# Patient Record
Sex: MALE | Race: WHITE
[De-identification: names, ages, dates, MRNs, and addresses within clinical notes are randomized per-mention and may not be internally consistent; named-entity substitution may affect disease eponyms.]

---

## 2018-05-10 NOTE — EDM.PDOC
ED HPI GENERAL MEDICAL PROBLEM





- General


Chief Complaint: Neuro Symptoms/Deficits


Stated Complaint: [facility]


Time Seen by Provider: 05/10/18 15:45


Source of Information: Reports: Patient


History Limitations: Reports: No Limitations





- History of Present Illness


INITIAL COMMENTS - FREE TEXT/NARRATIVE: 





82-year-old male presents to the ED [facility].  

states for the last 5 days since awakening he is appreciated numbness from his 

left lower back primarily around T10-11 all the way down to his left leg. He 

shoots penis is numb and left hemiscrotal contents are normal. There is no 

burning quality to the pain. He still able to void and defecate normally. 

Patient has known cervical disc disease with retrolisthesis of C4 on C5 and C5 

on C6. He has however no numbness or tingling in either upper extremity. Known 

severe degenerative disc disease throughout his lumbar spine with previous 

laminectomy he believes at L5-S1. He got sciatica relief in his left leg for 

about 6 months. Done in 1973. And MRIs reveal severe arthropathy in his lower 

facet joints from L2-L-4. He has chronic sciatica pain in both legs 

intermittently from spinal stenosis. He felt the numbness and tingling would 

dissipate on its own but has not. It has persisted over the last 5 days. He has 

no change in his gait or level of pain in his leg. There are no rashes in the 

distribution of his paresthesias.


Onset: Sudden


Onset Date: 05/05/18 (Awoke with numbness and tingling in his left lower back 

which involved his buttock cheek left lower leg penis and scrotum i.e. 

distribution of L1-L2. Symptoms have persisted since that time)


Onset Time: 07:00


Duration: Day(s):


Location: Reports: Other (Left lower back left buttock left lower extremity 

left amisha-scrotum and penis.)


Quality: Reports: Other (Numbness and tingling mostly numbness no burning 

sensation)


Severity: Moderate


Improves with: Reports: None


Worsens with: Reports: None.  Denies: Movement


Context: Denies: Activity, Exercise, Lifting, Sick Contact, Trauma


Associated Symptoms: Reports: Cough (Chronic cough. Is bringing up a little bit 

more phlegm than usual. No associated fever or chills. Spoke pipe for about 5-6 

times daily).  Denies: No Other Symptoms, Confusion, Chest Pain, cough w sputum

, Diaphoresis, Fever/Chills, Headaches, Loss of Appetite, Malaise, Nausea/

Vomiting, Seizure, Shortness of Breath, Syncope, Weakness


Treatments PTA: Reports: Other (see below)


  ** Lower Back


Pain Score (Numeric/FACES): 7





- Related Data


 Allergies











Allergy/AdvReac Type Severity Reaction Status Date / Time


 


No Known Allergies Allergy   Verified 05/10/18 15:29











Home Meds: 


 Home Meds





Amlodipine.  05/10/18 [History]


Terbinafine [LamISIL AT] 30 gm .XX DAILY #1 tube 05/10/18 [Rx]











Past Medical History


Cardiovascular History: Reports: Hypertension


Musculoskeletal History: Reports: Back Pain, Chronic, Other (See Below) (

Bilateral sciatica. Left is worse than the right. Lives with chronic pain for 

the last 45 years)





- Past Surgical History


Musculoskeletal Surgical History: Reports: Other (See Below)


Other Musculoskeletal Surgeries/Procedures:: laminectomy





Social & Family History





- Recreational Drug Use


Recreational Drug Use: No





- Living Situation & Occupation


Living situation: Reports: Single


Occupation: Retired





ED ROS GENERAL





- Review of Systems


Review Of Systems: See Below


Constitutional: Denies: Fever, Chills, Malaise, Weakness, Fatigue, Decreased 

Appetite, Weight Loss


HEENT: Reports: No Symptoms


Respiratory: Reports: Cough, Sputum (Chronic cough).  Denies: Hemoptysis ( is 

likely clear.)


Cardiovascular: Reports: No Symptoms


Endocrine: Reports: Fatigue


GI/Abdominal: Reports: No Symptoms


: Reports: Frequency, Other (Known BPH)


Musculoskeletal: Reports: Back Pain (Chronic severe back pain with sciatica 

both lower extremities worse worse on the left as compared to the right. 

Suspect secondary to degenerative just disease as well as significant 

arthropathy of the facet joints with spinal stenosis.)


Skin: Reports: Other (Numbness and tingling in his skin from along the left 

costal margin left amisha-back buttock left amisha-scrotum and penis and aware of 

increased numbness left lower extremity.)


Neurological: Reports: Numbness, Paresthesia, Tingling (As described above.), 

Difficulty Walking (Chronic difficulty walking due to weakness in both lower 

extremities.), Other (Diagnosed with perineural peroneal nerve palsy on the 

right side).  Denies: Pre-Existing Deficit, Seizure


Psychiatric: Reports: No Symptoms


Hematologic/Lymphatic: Reports: No Symptoms


Immunologic: Reports: No Symptoms





ED EXAM, NEURO





- Physical Exam


Exam: See Below


Exam Limited By: No Limitations


General Appearance: Alert, WD/WN, No Apparent Distress


Respiratory/Chest: No Respiratory Distress, Decreased Breath Sounds (Mildly 

decreased breath sounds lower 20% of lungs posteriorly compel with mild COPD.), 

Rhonchi (Scattered rhonchi anterior upper lung fields are clear with coughing. 

O2 sats are 96% on room air).  No: Normal Breath Sounds, Chest Non-Tender


Cardiovascular: Normal Peripheral Pulses, Regular Rate, Rhythm, No Edema, No 

Murmur


GI/Abdominal: Normal Bowel Sounds, Soft, Non-Tender, No Organomegaly, No Mass, 

Pelvis Stable


 (Male) Exam: Other (Reports left hemiscrotal area is normal as is his penis. 

Voiding normally)


Neurological: Abnormal Gait (Walks with a wide-based gait. Avoids going 

downstairs due to risk of falling.), Abnormal Sensation (Decreased sensation 

left lower back below the costal margin and follows along the 12th rib or lower 

costal margin. Aware of this and his left hemiabdomen left scrotum and shaft of 

his penis. Also aware of numbness tingling distribution of his left lower leg.)

, Abnormal Light Touch.  No: Ataxia


Back Exam: Normal Inspection, Decreased Range of Motion.  No: CVA Tenderness (R)

, Muscle Spasm, Paraspinal Tenderness


Extremities: Normal Inspection, Normal Range of Motion, Non-Tender, No Pedal 

Edema


Psychiatric: Normal Affect, Normal Mood


Skin Exam: Warm, Dry, Intact, Normal Color, Rash (Patient has a rash on the 

dorsal aspect of his left foot that has been present for over 6 weeks. Certain 

out as about a thumb sized area of erythema and is spread to become almost 

circular. On examination there is circular ridge with minimal central clearing 

suggestive of tinea pedis. The area is approximately 5 cm in diameter. Suspect 

tinea pedis infection)





Course





- Vital Signs


Last Recorded V/S: 


 Last Vital Signs











Temp  36.9 C   05/10/18 15:30


 


Pulse  82   05/10/18 15:30


 


Resp      


 


BP  170/110 H  05/10/18 15:30


 


Pulse Ox  96   05/10/18 15:30














- Orders/Labs/Meds


Orders: 


 Active Orders 24 hr











 Category Date Time Status


 


 Lumbar Spine wo Cont [CT] Stat Exams  05/10/18 15:55 Taken


 


 FOLIC ACID [CHEM] Stat Lab  05/10/18 16:10 Received


 


 VITAMIN B12 [CHEM] Stat Lab  05/10/18 16:10 Received











Labs: 


 Laboratory Tests











  05/10/18 05/10/18 05/10/18 Range/Units





  16:10 16:10 16:10 


 


WBC  9.19 H    (4.23-9.07)  K/mm3


 


RBC  5.07    (4.63-6.08)  M/mm3


 


Hgb  16.1    (13.7-17.5)  gm/L


 


Hct  48.0    (40.1-51.0)  %


 


MCV  94.7 H    (79.0-92.2)  fl


 


MCH  31.8    (25.7-32.2)  pg


 


MCHC  33.5    (32.2-35.5)  g/dl


 


RDW Std Deviation  49.3 H    (35.1-43.9)  fL


 


Plt Count  166    (163-337)  K/mm3


 


MPV  10.3    (9.4-12.3)  fl


 


Neutrophils % (Manual)  65 H    (40-60)  %


 


Band Neutrophils %  0    (0-10)  %


 


Lymphocytes % (Manual)  27    (20-40)  %


 


Atypical Lymphs %  0    %


 


Monocytes % (Manual)  8    (2-10)  %


 


Eosinophils % (Manual)  0 L    (0.8-7.0)  %


 


Basophils % (Manual)  0 L    (0.2-1.2)  


 


Platelet Estimate  Adequate    


 


RBC Morph Comment  Normal    


 


ESR   8   (0-15)  mm/hr


 


Sodium    142  (136-145)  mEq/L


 


Potassium    4.2  (3.5-5.1)  mEq/L


 


Chloride    107  ()  mEq/L


 


Carbon Dioxide    25  (21-32)  mEq/L


 


Anion Gap    14.2  (5-15)  


 


BUN    18  (7-18)  mg/dL


 


Creatinine    1.2  (0.7-1.3)  mg/dL


 


Est Cr Clr Drug Dosing    53.64  mL/min


 


Estimated GFR (MDRD)    58  (>60)  mL/min


 


BUN/Creatinine Ratio    15.0  (14-18)  


 


Glucose    106  ()  mg/dL


 


Calcium    9.2  (8.5-10.1)  mg/dL


 


Total Bilirubin    0.6  (0.2-1.0)  mg/dL


 


AST    5 L  (15-37)  U/L


 


ALT    24  (16-63)  U/L


 


Alkaline Phosphatase    61  ()  U/L


 


C-Reactive Protein    0.5  (<1.0)  mg/dL


 


Total Protein    7.2  (6.4-8.2)  g/dl


 


Albumin    3.8  (3.4-5.0)  g/dl


 


Globulin    3.4  gm/dL


 


Albumin/Globulin Ratio    1.1  (1-2)  














- Radiology Interpretation


Free Text/Narrative:: 


82-year-old male presents to the ED with a 5 day history of numbness and 

tingling in the distribution of his left amisha-back below the kidney and I 

believe an L1 nerve during nerve root distribution. The numbness extends from 

left amisha-back involving his buttock left hemiabdomen left scrotum shaft of the 

penis groin left lower extremity. Patient has chronic degenerative disc disease 

in the distribution of his lumbar spine. He also has significant degenerative 

disease in his cervical spine but has no upper extremity paresthesias numbness 

tingling or weakness. He has bilateral mild foot drops from peroneal nerve 

palsies and previous surgery on the left side. He does appear to have decreased 

sensation in the L1-L2 nerve root distribution. The cause of this was unclear. 

CT of his lumbar spine will be performed. Routine labs to be collected although 

they are unlikely to yield anything positive since he's asymptomatic and the 

rest of his body.








- Re-Assessments/Exams


Free Text/Narrative Re-Assessment/Exam: 





05/10/18 16:52 CT of the lumbar spine demonstrates moderate degenerative 

changes at multiple levels with particular anterior hypertrophic bridging noted 

at L2-L3 no acute fractures are evident. Vacuum disc present at L4-L5. 

Posterior disc bulges present at this level with no significant central canal 

stenosis. As appear unremarkable. 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 There is diffuse atherosclerotic changes in 

the vasculature.





05/10/18 17:12 Labs reveal a normal white count at 9.19 with 65% neutrophils no 

bands cells. Hemoglobin is 16.1 with hematocrit of 48.0. Mild hemoconcentration 

suspect. MCV is minimally elevated at 94.7. Platelet count is 166,000. 

Chemistry is completely normal. Total protein is normal at 7.2 with albumin 

fraction of 3.8. Therefore no metabolic abnormalities were identified to be 

causing numbness and tingling in the distribution as described in his history 

of present illness. Also the CT of the back itself does not show any central 

cord compression. Patient will have to follow up with neurology if symptoms 

persist. At this time no emergent condition exists. Patient will therefore be 

discharged to home. I will advise him to seek neurology consultation if his 

symptoms persist. Recommended Lamisil cream to be applied to the dorsal aspect 

of his left foot for suspect tenia pedis infection. I did do a folic acid and 

vitamin B-12 level on him but these results may not be available until 

tomorrow. I will call him if the levels returned abnormally low.











Departure





- Departure


Time of Disposition: 17:28


Disposition: Home, Self-Care 01


Condition: Fair


Clinical Impression: 


 Back pain associated with peripheral numbness, Tinea pedis, left








- Discharge Information


Prescriptions: 


Terbinafine [LamISIL AT] 30 gm .XX DAILY #1 tube


Instructions:  Athlete's Foot, Easy-to-Read


Referrals: 


Gill De DO [Primary Care Provider] - 


Forms:  ED Department Discharge


Additional Instructions: 


Evaluation the emergency room today [facility]. 

Development  of numbness with decreased sensation in the distribution of the 

ureter left lower back but talk left amisha-scrotum and penis as well as left 

lower extremity. These symptoms have been present for the last 5 days. There is 

no associated pain with this. He lived with chronic pain due to degenerative 

disc disease and severe arthritis in your lower back with sciatica in both 

lower extremities. Left is worse on the right. ET scan of the lower thoracic 

spine and lumbar spine was carried out today. It does not reveal any changes as 

compared to the MRI that you showed me. It demonstrates moderate degenerative 

changes at multiple levels there is extra bone which is actually bridging 

between lumbar 2 and lumbar 3 vertebral. I.e. they're starting to grow 

together. No fractures or abnormalities are appreciated. There is a vacuum disc 

present at L4-L5. Posterior disc bulge is present at that level with no 

significant central canal stenosis noted. Of note the CT did demonstrate a very 

small aneurysm of the aorta below the kidneys. It is 3.3 cm. Normal diameter of 

the aorta is around 2.5 cm. This needs to be followed up with a ultrasound of 

the aorta on a yearly basis. Third problem identified was rash chronically on 

her dorsal left foot which appears to be fungus infection or tinea infection. 

Lab work turned out to be completely normal. I have ordered a [facility] Annalee and 

vitamin B12 level but these results will not be available until tomorrow. To be 

normal. Therefore at this time I have no formal diagnosis to account for your 

numbness and tingling in this distribution. I would suggest neurological 

consultation with a neurologist. I would suggest [facility] this [facility] 

the [facility] which would mean going down to [place]. If the symptoms happened to 

go away over the next 6 weeks and you can always cancel the appointment. 

Suggest purchasing some Lamisil cream which is over-the-counter. Apply this to 

your rash on her foot once daily at bedtime for the next 3 weeks. May need a 

little bit longer if the lesion has not completely cleared.





- My Orders


Last 24 Hours: 


My Active Orders





05/10/18 15:55


Lumbar Spine wo Cont [CT] Stat 





05/10/18 16:10


FOLIC ACID [CHEM] Stat 


VITAMIN B12 [CHEM] Stat 














- Assessment/Plan


Last 24 Hours: 


My Active Orders





05/10/18 15:55


Lumbar Spine wo Cont [CT] Stat 





05/10/18 16:10


FOLIC ACID [CHEM] Stat 


VITAMIN B12 [CHEM] Stat

## 2018-05-11 NOTE — CT
CT lumbar spine

 

Technique: Multiple axial sections were obtained from above the 

T11-T12 disc inferiorly to below the L5-S1 disc.  Reconstructed 

sagittal and coronal images were reviewed.

 

Comparison: No prior lumbar spine imaging is available.

 

Findings:

T11-T12: Posterior disc is maintained.  No central canal stenosis or 

neural foraminal stenosis is seen.

 

T12-L1: Posterior disc is preserved.  Mild degenerative apophyseal 

change is seen.  No central canal stenosis or neural foraminal 

stenosis is seen.

 

L1-L2: Posterior disc is preserved.  No central canal stenosis is 

seen.  Fairly severe degenerative apophyseal change is seen.  Right 

renal cyst is seen at this level measuring approximately 3.8 cm.  No 

neural foraminal stenosis is seen.

 

L2-L3: Slight circumferential disc bulge is seen with posterior disc 

maintaining a concave margin.  Moderately severe degenerative 

apophyseal change noted.  No central canal stenosis is seen.  Neural 

foramina are patent where the nerve roots exit.

 

L3-L4: Circumferential disc bulge is seen.  Posterior disc maintains a

 concave margin.  Fairly severe degenerative apophyseal changes noted.

  Left-sided neural foraminal stenosis is seen.  Right neural foramen 

is patent.  Very minimal central canal stenosis is noted.

 

L4-L5: Circumferential disc bulge seen with vacuum phenomena.  

Moderate degenerative apophyseal change is seen.  Mild to moderate 

central canal stenosis is noted.  Bilateral neural foraminal stenosis 

is seen.

 

L5-S1: Disc space is obliterated.  Posterior laminectomy is noted.  No

 central canal stenosis is seen.  No discrete neural foraminal 

stenosis is seen.

 

No fracture is identified.  No abnormal subluxation is seen.  

Scattered anterior endplate osteophytes are seen.

 

Mid to distal abdominal aortic aneurysm is partially seen which 

appears saccular in configuration with AP dimension of about 2.8 cm on

 the sagittal views.

 

Impression:

1.  Diffuse degenerative change as noted above with several levels of 

central canal stenosis and neural foraminal stenosis.

2.  Mild saccular aneurysm within the mid to distal aorta with AP 

dimension of 2.8 cm.

3.  No acute bony abnormality is identified.

 

Diagnostic code #3

 

I agree with preliminary report from [facility], finalized at 05/10/18, 5:50

 PM Central Time

## 2019-02-19 ENCOUNTER — HOSPITAL ENCOUNTER (OUTPATIENT)
Dept: HOSPITAL 41 - JD.SDS | Age: 84
Discharge: HOME | End: 2019-02-19
Attending: OPHTHALMOLOGY
Payer: OTHER GOVERNMENT

## 2019-02-19 DIAGNOSIS — J44.9: ICD-10-CM

## 2019-02-19 DIAGNOSIS — H25.811: Primary | ICD-10-CM

## 2019-02-19 DIAGNOSIS — H16.223: ICD-10-CM

## 2019-02-19 DIAGNOSIS — H16.103: ICD-10-CM

## 2019-02-19 DIAGNOSIS — Z98.42: ICD-10-CM

## 2019-02-19 DIAGNOSIS — Z79.899: ICD-10-CM

## 2019-02-19 DIAGNOSIS — F17.210: ICD-10-CM

## 2019-02-19 DIAGNOSIS — I10: ICD-10-CM

## 2019-02-19 DIAGNOSIS — H02.831: ICD-10-CM

## 2019-02-19 DIAGNOSIS — Z96.1: ICD-10-CM

## 2019-02-19 DIAGNOSIS — H02.834: ICD-10-CM

## 2019-02-19 PROCEDURE — 66984 XCAPSL CTRC RMVL W/O ECP: CPT

## 2019-02-19 PROCEDURE — C1780 LENS, INTRAOCULAR (NEW TECH): HCPCS

## 2019-02-19 RX ADMIN — POLYMYXIN B SULFATE AND TRIMETHOPRIM SULFATE SCH DROP: 10000; 1 SOLUTION/ DROPS OPHTHALMIC at 09:33

## 2019-02-19 RX ADMIN — POLYMYXIN B SULFATE AND TRIMETHOPRIM SULFATE SCH DROP: 10000; 1 SOLUTION/ DROPS OPHTHALMIC at 08:59

## 2019-02-19 RX ADMIN — POLYMYXIN B SULFATE AND TRIMETHOPRIM SULFATE SCH ML: 10000; 1 SOLUTION/ DROPS OPHTHALMIC at 10:27

## 2019-02-19 RX ADMIN — TETRACAINE HYDROCHLORIDE SCH DROP: 5 SOLUTION OPHTHALMIC at 09:58

## 2019-02-19 RX ADMIN — TETRACAINE HYDROCHLORIDE SCH ML: 5 SOLUTION OPHTHALMIC at 10:16

## 2019-02-19 NOTE — [id number]
Post Anesthesia Note





- EVALUATION WITHIN 48HRS OF ANESTHETIC


Vital Signs in Normal Range: Yes


Patient Participated in Evaluation: Yes


Respiratory Function Stable: Yes


Airway Patent: Yes


Cardiovascular Function Stable: Yes


Hydration Status Stable: Yes


Pain Control Satisfactory: Yes


Nausea and Vomiting Control Satisfactory: Yes


Mental Status Recovered: Yes


Pulse Rate: 65


SaO2: 96


Resp Rate: 15


Temperature: 36.2 C


Blood Pressure: 139/84

## 2019-02-19 NOTE — PCM.PREANE
Preanesthetic Assessment





- Procedure


Proposed Procedure: 





cataract right eye





- Anesthesia/Transfusion/Family Hx


Anesthesia History: Prior Anesthesia Without Reaction


Family History of Anesthesia Reaction: No


Transfusion History: No Prior Transfusion(s)





- Review of Systems


General: No Symptoms


Pulmonary: No Symptoms


Cardiovascular: No Symptoms


Gastrointestinal: No Symptoms


Neurological: No Symptoms


Other: Reports: None





- Physical Assessment


NPO Status Date: 02/18/19


NPO Status Time: 21:00


Pulse: 92


O2 Sat by Pulse Oximetry: 97


Respiratory Rate: 16


Blood Pressure: 162/100


Temperature: 97.2 F


Height: 6 ft 1 in


Weight: 88.451 kg


ASA Class: 3


Mental Status: Alert & Oriented x3


Airway Class: Mallampati = 1


Dentition: Reports: Broken Tooth/Teeth, Missing Tooth/Teeth


Thyro-Mental Finger Breadths: 3


Mouth Opening Finger Breadths: 3


ROM/Head Extension: Full


Cardiovascular: Regular Rate, Regular Rhythm





- Allergies


Allergies/Adverse Reactions: 


 Allergies











Allergy/AdvReac Type Severity Reaction Status Date / Time


 


No Known Allergies Allergy   Verified 02/18/19 14:37














- Blood


Blood Available: No





- Acknowledgements


Anesthesia Type Planned: MAC


Pt an Appropriate Candidate for the Planned Anesthesia: Yes


Alternatives and Risks of Anesthesia Discussed w Pt/Guardian: Yes


Pt/Guardian Understands and Agrees with Anesthesia Plan: Yes





PreAnesthesia Questionnaire


Cardiovascular History: Reports: Hypertension


Respiratory History: Reports: COPD


Gastrointestinal History: Reports: None


Musculoskeletal History: Reports: Back Pain, Chronic, Other (See Below) (

Bilateral sciatica. Left is worse than the right. Lives with chronic pain for 

the last 45 years)





- Past Surgical History


Musculoskeletal Surgical History: Reports: Other (See Below) (ankle surgery)


Other Musculoskeletal Surgeries/Procedures:: laminectomy





- SUBSTANCE USE


Smoking Status *Q: Current Every Day Smoker


Tobacco Use Within Last Twelve Months: Cigarettes


Second Hand Smoke Exposure: Yes


Days Per Week of Alcohol Use: 1


Number of Drinks Per Day: 1


Total Drinks Per Week: 1


Recreational Drug Use History: No





- HOME MEDS


Home Medications: 


 Home Meds





Ascorbic Acid [Vitamin C] 1,000 mg PO DAILY 11/28/18 [History]


Calcium Carbonate [Calcium] 500 mg PO DAILY 11/28/18 [History]


Cholecalciferol (Vitamin D3) [Vitamin D3] 1,000 unit PO DAILY 11/28/18 [History]


Vitamin B Complex 1 cap PO DAILY 11/28/18 [History]


amLODIPine Besylate [Amlodipine Besylate] 5 mg PO DAILY 11/28/18 [History]











- CURRENT (IN HOUSE) MEDS


Current Meds: 





 Current Medications





Brimonidine Tartrate (Alphagan 0.2% Ophth Soln)  0 ml EYERT ASDIRECTED CHE


   Stop: 02/19/19 18:00


Cefuroxime Sodium (Zinacef)  0 mg EYERT ASDIRECTED CHE


   Stop: 02/19/19 18:00


Lidocaine HCl (Xylocaine-Mpf 1%)  0 ml INJECT ASDIRECTED CHE


   Stop: 02/19/19 18:00


Phenylephrine HCl (Roni-Synephrine 2.5% Ophth Soln)  0 ml EYERT ASDIRECTED CHE


   Stop: 02/19/19 18:00


Pilocarpine HCl (Pilocar 4% Ophth Soln)  0 ml EYERT ASDIRECTED CHE


   Stop: 02/19/19 18:00


Polymyxin/Trimethoprim Sulfate (Polytrim Ophth Soln)  0 ml EYERT ASDIRECTED CHE


   Stop: 02/19/19 18:00


Tetracaine HCl (Tetracaine 0.5% Steri-Unit Sol)  0 ml EYERT ASDIRECTED CHE


   Stop: 02/19/19 18:00


Tropicamide (Mydriacyl 1% Ophth Soln)  0 ml EYERT ASDIRECTED CHE


   Stop: 02/19/19 18:00

## 2020-01-07 NOTE — EDM.PDOC
ED HPI GENERAL MEDICAL PROBLEM





- General


Chief Complaint: Genitourinary Problem


Stated Complaint: [facility]


Time Seen by Provider: 01/07/20 16:25





- History of Present Illness


INITIAL COMMENTS - FREE TEXT/NARRATIVE: 





84-year-old male presents to the emergency room after [facility] with [facility] infection.





Patient has not felt right for the last 24 hours.  This morning from about 1 AM 

to 6 AM he had shakes and chills.  Apparently he has a history of recurrent 

urinary tract infections and has had quite elevated PSA according to [facility] 

[facility].  Patient is unaware of any other symptoms he is not having 

any nausea no vomiting he is not aware of any fevers but admits to the shakes 

and chills this morning.  He has had no breathing difficulties or shortness of 

breath no cough.  He is not having any burning or frequency with urination.  

The patient is a retired chiropractor.  This morning he tried to urinate into 

the kitchen sink to see what was going on in his urine he did not see anything 

abnormal and there were not any dishes in the sink unfortunately while he was 

doing this he lost his balance and fell and has a skin tear on his hand








- Related Data


 Allergies











Allergy/AdvReac Type Severity Reaction Status Date / Time


 


No Known Allergies Allergy   Verified 02/18/19 14:37











Home Meds: 


 Home Meds





Ascorbic Acid [Vitamin C] 1,000 mg PO DAILY 11/28/18 [History]


Calcium Carbonate [Calcium] 500 mg PO DAILY 11/28/18 [History]


Cholecalciferol (Vitamin D3) [Vitamin D3] 1,000 unit PO DAILY 11/28/18 [History]


Vitamin B Complex 1 cap PO DAILY 11/28/18 [History]


amLODIPine Besylate [Amlodipine Besylate] 5 mg PO DAILY 11/28/18 [History]











Past Medical History


Cardiovascular History: Reports: Hypertension, Other (See Below)


Other Cardiovascular History: abdominal aortic aneurysm


Respiratory History: Reports: COPD


Gastrointestinal History: Reports: None


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


Musculoskeletal History: Reports: Back Pain, Chronic, Other (See Below)





- Past Surgical History


Musculoskeletal Surgical History: Reports: Other (See Below)


Other Musculoskeletal Surgeries/Procedures:: laminectomy





Social & Family History





- Tobacco Use


Smoking Status *Q: Current Every Day Smoker


Years of Tobacco use: 66


Packs/Tins Daily: 0.4





- Caffeine Use


Caffeine Use: Reports: Coffee





- Recreational Drug Use


Recreational Drug Use: No





- Living Situation & Occupation


Living situation: Reports: Single


Occupation: Retired





ED ROS GENERAL





- Review of Systems


Review Of Systems: See Below


Constitutional: Reports: Chills, Night Sweats.  Denies: Fever


HEENT: Reports: No Symptoms


Respiratory: Reports: No Symptoms


Cardiovascular: Reports: No Symptoms


Endocrine: Reports: No Symptoms


GI/Abdominal: Reports: No Symptoms


: Reports: No Symptoms


Musculoskeletal: Reports: No Symptoms


Skin: Reports: No Symptoms


Neurological: Reports: No Symptoms





ED EXAM, GENERAL





- Physical Exam


Exam: See Below


Exam Limited By: No Limitations


General Appearance: Alert, No Apparent Distress


Eye Exam: Bilateral Eye: Normal Inspection


Ears: Normal External Exam, Normal Canal, Hearing Grossly Normal, Normal TMs, 

Other (Cerumen noted in both canals)


Nose: Normal Inspection, Normal Mucosa, No Blood


Throat/Mouth: Normal Inspection, Normal Lips, Normal Gums, Normal Oropharynx, 

Normal Voice, No Airway Compromise


Head: Atraumatic, Normocephalic


Neck: Normal Inspection, Supple, Non-Tender, Full Range of Motion.  No: 

Lymphadenopathy (L), Lymphadenopathy (R)


Respiratory/Chest: No Respiratory Distress, Lungs Clear, Normal Breath Sounds


Cardiovascular: Regular Rate, Rhythm, No Edema, No Murmur


GI/Abdominal: Normal Bowel Sounds, Soft, Other (Suprapubic discomfort more to 

the left side than on the right the patient has had this for quite a long time 

however.)


Back Exam: Normal Inspection, CVA Tenderness (R).  No: CVA Tenderness (L)


Extremities: Normal Inspection, No Pedal Edema





Course





- Vital Signs


Last Recorded V/S: 


 Last Vital Signs











Temp  36.1 C   01/07/20 16:17


 


Pulse  80   01/07/20 16:17


 


Resp  20   01/07/20 16:17


 


BP  103/68   01/07/20 16:17


 


Pulse Ox  94 L  01/07/20 16:17














- Orders/Labs/Meds


Orders: 


 Active Orders 24 hr











 Category Date Time Status


 


 CULTURE BLOOD [BC] Stat Lab  01/07/20 17:18 Received


 


 CULTURE BLOOD [BC] Stat Lab  01/07/20 17:26 Received


 


 CULTURE URINE [RM] Stat Lab  01/07/20 20:26 Received


 


 Sodium Chloride 0.9% [Normal Saline] 1,000 ml Med  01/07/20 21:16 Active





 IV ONETIME   


 


 cefTRIAXone [Rocephin] 2 gm Med  01/07/20 21:12 Active





 Sodium Chloride 0.9% [Normal Saline] 100 ml   





 IV ONETIME   


 


 Blood Culture x2 Reflex Set [OM.PC] Stat Oth  01/07/20 16:44 Ordered








 Medication Orders





Ceftriaxone Sodium 2 gm/ (Sodium Chloride)  100 mls @ 200 mls/hr IV ONETIME ONE


   Stop: 01/07/20 21:41


Sodium Chloride (Normal Saline)  1,000 mls @ 999 mls/hr IV ONETIME ONE


   Stop: 01/07/20 22:16








Labs: 


 Laboratory Tests











  01/07/20 01/07/20 01/07/20 Range/Units





  17:18 17:18 17:18 


 


WBC  24.90 H    (4.23-9.07)  K/mm3


 


RBC  4.02 L    (4.63-6.08)  M/mm3


 


Hgb  12.0 L D    (13.7-17.5)  gm/dl


 


Hct  36.5 L    (40.1-51.0)  %


 


MCV  90.8  D    (79.0-92.2)  fl


 


MCH  29.9    (25.7-32.2)  pg


 


MCHC  32.9    (32.2-35.5)  g/dl


 


RDW Std Deviation  53.1 H    (35.1-43.9)  fL


 


Plt Count  219    (163-337)  K/mm3


 


MPV  9.9    (9.4-12.3)  fl


 


Neutrophils % (Manual)  87 H    (40-60)  %


 


Band Neutrophils %  5    (0-10)  %


 


Lymphocytes % (Manual)  5 L    (20-40)  %


 


Atypical Lymphs %  0    %


 


Monocytes % (Manual)  3    (2-10)  %


 


Eosinophils % (Manual)  0 L    (0.8-7.0)  %


 


Basophils % (Manual)  0 L    (0.2-1.2)  


 


Platelet Estimate  Adequate    


 


Anisocytosis  1+ slight    


 


Macrocytosis  1+ slight    


 


Ovalocytes  1+ slight    


 


RBC Morph Comment  Not Reportable    


 


Sodium   139   (136-145)  mEq/L


 


Potassium   4.1   (3.5-5.1)  mEq/L


 


Chloride   104   ()  mEq/L


 


Carbon Dioxide   21   (21-32)  mEq/L


 


Anion Gap   18.1 H   (5-15)  


 


BUN   20 H   (7-18)  mg/dL


 


Creatinine   1.4 H   (0.7-1.3)  mg/dL


 


Est Cr Clr Drug Dosing   44.39   mL/min


 


Estimated GFR (MDRD)   48   (>60)  mL/min


 


BUN/Creatinine Ratio   14.3   (14-18)  


 


Glucose   96   ()  mg/dL


 


Lactic Acid    2.5 H*  (0.4-2.0)  mmol/L


 


Calcium   8.8   (8.5-10.1)  mg/dL


 


Total Bilirubin   0.6   (0.2-1.0)  mg/dL


 


AST   14 L   (15-37)  U/L


 


ALT   17   (16-63)  U/L


 


Alkaline Phosphatase   61   ()  U/L


 


Total Protein   6.9   (6.4-8.2)  g/dl


 


Albumin   2.9 L   (3.4-5.0)  g/dl


 


Globulin   4.0   gm/dL


 


Albumin/Globulin Ratio   0.7 L   (1-2)  


 


Urine Color     (Yellow)  


 


Urine Appearance     (Clear)  


 


Urine pH     (5.0-8.0)  


 


Ur Specific Gravity     (1.005-1.030)  


 


Urine Protein     (Negative)  


 


Urine Glucose (UA)     (Negative)  


 


Urine Ketones     (Negative)  


 


Urine Occult Blood     (Negative)  


 


Urine Nitrite     (Negative)  


 


Urine Bilirubin     (Negative)  


 


Urine Urobilinogen     (0.2-1.0)  


 


Ur Leukocyte Esterase     (Negative)  


 


Urine RBC     (0-5)  /hpf


 


Urine WBC     (0-5)  /hpf


 


Ur Squamous Epith Cells     (0-5)  /hpf


 


Urine Bacteria     (FEW)  /hpf


 


Urine Mucus     (FEW)  /hpf














  01/07/20 Range/Units





  20:10 


 


WBC   (4.23-9.07)  K/mm3


 


RBC   (4.63-6.08)  M/mm3


 


Hgb   (13.7-17.5)  gm/dl


 


Hct   (40.1-51.0)  %


 


MCV   (79.0-92.2)  fl


 


MCH   (25.7-32.2)  pg


 


MCHC   (32.2-35.5)  g/dl


 


RDW Std Deviation   (35.1-43.9)  fL


 


Plt Count   (163-337)  K/mm3


 


MPV   (9.4-12.3)  fl


 


Neutrophils % (Manual)   (40-60)  %


 


Band Neutrophils %   (0-10)  %


 


Lymphocytes % (Manual)   (20-40)  %


 


Atypical Lymphs %   %


 


Monocytes % (Manual)   (2-10)  %


 


Eosinophils % (Manual)   (0.8-7.0)  %


 


Basophils % (Manual)   (0.2-1.2)  


 


Platelet Estimate   


 


Anisocytosis   


 


Macrocytosis   


 


Ovalocytes   


 


RBC Morph Comment   


 


Sodium   (136-145)  mEq/L


 


Potassium   (3.5-5.1)  mEq/L


 


Chloride   ()  mEq/L


 


Carbon Dioxide   (21-32)  mEq/L


 


Anion Gap   (5-15)  


 


BUN   (7-18)  mg/dL


 


Creatinine   (0.7-1.3)  mg/dL


 


Est Cr Clr Drug Dosing   mL/min


 


Estimated GFR (MDRD)   (>60)  mL/min


 


BUN/Creatinine Ratio   (14-18)  


 


Glucose   ()  mg/dL


 


Lactic Acid   (0.4-2.0)  mmol/L


 


Calcium   (8.5-10.1)  mg/dL


 


Total Bilirubin   (0.2-1.0)  mg/dL


 


AST   (15-37)  U/L


 


ALT   (16-63)  U/L


 


Alkaline Phosphatase   ()  U/L


 


Total Protein   (6.4-8.2)  g/dl


 


Albumin   (3.4-5.0)  g/dl


 


Globulin   gm/dL


 


Albumin/Globulin Ratio   (1-2)  


 


Urine Color  Dark yellow  (Yellow)  


 


Urine Appearance  Cloudy H  (Clear)  


 


Urine pH  5.5  (5.0-8.0)  


 


Ur Specific Gravity  > or = 1.030  (1.005-1.030)  


 


Urine Protein  1+ H  (Negative)  


 


Urine Glucose (UA)  Negative  (Negative)  


 


Urine Ketones  Trace H  (Negative)  


 


Urine Occult Blood  1+ H  (Negative)  


 


Urine Nitrite  Positive H  (Negative)  


 


Urine Bilirubin  1+ H  (Negative)  


 


Urine Urobilinogen  1.0  (0.2-1.0)  


 


Ur Leukocyte Esterase  1+ H  (Negative)  


 


Urine RBC  5-10 H  (0-5)  /hpf


 


Urine WBC  40-50 H  (0-5)  /hpf


 


Ur Squamous Epith Cells  0-5  (0-5)  /hpf


 


Urine Bacteria  Many H  (FEW)  /hpf


 


Urine Mucus  Few  (FEW)  /hpf











Meds: 


Medications











Generic Name Dose Route Start Last Admin





  Trade Name Freq  PRN Reason Stop Dose Admin


 


Ceftriaxone Sodium 2 gm/  100 mls @ 200 mls/hr  01/07/20 21:12  





  Sodium Chloride  IV  01/07/20 21:41  





  ONETIME ONE   





     





     





     





     


 


Sodium Chloride  1,000 mls @ 999 mls/hr  01/07/20 21:16  





  Normal Saline  IV  01/07/20 22:16  





  ONETIME ONE   





     





     





     





     














Discontinued Medications














Generic Name Dose Route Start Last Admin





  Trade Name Brayan  PRN Reason Stop Dose Admin


 


Acetaminophen  975 mg  01/07/20 20:13  01/07/20 20:19





  Tylenol  PO  01/07/20 20:14  975 mg





  ONETIME ONE   Administration





     





     





     





     


 


Lactated Ringer's  500 mls @ 999 mls/hr  01/07/20 16:57  01/07/20 17:22





  Ringers, Lactated  IV  01/07/20 17:27  999 mls/hr





  .BOLUS ONE   Administration





     





     





     





     


 


Lactated Ringer's  1,000 mls @ 150 mls/hr  01/07/20 17:00  01/07/20 18:37





  Ringers, Lactated  IV   150 mls/hr





  ASDIRECTED CHE   Administration





     





     





     





     


 


Lactated Ringer's  500 mls @ 999 mls/hr  01/07/20 18:53  01/07/20 18:00





  Ringers, Lactated  IV  01/07/20 19:23  999 mls/hr





  .BOLUS ONE   Administration





     





     





     





     














- Re-Assessments/Exams


Free Text/Narrative Re-Assessment/Exam: 





01/07/20 18:55


Patient's lactic acid is 2.5 his white count is almost 25,000 with 87% segs and 

5% bands still waiting to obtain a urine specimen


01/07/20 21:19


Analysis is strongly suggestive of an infectious process urine culture set up 

patient will receive 2 g of IV Rocephin.  Patient will be admitted.





Departure





- Departure


Time of Disposition: 21:19


Disposition: Admitted As Inpatient 66


Clinical Impression: 


 Pyelonephritis








- Discharge Information


Referrals: 


Melanie Cao MD [Primary Care Provider] - 


Forms:  ED Department Discharge





Sepsis Event Note





- Evaluation


Sepsis Screening Result: No Definite Risk





- Focused Exam


Vital Signs: 


 Vital Signs











  Temp Pulse Resp BP Pulse Ox


 


 01/07/20 16:17  36.1 C  80  20  103/68  94 L











Date Exam was Performed: 01/07/20


Time Exam was Performed: 21:20





- My Orders


Last 24 Hours: 


My Active Orders





01/07/20 16:44


Blood Culture x2 Reflex Set [OM.PC] Stat 





01/07/20 17:18


CULTURE BLOOD [BC] Stat 





01/07/20 17:26


CULTURE BLOOD [BC] Stat 





01/07/20 20:26


CULTURE URINE [RM] Stat 





01/07/20 21:12


cefTRIAXone [Rocephin] 2 gm   Sodium Chloride 0.9% [Normal Saline] 100 ml IV 

ONETIME 





01/07/20 21:16


Sodium Chloride 0.9% [Normal Saline] 1,000 ml IV ONETIME 














- Assessment/Plan


Last 24 Hours: 


My Active Orders





01/07/20 16:44


Blood Culture x2 Reflex Set [OM.PC] Stat 





01/07/20 17:18


CULTURE BLOOD [BC] Stat 





01/07/20 17:26


CULTURE BLOOD [BC] Stat 





01/07/20 20:26


CULTURE URINE [RM] Stat 





01/07/20 21:12


cefTRIAXone [Rocephin] 2 gm   Sodium Chloride 0.9% [Normal Saline] 100 ml IV 

ONETIME 





01/07/20 21:16


Sodium Chloride 0.9% [Normal Saline] 1,000 ml IV ONETIME

## 2020-01-07 NOTE — CR
Chest: Portable view of the chest was obtained.

 

Comparison: No prior chest imaging.

 

Heart size is normal.  Tortuous thoracic aorta is seen.  Lungs are 

clear with no acute parenchymal change.  Bony structures are grossly 

intact.

 

Impression:

1.  Nothing acute is seen on portable chest x-ray.

 

Diagnostic code #1

 

This report was dictated in [place] Standard Time

## 2020-01-08 NOTE — PCM.HP.2
H&P History of Present Illness





- General


Date of Service: 01/08/20


Admit Problem/Dx: 


 Admission Diagnosis/Problem





Admission Diagnosis/Problem      Pyelonephritis








Source of Information: Patient, Provider, RN, RN Notes Reviewed


History Limitations: Reports: No Limitations





- History of Present Illness


Initial Comments - Free Text/Narative: 


Leighton Morrow is a 83 yo male who presents to our ED on 1/7/2020 [facility].  He reports he is felt 

terrible for the past 24 hours and from 1 AM to 6 AM he reported shakes and 

chills.  [facility] he has had multiple recurrent urinary tract 

infections and elevated PSA.  Denies any nausea, vomiting, fevers, shortness of 

breath, cough, burning with urination or increased urinary frequency.  Per the 

ED note the patient was attempting to urinate in the kitchen sink this morning 

to see if his urine was abnormal.  While doing this he lost his balance and 

fell resulting in a skin tear on his head.





In the ED temp is 36.1 Celsius.  Pulse 80.  Respirations 20.  Blood pressure 103

/68.  Pulse ox 94%.  Labs are obtained showing an elevated WBC of 24.90.  

Hemoglobin is low at 12.0.  Hematocrit 36.5.  He is normocytic.  Blood sugar 219

,000.  Neutrophils are elevated at 87%.  There is 5% band neutrophils.  Sodium 

is 139.  Potassium 4.1.  Carbon dioxide 21.  Anion gap is high at 18.1.  BUN is 

20.  Creatinine 1.4.  EGFR is 48.  Glucose is 96.  Lactic acid is high at 2.5.  

Calcium is 8.8.  Bilirubin 0.6.  AST is normal at 14, ALT 17, alkaline 

phosphatase 61.  Protein is 6.9.  Albumin is low at 2.9.  UA is obtained 

showing dark yellow cloudy urine that is concentrated with 1+ protein, trace 

ketones, 1+ blood, positive nitrite, 1+ bilirubin, 1+ leukocyte esterase, 5-10 

RBCs, 40-50 WBCs, and many bacteria.  He is given a 1 L bolus of saline and 

started on Rocephin.  Blood cultures were obtained and a urine culture is 

ordered.  Chest x-rays obtained showing nothing acute.





He carries a history of hypertension, abdominal aortic aneurysm, COPD, prostate 

disorder, chronic back pain status post laminectomy.  He is a current daily 

smoker. He is a full code. [facility] 








- Related Data


Allergies/Adverse Reactions: 


 Allergies











Allergy/AdvReac Type Severity Reaction Status Date / Time


 


No Known Allergies Allergy   Verified 02/18/19 14:37











Home Medications: 


 Home Meds





Ascorbic Acid [Vitamin C] 1,000 mg PO DAILY 11/28/18 [History]


Calcium Carbonate [Calcium] 500 mg PO DAILY 11/28/18 [History]


Cholecalciferol (Vitamin D3) [Vitamin D3] 1,000 unit PO DAILY 11/28/18 [History]


Vitamin B Complex 1 cap PO DAILY 11/28/18 [History]


amLODIPine Besylate [Amlodipine Besylate] 5 mg PO DAILY 11/28/18 [History]











Past Medical History


Cardiovascular History: Reports: Hypertension, Other (See Below)


Other Cardiovascular History: abdominal aortic aneurysm


Respiratory History: Reports: COPD


Gastrointestinal History: Reports: Chronic Constipation, Other (See Below)


Other Gastrointestinal History: For past 1.5 years


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


Musculoskeletal History: Reports: Back Pain, Chronic





- Infectious Disease History


Infectious Disease History: Reports: Chicken Pox, Measles, Mumps





- Past Surgical History


HEENT Surgical History: Reports: Cataract Surgery, Other (See Below)


Other HEENT Surgeries/Procedures: Bilaterally


Cardiovascular Surgical History: Reports: None


Respiratory Surgical History: Reports: None


GI Surgical History: Reports: None


Male  Surgical History: Reports: None


Musculoskeletal Surgical History: Reports: Other (See Below)


Other Musculoskeletal Surgeries/Procedures:: laminectomy





Social & Family History





- Family History


Family Medical History: Noncontributory





- Tobacco Use


Smoking Status *Q: Current Every Day Smoker


Years of Tobacco use: 66


Packs/Tins Daily: 0.5


Used Tobacco, but Quit: No


Second Hand Smoke Exposure: Yes





- Caffeine Use


Caffeine Use: Reports: Coffee





- Alcohol Use


Date of Last Drink: 11/29/19





- Recreational Drug Use


Recreational Drug Use: No





- Living Situation & Occupation


Living situation: Reports: Single


Occupation: Retired





H&P Review of Systems





- Review of Systems:


Review Of Systems: See Below


General: Reports: Malaise (improving ), Weakness (improving ).  Denies: Fever, 

Chills, Fatigue, Night Sweats


HEENT: Reports: No Symptoms.  Denies: Headaches, Sore Throat


Pulmonary: Reports: No Symptoms.  Denies: Shortness of Breath, Wheezing, Cough, 

Sputum


Cardiovascular: Reports: No Symptoms.  Denies: Chest Pain, Palpitations, 

Dyspnea on Exertion, Edema


Gastrointestinal: Reports: No Symptoms.  Denies: Abdominal Pain, Constipation, 

Diarrhea, Nausea, Vomiting


Genitourinary: Reports: No Symptoms.  Denies: Frequency, Pain


Musculoskeletal: Reports: No Symptoms


Skin: Reports: No Symptoms


Psychiatric: Reports: No Symptoms.  Denies: Confusion


Neurological: Reports: No Symptoms.  Denies: Pre-Existing Deficit, Difficulty 

Walking, Gait Disturbance


Hematologic/Lymphatic: Reports: No Symptoms


Immunologic: Reports: No Symptoms





Exam





- Exam


Exam: See Below





- Vital Signs


Vital Signs: 


 Last Vital Signs











Temp  98.4 F   01/08/20 04:10


 


Pulse  67   01/08/20 04:10


 


Resp  18   01/08/20 04:10


 


BP  114/75   01/08/20 04:10


 


Pulse Ox  94 L  01/08/20 04:10











Weight: 178 lb 8 oz





- Exam


Quality Assessment: DVT Prophylaxis.  No: Supplemental Oxygen, Urinary Catheter


General: Alert, Oriented, Cooperative.  No: Mild Distress


HEENT: Conjunctiva Clear, EACs Clear, EOMI, Hearing Intact, Mucosa Moist & Pink

, Normal Nasal Septum, Posterior Pharynx Clear, PERRLA


Neck: Supple, Trachea Midline


Lungs: Clear to Auscultation, Normal Respiratory Effort


Cardiovascular: Regular Rate, Regular Rhythm


GI/Abdominal Exam: Normal Bowel Sounds, Soft, Non-Tender, No Distention, No 

Abnormal Bruit


 (Male) Exam: Deferred


Rectal (Males) Exam: Deferred


Back Exam: Normal Inspection, Full Range of Motion, CVA Tenderness (R).  No: 

CVA Tenderness (L)


Extremities: Normal Inspection, Normal Range of Motion, Non-Tender, No Pedal 

Edema, Normal Capillary Refill


Peripheral Pulses: 2+: Radial (L), Radial (R), Dorsalis Pedis (L), Dorsalis 

Pedis (R)


Skin: Warm, Dry, Intact


Neurological: Cranial Nerves Intact (Grossly )


Neuro Extensive - Mental Status: Alert, Oriented x3, Normal Mood/Affect, Normal 

Cognition





- Patient Data


Lab Results Last 24 hrs: 


 Laboratory Results - last 24 hr











  01/07/20 01/07/20 01/07/20 Range/Units





  17:18 17:18 17:18 


 


WBC  24.90 H    (4.23-9.07)  K/mm3


 


RBC  4.02 L    (4.63-6.08)  M/mm3


 


Hgb  12.0 L D    (13.7-17.5)  gm/dl


 


Hct  36.5 L    (40.1-51.0)  %


 


MCV  90.8  D    (79.0-92.2)  fl


 


MCH  29.9    (25.7-32.2)  pg


 


MCHC  32.9    (32.2-35.5)  g/dl


 


RDW Std Deviation  53.1 H    (35.1-43.9)  fL


 


Plt Count  219    (163-337)  K/mm3


 


MPV  9.9    (9.4-12.3)  fl


 


Neutrophils % (Manual)  87 H    (40-60)  %


 


Band Neutrophils %  5    (0-10)  %


 


Lymphocytes % (Manual)  5 L    (20-40)  %


 


Atypical Lymphs %  0    %


 


Monocytes % (Manual)  3    (2-10)  %


 


Eosinophils % (Manual)  0 L    (0.8-7.0)  %


 


Basophils % (Manual)  0 L    (0.2-1.2)  


 


Platelet Estimate  Adequate    


 


Anisocytosis  1+ slight    


 


Macrocytosis  1+ slight    


 


Ovalocytes  1+ slight    


 


RBC Morph Comment  Not Reportable    


 


Sodium   139   (136-145)  mEq/L


 


Potassium   4.1   (3.5-5.1)  mEq/L


 


Chloride   104   ()  mEq/L


 


Carbon Dioxide   21   (21-32)  mEq/L


 


Anion Gap   18.1 H   (5-15)  


 


BUN   20 H   (7-18)  mg/dL


 


Creatinine   1.4 H   (0.7-1.3)  mg/dL


 


Est Cr Clr Drug Dosing   44.39   mL/min


 


Estimated GFR (MDRD)   48   (>60)  mL/min


 


BUN/Creatinine Ratio   14.3   (14-18)  


 


Glucose   96   ()  mg/dL


 


Lactic Acid    2.5 H*  (0.4-2.0)  mmol/L


 


Calcium   8.8   (8.5-10.1)  mg/dL


 


Total Bilirubin   0.6   (0.2-1.0)  mg/dL


 


AST   14 L   (15-37)  U/L


 


ALT   17   (16-63)  U/L


 


Alkaline Phosphatase   61   ()  U/L


 


Total Protein   6.9   (6.4-8.2)  g/dl


 


Albumin   2.9 L   (3.4-5.0)  g/dl


 


Globulin   4.0   gm/dL


 


Albumin/Globulin Ratio   0.7 L   (1-2)  


 


Urine Color     (Yellow)  


 


Urine Appearance     (Clear)  


 


Urine pH     (5.0-8.0)  


 


Ur Specific Gravity     (1.005-1.030)  


 


Urine Protein     (Negative)  


 


Urine Glucose (UA)     (Negative)  


 


Urine Ketones     (Negative)  


 


Urine Occult Blood     (Negative)  


 


Urine Nitrite     (Negative)  


 


Urine Bilirubin     (Negative)  


 


Urine Urobilinogen     (0.2-1.0)  


 


Ur Leukocyte Esterase     (Negative)  


 


Urine RBC     (0-5)  /hpf


 


Urine WBC     (0-5)  /hpf


 


Ur Squamous Epith Cells     (0-5)  /hpf


 


Urine Bacteria     (FEW)  /hpf


 


Urine Mucus     (FEW)  /hpf














  01/07/20 01/07/20 Range/Units





  20:10 23:00 


 


WBC    (4.23-9.07)  K/mm3


 


RBC    (4.63-6.08)  M/mm3


 


Hgb    (13.7-17.5)  gm/dl


 


Hct    (40.1-51.0)  %


 


MCV    (79.0-92.2)  fl


 


MCH    (25.7-32.2)  pg


 


MCHC    (32.2-35.5)  g/dl


 


RDW Std Deviation    (35.1-43.9)  fL


 


Plt Count    (163-337)  K/mm3


 


MPV    (9.4-12.3)  fl


 


Neutrophils % (Manual)    (40-60)  %


 


Band Neutrophils %    (0-10)  %


 


Lymphocytes % (Manual)    (20-40)  %


 


Atypical Lymphs %    %


 


Monocytes % (Manual)    (2-10)  %


 


Eosinophils % (Manual)    (0.8-7.0)  %


 


Basophils % (Manual)    (0.2-1.2)  


 


Platelet Estimate    


 


Anisocytosis    


 


Macrocytosis    


 


Ovalocytes    


 


RBC Morph Comment    


 


Sodium    (136-145)  mEq/L


 


Potassium    (3.5-5.1)  mEq/L


 


Chloride    ()  mEq/L


 


Carbon Dioxide    (21-32)  mEq/L


 


Anion Gap    (5-15)  


 


BUN    (7-18)  mg/dL


 


Creatinine    (0.7-1.3)  mg/dL


 


Est Cr Clr Drug Dosing    mL/min


 


Estimated GFR (MDRD)    (>60)  mL/min


 


BUN/Creatinine Ratio    (14-18)  


 


Glucose    ()  mg/dL


 


Lactic Acid   1.4  (0.4-2.0)  mmol/L


 


Calcium    (8.5-10.1)  mg/dL


 


Total Bilirubin    (0.2-1.0)  mg/dL


 


AST    (15-37)  U/L


 


ALT    (16-63)  U/L


 


Alkaline Phosphatase    ()  U/L


 


Total Protein    (6.4-8.2)  g/dl


 


Albumin    (3.4-5.0)  g/dl


 


Globulin    gm/dL


 


Albumin/Globulin Ratio    (1-2)  


 


Urine Color  Dark yellow   (Yellow)  


 


Urine Appearance  Cloudy H   (Clear)  


 


Urine pH  5.5   (5.0-8.0)  


 


Ur Specific Gravity  > or = 1.030   (1.005-1.030)  


 


Urine Protein  1+ H   (Negative)  


 


Urine Glucose (UA)  Negative   (Negative)  


 


Urine Ketones  Trace H   (Negative)  


 


Urine Occult Blood  1+ H   (Negative)  


 


Urine Nitrite  Positive H   (Negative)  


 


Urine Bilirubin  1+ H   (Negative)  


 


Urine Urobilinogen  1.0   (0.2-1.0)  


 


Ur Leukocyte Esterase  1+ H   (Negative)  


 


Urine RBC  5-10 H   (0-5)  /hpf


 


Urine WBC  40-50 H   (0-5)  /hpf


 


Ur Squamous Epith Cells  0-5   (0-5)  /hpf


 


Urine Bacteria  Many H   (FEW)  /hpf


 


Urine Mucus  Few   (FEW)  /hpf











Result Diagrams: 


 01/08/20 06:20





 01/08/20 06:20


Oren Results Last 24 hrs: 


 Microbiology











 01/07/20 16:53 Influenza Type A Antigen Screen - Final





 Nasal, Unspecified    NEGATIVE INFLUENZA A VIRUS AG





    REFERENCE RANGE: NEGATIVE





 Influenza Type B Antigen Screen - Final





    NEGATIVE INFLUENZA B VIRUS AG





    REFERENCE RANGE: NEGATIVE














Sepsis Event Note





- Evaluation


Sepsis Screening Result: No Definite Risk





- Focused Exam


Vital Signs: 


 Vital Signs











  Temp Pulse Resp BP Pulse Ox


 


 01/08/20 04:10  98.4 F  67  18  114/75  94 L


 


 01/07/20 23:27  97.9 F    


 


 01/07/20 23:23   69  22 H  120/88  97











Date Exam was Performed: 01/08/20


Time Exam was Performed: 12:40





- Problem List


(1) Pyelonephritis


SNOMED Code(s): 76124807


   ICD Code: N12 - TUBULO-INTERSTITIAL NEPHRITIS, NOT SPCF AS ACUTE OR CHRONIC 

  Status: Acute   Current Visit: Yes   





(2) Acute renal injury


SNOMED Code(s): 10742706, 54241344


   ICD Code: N17.9 - ACUTE KIDNEY FAILURE, UNSPECIFIED   Status: Acute   

Priority: High   Current Visit: Yes   





(3) Elevated lactic acid level


SNOMED Code(s): 7540171


   ICD Code: R79.89 - OTHER SPECIFIED ABNORMAL FINDINGS OF BLOOD CHEMISTRY   

Status: Acute   Priority: High   Current Visit: Yes   





(4) Hypoalbuminemia


SNOMED Code(s): 739052452


   ICD Code: E88.09 - OTH DISORDERS OF PLASMA-PROTEIN METABOLISM, NEC   Status: 

Acute   Priority: Medium   Current Visit: Yes   





(5) HTN (hypertension)


SNOMED Code(s): 44811055


   ICD Code: I10 - ESSENTIAL (PRIMARY) HYPERTENSION   Status: Chronic   Priority

: Medium   Current Visit: No   


Qualifiers: 


   Hypertension type: unspecified   Qualified Code(s): I10 - Essential (primary

) hypertension   





(6) AAA (abdominal aortic aneurysm)


SNOMED Code(s): 433433237


   ICD Code: I71.4 - ABDOMINAL AORTIC ANEURYSM, WITHOUT RUPTURE   Status: 

Chronic   Priority: Medium   Current Visit: No   


Qualifiers: 


   Presence of rupture: without rupture   Qualified Code(s): I71.4 - Abdominal 

aortic aneurysm, without rupture   





(7) COPD (chronic obstructive pulmonary disease)


SNOMED Code(s): 01671433


   ICD Code: J44.9 - CHRONIC OBSTRUCTIVE PULMONARY DISEASE, UNSPECIFIED   Status

: Chronic   Priority: Medium   Current Visit: No   


Qualifiers: 


   COPD type: unspecified COPD   Qualified Code(s): J44.9 - Chronic obstructive 

pulmonary disease, unspecified   





(8) Prostate disorder


SNOMED Code(s): 08286952


   ICD Code: N42.9 - DISORDER OF PROSTATE, UNSPECIFIED   Status: Chronic   

Priority: Medium   Current Visit: No   





(9) Recurrent UTI


SNOMED Code(s): 493355998


   ICD Code: N39.0 - URINARY TRACT INFECTION, SITE NOT SPECIFIED   Status: 

Chronic   Priority: High   Current Visit: Yes   





(10) Chronic back pain


SNOMED Code(s): 357360122


   ICD Code: M54.9 - DORSALGIA, UNSPECIFIED; G89.29 - OTHER CHRONIC PAIN   

Status: Chronic   Priority: Low   Current Visit: No   


Qualifiers: 


   Back pain location: back pain in unspecified location   Back pain laterality

: unspecified   Qualified Code(s): M54.9 - Dorsalgia, unspecified; G89.29 - 

Other chronic pain   


Problem List Initiated/Reviewed/Updated: Yes


Orders Last 24hrs: 


 Active Orders 24 hr











 Category Date Time Status


 


 Admission Status [Patient Status] [ADT] Routine ADT  01/07/20 21:53 Active


 


 Antiembolic Devices [RC] BID Care  01/07/20 23:01 Active


 


 Cardiac Monitoring [RC] INTERMITTENT Care  01/07/20 23:00 Active


 


 Height and Weight [RC] 04 Care  01/07/20 22:59 Active


 


 Intake and Output [RC] 04,16 Care  01/07/20 23:00 Active


 


 Oxygen Therapy [RC] PRN Care  01/07/20 22:59 Active


 


 Pulse Oximetry [RC] PRN Care  01/07/20 23:00 Active


 


 RT Aerosol Therapy [RC] ASDIRECTED Care  01/07/20 23:02 Active


 


 Up With Assistance [RC] BID Care  01/07/20 22:59 Active


 


 VTE/DVT Education [RC] DAILY Care  01/07/20 22:59 Active


 


 Vital Signs [RC] Q4HR Care  01/07/20 22:59 Active


 


 Consult to Case Management/ [CONS] Cons  01/07/20 22:59 Active





 Routine   


 


 Consult to Dietary [Consult to Dietician] [CONS] Cons  01/07/20 23:10 Active





 Routine   


 


 Consult to Spiritual Care [CONS] Routine Cons  01/07/20 22:59 Active


 


 OT Evaluation and Treatment [CONS] Routine Cons  01/07/20 22:59 Active


 


 PT Evaluation and Treatment [CONS] Routine Cons  01/07/20 22:59 Active


 


 Regular Diet [DIET] Diet  01/07/20 Breakfast Active


 


 BASIC METABOLIC PANEL,BMP [CHEM] AM Lab  01/08/20 05:11 Ordered


 


 BASIC METABOLIC PANEL,BMP [CHEM] AM Lab  01/09/20 05:11 Ordered


 


 BASIC METABOLIC PANEL,BMP [CHEM] AM Lab  01/10/20 05:11 Ordered


 


 BASIC METABOLIC PANEL,BMP [CHEM] AM Lab  01/11/20 05:11 Ordered


 


 BASIC METABOLIC PANEL,BMP [CHEM] AM Lab  01/12/20 05:11 Ordered


 


 BASIC METABOLIC PANEL,BMP [CHEM] AM Lab  01/13/20 05:11 Ordered


 


 BASIC METABOLIC PANEL,BMP [CHEM] AM Lab  01/14/20 05:11 Ordered


 


 BMP [BASIC METABOLIC PANEL,BMP] [CHEM] Stat Lab  01/07/20 23:11 Ordered


 


 C-REACTIVE PROTEIN [CHEM] AM Lab  01/08/20 05:11 Ordered


 


 C-REACTIVE PROTEIN [CHEM] AM Lab  01/09/20 05:11 Ordered


 


 C-REACTIVE PROTEIN [CHEM] AM Lab  01/10/20 05:11 Ordered


 


 C-REACTIVE PROTEIN [CHEM] AM Lab  01/11/20 05:11 Ordered


 


 C-REACTIVE PROTEIN [CHEM] AM Lab  01/12/20 05:11 Ordered


 


 C-REACTIVE PROTEIN [CHEM] AM Lab  01/13/20 05:11 Ordered


 


 C-REACTIVE PROTEIN [CHEM] AM Lab  01/14/20 05:11 Ordered


 


 CBC WITH AUTO DIFF [HEME] AM Lab  01/08/20 05:11 Ordered


 


 CBC WITH AUTO DIFF [HEME] AM Lab  01/09/20 05:11 Ordered


 


 CBC WITH AUTO DIFF [HEME] AM Lab  01/10/20 05:11 Ordered


 


 CBC WITH AUTO DIFF [HEME] AM Lab  01/11/20 05:11 Ordered


 


 CBC WITH AUTO DIFF [HEME] AM Lab  01/12/20 05:11 Ordered


 


 CBC WITH AUTO DIFF [HEME] AM Lab  01/13/20 05:11 Ordered


 


 CBC WITH AUTO DIFF [HEME] AM Lab  01/14/20 05:11 Ordered


 


 CULTURE BLOOD [BC] Stat Lab  01/07/20 17:18 Received


 


 CULTURE BLOOD [BC] Stat Lab  01/07/20 17:26 Received


 


 CULTURE SPUTUM + SMEAR [RM] Routine Lab  01/07/20 23:07 Ordered


 


 CULTURE URINE [RM] Stat Lab  01/07/20 20:26 Received


 


 LACTATE SEPSIS W/ REFLEX [CHEM] Routine Lab  01/07/20 23:06 Ordered


 


 MAGNESIUM [CHEM] AM Lab  01/08/20 05:11 Ordered


 


 MAGNESIUM [CHEM] AM Lab  01/09/20 05:11 Ordered


 


 MAGNESIUM [CHEM] AM Lab  01/10/20 05:11 Ordered


 


 MAGNESIUM [CHEM] AM Lab  01/11/20 05:11 Ordered


 


 MAGNESIUM [CHEM] AM Lab  01/12/20 05:11 Ordered


 


 MAGNESIUM [CHEM] AM Lab  01/13/20 05:11 Ordered


 


 MAGNESIUM [CHEM] AM Lab  01/14/20 05:11 Ordered


 


 RESPIRATORY PANEL PCR [MREF] Stat Lab  01/07/20 23:05 Received


 


 Acetaminophen [Tylenol] Med  01/07/20 22:59 Active





 650 mg PO Q4H PRN   


 


 Acetaminophen/HYDROcodone [Norco 325-5 MG] Med  01/07/20 22:59 Active





 1 tab PO Q4H PRN   


 


 Albuterol/Ipratropium [DuoNeb 3.0-0.5 MG/3 ML] Med  01/07/20 22:59 Active





 3 ml NEB Q4H PRN   


 


 Calcium Carbonate [Tums] Med  01/08/20 09:00 Active





 200 mg PO DAILY   


 


 Cholecalciferol (Vitamin D3) [Vitamin D3] Med  01/08/20 09:00 Active





 25 mcg PO DAILY   


 


 Docusate Sodium [Colace] Med  01/07/20 22:59 Active





 100 mg PO BID PRN   


 


 Docusate Sodium/Sennosides [Senna Plus] Med  01/07/20 22:59 Active





 1 tab PO BID PRN   


 


 HYDROmorphone [Dilaudid] Med  01/07/20 22:59 Active





 0.25 mg IVPUSH Q2H PRN   


 


 Ibuprofen [Motrin] Med  01/07/20 22:59 Active





 600 mg PO Q6H PRN   


 


 Ketorolac [Toradol] Med  01/07/20 22:59 Active





 15 mg IV Q6H PRN   


 


 Lactated Ringers [Ringers, Lactated] 1,000 ml Med  01/07/20 23:00 Active





 IV ASDIRECTED   


 


 Magnesium Hydroxide [Milk of Magnesia] Med  01/07/20 22:59 Active





 30 ml PO Q12H PRN   


 


 Ondansetron [Zofran] Med  01/07/20 22:59 Active





 4 mg IV Q6H PRN   


 


 Polyethylene Glycol 3350 [MiraLAX] Med  01/07/20 22:59 Active





 17 gm PO DAILY PRN   


 


 Promethazine [Phenergan] 6.25 mg Med  01/07/20 22:59 Active





 Sodium Chloride 0.9% [Normal Saline] 50 ml   





 IV Q6H   


 


 Temazepam [Restoril] Med  01/07/20 22:59 Active





 7.5 mg PO BEDTIME PRN   


 


 amLODIPine [Norvasc] Med  01/08/20 09:00 Active





 5 mg PO DAILY   


 


 cefTRIAXone [Rocephin] 1 gm Med  01/08/20 21:00 Active





 Sodium Chloride 0.9% [Normal Saline] 100 ml   





 IV Q24H   


 


 Blood Culture x2 Reflex Set [OM.PC] Stat Oth  01/07/20 16:44 Ordered


 


 Sequential Compression Device [OM.PC] Per Unit Routine Oth  01/07/20 23:00 

Ordered








 Medication Orders





Acetaminophen (Tylenol)  650 mg PO Q4H PRN


   PRN Reason: Pain (Mild 1-3)/fever


   Last Admin: 01/08/20 03:34  Dose: 650 mg


Hydrocodone Bitart/Acetaminophen (Norco 325-5 Mg)  1 tab PO Q4H PRN


   PRN Reason: Pain (moderate 4-6)


Albuterol/Ipratropium (Duoneb 3.0-0.5 Mg/3 Ml)  3 ml NEB Q4H PRN


   PRN Reason: Shortness Of Breath/wheezing


Amlodipine Besylate (Norvasc)  5 mg PO DAILY [facility]


Calcium Carbonate/Glycine (Tums)  200 mg PO DAILY [facility]


Cholecalciferol (Vitamin D3)  25 mcg PO DAILY [facility]


Docusate Sodium (Colace)  100 mg PO BID PRN


   PRN Reason: Constipation


Hydromorphone HCl (Dilaudid)  0.25 mg IVPUSH Q2H PRN


   PRN Reason: Pain (severe 7-10)


Lactated Ringer's (Ringers, Lactated)  1,000 mls @ 25 mls/hr IV ASDIRECTED [facility]


   Last Admin: 01/08/20 00:05  Dose: 25 mls/hr


Promethazine HCl 6.25 mg/ (Sodium Chloride)  50.25 mls @ 100 mls/hr IV Q6H PRN


   PRN Reason: Nausea/Vomiting


Ceftriaxone Sodium 1 gm/ (Sodium Chloride)  100 mls @ 200 mls/hr IV Q24H [facility]


Ibuprofen (Motrin)  600 mg PO Q6H PRN


   PRN Reason: Pain (moderate 4-6)


Ketorolac Tromethamine (Toradol)  15 mg IV Q6H PRN


   PRN Reason: Pain (moderate 4-6)


   Last Admin: 01/08/20 03:41  Dose: 15 mg


Magnesium Hydroxide (Milk Of Magnesia)  30 ml PO Q12H PRN


   PRN Reason: Constipation


Ondansetron HCl (Zofran)  4 mg IV Q6H PRN


   PRN Reason: Nausea/Vomiting


Polyethylene Glycol (Miralax)  17 gm PO DAILY PRN


   PRN Reason: Constipation


Senna/Docusate Sodium (Senna Plus)  1 tab PO BID PRN


   PRN Reason: Constipation


Temazepam (Restoril)  7.5 mg PO BEDTIME PRN


   PRN Reason: Sleep








Assessment/Plan Comment:: 


I/P:





Acute:





Pyelonephritis 


   -Reports shakes, chills, not feeling well for last 24 hrs. Unknown if any 

fevers


   -Denies any urinary symptoms


   -History of recurrent UTIs, Prostate problems


   -WBC elevated at 24.90-->14.35


   -UA: Dark yellow, cloudy, concentrated, 1+ protein, trace ketones, 1+ occult 

blood, positive nitrite, 1+ bilirubin, 1+ leukocyte esterase, 5-10 RBC, 40-50 

WBC, many bacteria 


   -Urine culture showing gram negative rods so far


   -Blood cultures pending


   -CRP 10.2


   -Lactic acid 2.5-->1.4


   -2 gram Rocephin given in ED - continue at 1 gram


   -1L fluid bolus given in ED, IV fluids as ordered


   -Tylenol for fevers





Acute renal injury, improved 


   -Creatinine 1.4-->1.3


   -BUN 20-->19


   -eGFR 48-->53


   -IV fluids as ordered 





Tobacco use disorder


   -Nicotine patch


   -Cessation counseling





Hypoalbuminemia


   -Albumin 2.9


   -Dietary consult





Chronic:


HTN


AAA


COPD


Prostate disorder


Chronic back pain





Plan:


Admit to medical floor on telemetry


Other orders as indicated above


CM/SW for discharge planning


PT/OT


Routine AM labs


Home medications as ordered


Spiritual care consult 


VTE prophylaxis: SCDs


Code status: Full code; PCP: Dr. Cao [facility]





- Mortality Measure


Prognosis:: Good

## 2020-01-09 NOTE — PCM.PN
- General Info


Date of Service: 01/09/20


Admission Dx/Problem (Free Text): 


 Admission Diagnosis/Problem





Admission Diagnosis/Problem      Pyelonephritis








Functional Status: Reports: Pain Controlled, Tolerating Diet, Ambulating, 

Urinating.  Denies: New Symptoms





- Review of Systems


General: Reports: No Symptoms.  Denies: Fever (overnight but none today ), 

Weakness, Fatigue, Malaise, Chills


HEENT: Reports: No Symptoms.  Denies: Headaches, Sore Throat


Pulmonary: Reports: No Symptoms.  Denies: Shortness of Breath, Pleuritic Chest 

Pain, Cough, Sputum, Wheezing


Cardiovascular: Reports: No Symptoms.  Denies: Chest Pain, Palpitations, Edema


Gastrointestinal: Reports: No Symptoms.  Denies: Abdominal Pain, Constipation, 

Diarrhea, Nausea, Vomiting


Genitourinary: Reports: No Symptoms.  Denies: Pain


Musculoskeletal: Reports: No Symptoms


Skin: Reports: No Symptoms.  Denies: Cyanosis


Neurological: Reports: Pre-Existing Deficit (R foot drop - supposed to wear an 

AFO but has been refusing ).  Denies: Confusion, Difficulty Walking, Gait 

Disturbance


Psychiatric: Reports: No Symptoms.  Denies: Confusion





- Patient Data


Vitals - Most Recent: 


 Last Vital Signs











Temp  98.4 F   01/09/20 03:49


 


Pulse  73   01/09/20 03:33


 


Resp  16   01/09/20 03:33


 


BP  111/80   01/09/20 03:33


 


Pulse Ox  93 L  01/09/20 03:33











Weight - Most Recent: 180 lb 1.6 oz


I&O - Last 24 Hours: 


 Intake & Output











 01/08/20 01/09/20 01/09/20





 22:59 06:59 14:59


 


Intake Total 1180 1302 


 


Output Total 250 500 


 


Balance 930 802 











Lab Results Last 24 Hours: 


 Laboratory Results - last 24 hr











  01/08/20 01/08/20 01/09/20 Range/Units





  06:20 06:20 06:00 


 


WBC    11.06 H  (4.23-9.07)  K/mm3


 


RBC    3.92 L  (4.63-6.08)  M/mm3


 


Hgb    11.4 L  (13.7-17.5)  gm/dl


 


Hct    36.1 L  (40.1-51.0)  %


 


MCV    92.1  (79.0-92.2)  fl


 


MCH    29.1  (25.7-32.2)  pg


 


MCHC    31.6 L  (32.2-35.5)  g/dl


 


RDW Std Deviation    53.5 H  (35.1-43.9)  fL


 


Plt Count    183  (163-337)  K/mm3


 


MPV    10.3  (9.4-12.3)  fl


 


Neut % (Auto)    76.4 H  (34.0-67.9)  %


 


Lymph % (Auto)    11.8 L  (21.8-53.1)  %


 


Mono % (Auto)    8.2  (5.3-12.2)  %


 


Eos % (Auto)    3.0  (0.8-7.0)  


 


Baso % (Auto)    0.4  (0.1-1.2)  %


 


Neut # (Auto)    8.45 H  (1.78-5.38)  K/mm3


 


Lymph # (Auto)    1.31 L  (1.32-3.57)  K/mm3


 


Mono # (Auto)    0.91 H  (0.30-0.82)  K/mm3


 


Eos # (Auto)    0.33  (0.04-0.54)  K/mm3


 


Baso # (Auto)    0.04  (0.01-0.08)  K/mm3


 


Manual Slide Review      


 


Sodium   141   (136-145)  mEq/L


 


Potassium   4.1   (3.5-5.1)  mEq/L


 


Chloride   108 H   ()  mEq/L


 


Carbon Dioxide   23   (21-32)  mEq/L


 


Anion Gap   14.1   (5-15)  


 


BUN   19 H   (7-18)  mg/dL


 


Creatinine   1.3   (0.7-1.3)  mg/dL


 


Est Cr Clr Drug Dosing   47.80   mL/min


 


Estimated GFR (MDRD)   53   (>60)  mL/min


 


BUN/Creatinine Ratio   14.6   (14-18)  


 


Glucose   101   ()  mg/dL


 


Calcium   8.3 L   (8.5-10.1)  mg/dL


 


Magnesium   2.0   (1.8-2.4)  mg/dl


 


C-Reactive Protein   10.2 H*   (<1.0)  mg/dL














  01/09/20 Range/Units





  06:00 


 


WBC   (4.23-9.07)  K/mm3


 


RBC   (4.63-6.08)  M/mm3


 


Hgb   (13.7-17.5)  gm/dl


 


Hct   (40.1-51.0)  %


 


MCV   (79.0-92.2)  fl


 


MCH   (25.7-32.2)  pg


 


MCHC   (32.2-35.5)  g/dl


 


RDW Std Deviation   (35.1-43.9)  fL


 


Plt Count   (163-337)  K/mm3


 


MPV   (9.4-12.3)  fl


 


Neut % (Auto)   (34.0-67.9)  %


 


Lymph % (Auto)   (21.8-53.1)  %


 


Mono % (Auto)   (5.3-12.2)  %


 


Eos % (Auto)   (0.8-7.0)  


 


Baso % (Auto)   (0.1-1.2)  %


 


Neut # (Auto)   (1.78-5.38)  K/mm3


 


Lymph # (Auto)   (1.32-3.57)  K/mm3


 


Mono # (Auto)   (0.30-0.82)  K/mm3


 


Eos # (Auto)   (0.04-0.54)  K/mm3


 


Baso # (Auto)   (0.01-0.08)  K/mm3


 


Manual Slide Review   


 


Sodium  139  (136-145)  mEq/L


 


Potassium  4.0  (3.5-5.1)  mEq/L


 


Chloride  106  ()  mEq/L


 


Carbon Dioxide  23  (21-32)  mEq/L


 


Anion Gap  14.0  (5-15)  


 


BUN  18  (7-18)  mg/dL


 


Creatinine  1.3  (0.7-1.3)  mg/dL


 


Est Cr Clr Drug Dosing  47.57  mL/min


 


Estimated GFR (MDRD)  53  (>60)  mL/min


 


BUN/Creatinine Ratio  13.8 L  (14-18)  


 


Glucose  121 H  ()  mg/dL


 


Calcium  8.6  (8.5-10.1)  mg/dL


 


Magnesium  2.1  (1.8-2.4)  mg/dl


 


C-Reactive Protein  8.4 H*  (<1.0)  mg/dL











Oren Results Last 24 Hours: 


 Microbiology











 01/07/20 17:26 Aerobic Blood Culture - Preliminary





 Blood - Venous - Lab Draw    NO GROWTH AFTER 1 DAY





 Anaerobic Blood Culture - Preliminary





    NO GROWTH AFTER 1 DAY


 


 01/07/20 17:18 Aerobic Blood Culture - Preliminary





 Blood - Venous    NO GROWTH AFTER 1 DAY





 Anaerobic Blood Culture - Preliminary





    NO GROWTH AFTER 1 DAY


 


 01/07/20 20:26 Urine Culture - Preliminary





 Urine, Clean Catch    Gram Negative Rods











Med Orders - Current: 


 Current Medications





Acetaminophen (Tylenol)  650 mg PO Q4H PRN


   PRN Reason: Pain (Mild 1-3)/fever


   Last Admin: 01/08/20 03:34 Dose:  650 mg


Hydrocodone Bitart/Acetaminophen (Norco 325-5 Mg)  1 tab PO Q4H PRN


   PRN Reason: Pain (moderate 4-6)


   Last Admin: 01/09/20 00:01 Dose:  1 tab


Albuterol/Ipratropium (Duoneb 3.0-0.5 Mg/3 Ml)  3 ml NEB Q4H PRN


   PRN Reason: Shortness Of Breath/wheezing


Amlodipine Besylate (Norvasc)  5 mg PO DAILY [facility]


   Last Admin: 01/08/20 11:00 Dose:  Not Given


Calcium Carbonate/Glycine (Tums)  200 mg PO DAILY [facility]


   Last Admin: 01/08/20 11:01 Dose:  Not Given


Cholecalciferol (Vitamin D3)  25 mcg PO DAILY [facility]


   Last Admin: 01/08/20 09:42 Dose:  25 mcg


Docusate Sodium (Colace)  100 mg PO BID PRN


   PRN Reason: Constipation


Hydromorphone HCl (Dilaudid)  0.25 mg IVPUSH Q2H PRN


   PRN Reason: Pain (severe 7-10)


Lactated Ringer's (Ringers, Lactated)  1,000 mls @ 25 mls/hr IV ASDIRECTED [facility]


   Last Admin: 01/09/20 00:19 Dose:  25 mls/hr


Promethazine HCl 6.25 mg/ (Sodium Chloride)  50.25 mls @ 100 mls/hr IV Q6H PRN


   PRN Reason: Nausea/Vomiting


Piperacillin Sod/Tazobactam (Sod 4.5 gm/ Sodium Chloride)  100 mls @ 25 mls/hr 

IV Q8H [facility]


Ibuprofen (Motrin)  600 mg PO Q6H PRN


   PRN Reason: Pain (moderate 4-6)


Ketorolac Tromethamine (Toradol)  15 mg IVPUSH Q6H PRN


   PRN Reason: Pain (moderate 4-6)


   Last Admin: 01/08/20 21:56 Dose:  15 mg


Magnesium Hydroxide (Milk Of Magnesia)  30 ml PO Q12H PRN


   PRN Reason: Constipation


Ondansetron HCl (Zofran)  4 mg IV Q6H PRN


   PRN Reason: Nausea/Vomiting


Polyethylene Glycol (Miralax)  17 gm PO DAILY PRN


   PRN Reason: Constipation


Senna/Docusate Sodium (Senna Plus)  1 tab PO BID PRN


   PRN Reason: Constipation


Temazepam (Restoril)  7.5 mg PO BEDTIME PRN


   PRN Reason: Sleep





Discontinued Medications





Acetaminophen (Tylenol)  975 mg PO ONETIME ONE


   Stop: 01/07/20 20:14


   Last Admin: 01/07/20 20:19 Dose:  975 mg


Lactated Ringer's (Ringers, Lactated)  500 mls @ 999 mls/hr IV .BOLUS ONE


   Stop: 01/07/20 17:27


   Last Admin: 01/07/20 17:22 Dose:  999 mls/hr


Lactated Ringer's (Ringers, Lactated)  1,000 mls @ 150 mls/hr IV ASDIRECTED [facility]


   Last Admin: 01/07/20 18:37 Dose:  150 mls/hr


Lactated Ringer's (Ringers, Lactated)  500 mls @ 999 mls/hr IV .BOLUS ONE


   Stop: 01/07/20 19:23


   Last Admin: 01/07/20 18:00 Dose:  999 mls/hr


Ceftriaxone Sodium 2 gm/ (Sodium Chloride)  100 mls @ 200 mls/hr IV ONETIME ONE


   Stop: 01/07/20 21:41


   Last Admin: 01/07/20 21:58 Dose:  200 mls/hr


Sodium Chloride (Normal Saline)  1,000 mls @ 999 mls/hr IV ONETIME ONE


   Stop: 01/07/20 22:16


   Last Admin: 01/07/20 21:39 Dose:  999 mls/hr


Sodium Chloride (Normal Saline) Confirm Administered Dose 1,000 mls @ as 

directed .ROUTE .STK-MED ONE


   Stop: 01/07/20 21:34


   Last Admin: 01/07/20 21:38 Dose:  Not Given


Ceftriaxone Sodium 1 gm/ (Sodium Chloride)  100 mls @ 200 mls/hr IV Q24H [facility]


   Last Admin: 01/08/20 20:39 Dose:  200 mls/hr


Piperacillin Sod/Tazobactam (Sod 4.5 gm/ Sodium Chloride)  100 mls @ 200 mls/hr 

IV ONETIME ONE


   Stop: 01/09/20 00:14


   Last Admin: 01/09/20 00:12 Dose:  200 mls/hr


Ketorolac Tromethamine (Toradol)  15 mg IV Q6H PRN


   PRN Reason: Pain (moderate 4-6)


   Last Admin: 01/08/20 03:41 Dose:  15 mg











- Exam


General: Alert, Oriented, Cooperative, No Acute Distress


HEENT: Pupils Equal, Pupils Reactive, Mucous Membr. Moist/Pink


Neck: Supple, Trachea Midline


Lungs: Clear to Auscultation, Normal Respiratory Effort


Cardiovascular: Regular Rate, Regular Rhythm


GI/Abdominal Exam: Normal Bowel Sounds, Soft, Non-Tender, No Distention, No 

Abnormal Bruit


 (Male) Exam: Deferred


Back Exam: Normal Inspection, Full Range of Motion, CVA Tenderness (R).  No: 

CVA Tenderness (L)


Extremities: Normal Inspection, Normal Range of Motion, Non-Tender, No Pedal 

Edema, Normal Capillary Refill


Peripheral Pulses: 2+: Radial (L), Radial (R), Dorsalis Pedis (L), Dorsalis 

Pedis (R)


Skin: Warm, Dry, Intact


Neurological: No New Focal Deficit


Psy/Mental Status: Alert, Normal Affect, Normal Mood





Sepsis Event Note





- Evaluation


Sepsis Screening Result: Sepsis Risk





- Focused Exam


Vital Signs: 


 Vital Signs











  Temp Temp Pulse Resp BP Pulse Ox


 


 01/09/20 03:49   98.4 F    


 


 01/09/20 03:33    73  16  111/80  93 L


 


 01/09/20 00:53  100.2 F   76    93 L


 


 01/09/20 00:52    77  18  121/73  92 L


 


 01/09/20 00:07  101.8 F H   78  22 H  128/87  94 L


 


 01/08/20 20:34  99.9 F   66  20  135/79  95











Date Exam was Performed: 01/09/20


Time Exam was Performed: 12:33





- Problem List & Annotations


(1) Pyelonephritis


SNOMED Code(s): 92060268


   Code(s): N12 - TUBULO-INTERSTITIAL NEPHRITIS, NOT SPCF AS ACUTE OR CHRONIC   

Status: Acute   Current Visit: Yes   





(2) Acute renal injury


SNOMED Code(s): 93640744, 08480664


   Code(s): N17.9 - ACUTE KIDNEY FAILURE, UNSPECIFIED   Status: Acute   Priority

: High   Current Visit: Yes   





(3) Elevated lactic acid level


SNOMED Code(s): 1530119


   Code(s): R79.89 - OTHER SPECIFIED ABNORMAL FINDINGS OF BLOOD CHEMISTRY   

Status: Acute   Priority: High   Current Visit: Yes   





(4) Hypoalbuminemia


SNOMED Code(s): 821533852


   Code(s): E88.09 - OTH DISORDERS OF PLASMA-PROTEIN METABOLISM, NEC   Status: 

Acute   Priority: Medium   Current Visit: Yes   





(5) HTN (hypertension)


SNOMED Code(s): 99441351


   Code(s): I10 - ESSENTIAL (PRIMARY) HYPERTENSION   Status: Chronic   Priority

: Medium   Current Visit: No   


Qualifiers: 


   Hypertension type: unspecified   Qualified Code(s): I10 - Essential (primary

) hypertension   





(6) AAA (abdominal aortic aneurysm)


SNOMED Code(s): 831631981


   Code(s): I71.4 - ABDOMINAL AORTIC ANEURYSM, WITHOUT RUPTURE   Status: 

Chronic   Priority: Medium   Current Visit: No   


Qualifiers: 


   Presence of rupture: without rupture   Qualified Code(s): I71.4 - Abdominal 

aortic aneurysm, without rupture   





(7) COPD (chronic obstructive pulmonary disease)


SNOMED Code(s): 06484942


   Code(s): J44.9 - CHRONIC OBSTRUCTIVE PULMONARY DISEASE, UNSPECIFIED   Status

: Chronic   Priority: Medium   Current Visit: No   


Qualifiers: 


   COPD type: unspecified COPD   Qualified Code(s): J44.9 - Chronic obstructive 

pulmonary disease, unspecified   





(8) Prostate disorder


SNOMED Code(s): 21229847


   Code(s): N42.9 - DISORDER OF PROSTATE, UNSPECIFIED   Status: Chronic   

Priority: Medium   Current Visit: No   





(9) Recurrent UTI


SNOMED Code(s): 570703586


   Code(s): N39.0 - URINARY TRACT INFECTION, SITE NOT SPECIFIED   Status: 

Chronic   Priority: High   Current Visit: Yes   





(10) Chronic back pain


SNOMED Code(s): 067851431


   Code(s): M54.9 - DORSALGIA, UNSPECIFIED; G89.29 - OTHER CHRONIC PAIN   Status

: Chronic   Priority: Low   Current Visit: No   


Qualifiers: 


   Back pain location: back pain in unspecified location   Back pain laterality

: unspecified   Qualified Code(s): M54.9 - Dorsalgia, unspecified; G89.29 - 

Other chronic pain   





(11) Fever


SNOMED Code(s): 106058152


   Code(s): R50.9 - FEVER, UNSPECIFIED   Status: Acute   Priority: High   

Current Visit: Yes   


Qualifiers: 


   Fever type: unspecified   Qualified Code(s): R50.9 - Fever, unspecified   





- Problem List Review


Problem List Initiated/Reviewed/Updated: Yes





- My Orders


Last 24 Hours: 


My Active Orders





01/08/20 08:38


Resuscitation Status Routine 














- Plan


Plan:: 


I/P:





Acute:





Pyelonephritis 


   -Reports shakes, chills, not feeling well for last 24 hrs. Unknown if any 

fevers


   -Denies any urinary symptoms


   -History of recurrent UTIs, Prostate problems


   -WBC elevated at 24.90-->14.35-->11.06


   -UA: Dark yellow, cloudy, concentrated, 1+ protein, trace ketones, 1+ occult 

blood, positive nitrite, 1+ bilirubin, 1+ leukocyte esterase, 5-10 RBC, 40-50 

WBC, many bacteria 


   -Urine culture showing E. Coli


   -Blood cultures negative


   -CRP 10.2


   -Lactic acid 2.5-->1.4


   -2 gram Rocephin given in ED - continued at 1 gram; Fever overnight - 

switched to zosyn


   -1L fluid bolus given in ED, IV fluids as ordered


   -Tylenol for fevers


   -Reported fever overnight, nothing today 





Acute renal injury, improved 


   -Creatinine 1.4-->1.3-->1.3


   -BUN 20-->19-->18


   -eGFR 48-->53-->53


   -IV fluids as ordered


   -Unknown baseline  





Tobacco use disorder


   -Nicotine patch


   -Cessation counseling





Hypoalbuminemia


   -Albumin 2.9


   -Dietary consult





Chronic:


HTN


AAA


COPD


Prostate disorder


Chronic back pain





Plan:


Admit to medical floor on telemetry


Other orders as indicated above


CM/SW for discharge planning


PT/OT


Routine AM labs


Home medications as ordered


Spiritual care consult 


VTE prophylaxis: SCDs


Code status: Full code; PCP: [facility] Ca[facility]

## 2020-01-10 NOTE — PCM.DCSUM1
**Discharge Summary





- Hospital Course


Diagnosis: Stroke: No





- Discharge Data


Discharge Date: 01/10/20 (Admit date: 1/7/20)


Discharge Disposition: Home, Self-Care 01


Condition: Good





- Referral to Home Health


Primary Care Physician: 


Melanie Cao MD








- Discharge Diagnosis/Problem(s)


(1) Pyelonephritis


SNOMED Code(s): 33067204


   ICD Code: N12 - TUBULO-INTERSTITIAL NEPHRITIS, NOT SPCF AS ACUTE OR CHRONIC 

  Status: Acute   Priority: High   Current Visit: Yes   





(2) Acute renal injury


SNOMED Code(s): 98894304, 97576895


   ICD Code: N17.9 - ACUTE KIDNEY FAILURE, UNSPECIFIED   Status: Acute   

Priority: High   Current Visit: Yes   





(3) Elevated lactic acid level


SNOMED Code(s): 0203198


   ICD Code: R79.89 - OTHER SPECIFIED ABNORMAL FINDINGS OF BLOOD CHEMISTRY   

Status: Acute   Priority: High   Current Visit: Yes   





(4) Hypoalbuminemia


SNOMED Code(s): 104134925


   ICD Code: E88.09 - OTH DISORDERS OF PLASMA-PROTEIN METABOLISM, NEC   Status: 

Acute   Priority: Medium   Current Visit: Yes   





(5) HTN (hypertension)


SNOMED Code(s): 62425243


   ICD Code: I10 - ESSENTIAL (PRIMARY) HYPERTENSION   Status: Chronic   Priority

: Medium   Current Visit: No   


Qualifiers: 


   Hypertension type: unspecified   Qualified Code(s): I10 - Essential (primary

) hypertension   





(6) AAA (abdominal aortic aneurysm)


SNOMED Code(s): 012901893


   ICD Code: I71.4 - ABDOMINAL AORTIC ANEURYSM, WITHOUT RUPTURE   Status: 

Chronic   Priority: Medium   Current Visit: No   


Qualifiers: 


   Presence of rupture: without rupture   Qualified Code(s): I71.4 - Abdominal 

aortic aneurysm, without rupture   





(7) COPD (chronic obstructive pulmonary disease)


SNOMED Code(s): 39626313


   ICD Code: J44.9 - CHRONIC OBSTRUCTIVE PULMONARY DISEASE, UNSPECIFIED   Status

: Chronic   Priority: Medium   Current Visit: No   


Qualifiers: 


   COPD type: unspecified COPD   Qualified Code(s): J44.9 - Chronic obstructive 

pulmonary disease, unspecified   





(8) Prostate disorder


SNOMED Code(s): 29041550


   ICD Code: N42.9 - DISORDER OF PROSTATE, UNSPECIFIED   Status: Chronic   

Priority: Medium   Current Visit: No   





(9) Chronic back pain


SNOMED Code(s): 449525533


   ICD Code: M54.9 - DORSALGIA, UNSPECIFIED; G89.29 - OTHER CHRONIC PAIN   

Status: Chronic   Priority: Low   Current Visit: No   


Qualifiers: 


   Back pain location: back pain in unspecified location   Back pain laterality

: unspecified   Qualified Code(s): M54.9 - Dorsalgia, unspecified; G89.29 - 

Other chronic pain   





(10) Fever


SNOMED Code(s): 608996756


   ICD Code: R50.9 - FEVER, UNSPECIFIED   Status: Resolved   Priority: High   

Current Visit: Yes   


Qualifiers: 


   Fever type: unspecified   Qualified Code(s): R50.9 - Fever, unspecified   





- Patient Summary/Data


Consults: 


 Consultations





01/07/20 22:59


Consult to Case Management/ [CONS] Routine 


Consult to Spiritual Care [CONS] Routine 


OT Evaluation and Treatment [CONS] Routine 


PT Evaluation and Treatment [CONS] Routine 





01/07/20 23:10


Consult to Dietary [Consult to Dietician] [CONS] Routine 











Labs Pending at D/C: 


None 





Recommended Follow-up Testing/Procedures: 


Follow-up with PCP within 5-7 days of discharge. 





Hospital Course: 


Leighton was admitted to the floor with pyelonephritis.  He was started on IV 

Rocephin 2 g in the ED and this was decreased to 1 g on the floor.  He did have 

one episode of a fever and his antibiotic was switched to every 8 hours Zosyn.  

He has had no fevers since then.  His white count, CRP, and lactic acid did all 

trend down.  His creatinine was elevated on admission and with gentle IV 

hydration this did respond appropriately.  He reports he feels pretty good.  He 

did had some noted urinary retention and difficulty with urination so he was 

started on 0.4 mg Flomax daily.  Blood cultures remain negative throughout his 

stay.  Urine cultures grew out mostly sensitive E. coli with good sensitivities 

to Rocephin.  He was switched to p.o. Keflex 500 mg every 6 hours for total of 

7 days of treatment.  He continued to have some right-sided CVA tenderness 

reported was worse with urination.  He will be prescribed as needed Norco for 

this pain.  He also was advised that he can take just over-the-counter Tylenol 

if that better suits him.  He was advised that this may take some time to clear 

up.  He was advised to follow-up with his primary care provider within 5 to 7 

days of discharge, sooner if needed.  He was told that if symptoms return or 

worsen he should contact his primary care provider, walk-in clinic, or return 

the emergency room.  PT/OT did see the patient and were recommending outpatient 

treatment, however the patient is refusing this.  He does smoke a tobacco pipe 

and was refusing nicotine patches here.  He refused any patches on discharge.  

He was provided a list of resources for tobacco cessation on discharge.  He was 

discharged today.  Home meds were otherwise continued.





- Patient Instructions


Diet: Usual Diet as Tolerated


Activity: As Tolerated


Driving: Do Not Drive (while on narcotics)


Showering/Bathing: May Shower


Notify Provider of: Fever, Increased Pain, Nausea and/or Vomiting


Other/Special Instructions: Follow-up with primary care provider within 5-7 

days of discharge, sooner if needed.  Resume home medications as indicated.  Be 

cautious with pain pills. Only take when needed. Do not drive or operate 

machinery while on them.  Take all of your antibiotic until completed. Even if 

you feel 100% better.  Should symptoms return or worsen contact primary care 

provider, a walk-in clinic, or return to the emergency department.





- Discharge Plan


*PRESCRIPTION DRUG MONITORING PROGRAM REVIEWED*: No


*COPY OF PRESCRIPTION DRUG MONITORING REPORT IN PATIENT MEG: No


Prescriptions/Med Rec: 


Acetaminophen/HYDROcodone [Norco 325-5 MG] 1 tab PO Q6H PRN #10 tablet


 PRN Reason: Pain (Moderate 4-6)


cephALEXin [Keflex] 500 mg PO Q6H #18 cap


Tamsulosin [Flomax] 0.4 mg PO PCBREAKFAST #20 cap.er


Home Medications: 


 Home Meds





Ascorbic Acid [Vitamin C] 1,000 mg PO DAILY 11/28/18 [History]


Calcium Carbonate [Calcium] 500 mg PO DAILY 11/28/18 [History]


Cholecalciferol (Vitamin D3) [Vitamin D3] 1,000 unit PO DAILY 11/28/18 [History]


Vitamin B Complex 1 cap PO DAILY 11/28/18 [History]


amLODIPine Besylate [Amlodipine Besylate] 5 mg PO DAILY 11/28/18 [History]


Acetaminophen/HYDROcodone [Norco 325-5 MG] 1 tab PO Q6H PRN #10 tablet 01/10/20 

[Rx]


Tamsulosin [Flomax] 0.4 mg PO PCBREAKFAST #20 cap.er 01/10/20 [Rx]


cephALEXin [Keflex] 500 mg PO Q6H #18 cap 01/10/20 [Rx]








Oxygen Therapy Mode: Room Air


Patient Handouts:  Pyelonephritis, Adult, Easy-to-Read, Urinary Tract Infection

, Adult, Easy-to-Read, Sepsis, Adult, You've Been Prescribed an Antibiotic in 

the Hospital for an Infection - [facility] (04/2018), Steps to Quit Smoking


Forms:  ED Department Discharge


Referrals: 


Melanie Cao MD [Primary Care Provider] - 





- Discharge Summary/Plan Comment


DC Time >30 min.: Yes (45 mins )





- General Info


Date of Service: 01/10/20


Admission Dx/Problem (Free Text: 


 Admission Diagnosis/Problem





Admission Diagnosis/Problem      Pyelonephritis








Functional Status: Reports: Pain Controlled, Tolerating Diet, Ambulating, 

Urinating.  Denies: New Symptoms





- Review of Systems


General: Reports: No Symptoms.  Denies: Fever, Weakness, Fatigue, Malaise, 

Chills


HEENT: Reports: No Symptoms.  Denies: Headaches, Sore Throat


Pulmonary: Reports: No Symptoms.  Denies: Shortness of Breath, Pleuritic Chest 

Pain


Cardiovascular: Reports: No Symptoms.  Denies: Chest Pain, Palpitations, Edema


Gastrointestinal: Reports: No Symptoms.  Denies: Abdominal Pain, Constipation, 

Diarrhea, Nausea, Vomiting


Genitourinary: Reports: No Symptoms, Pain (occasional right sided back pain 

with urination).  Denies: Frequency


Musculoskeletal: Reports: No Symptoms


Skin: Reports: No Symptoms.  Denies: Cyanosis


Neurological: Reports: No Symptoms.  Denies: Pre-Existing Deficit, Difficulty 

Walking, Gait Disturbance


Psychiatric: Reports: No Symptoms





- Patient Data


Vitals - Most Recent: 


 Last Vital Signs











Temp  98.2 F   01/10/20 02:30


 


Pulse  61   01/10/20 02:30


 


Resp  20   01/10/20 02:30


 


BP  120/69   01/10/20 02:30


 


Pulse Ox  93 L  01/10/20 02:30











Weight - Most Recent: 179 lb 4.8 oz


I&O - Last 24 hours: 


 Intake & Output











 01/09/20 01/10/20 01/10/20





 22:59 06:59 14:59


 


Intake Total 1080 550 


 


Output Total 375 425 


 


Balance 705 125 











Lab Results - Last 24 hrs: 


 Laboratory Results - last 24 hr











  01/08/20 01/10/20 01/10/20 Range/Units





  04:00 05:30 05:30 


 


WBC   7.00   (4.23-9.07)  K/mm3


 


RBC   3.62 L   (4.63-6.08)  M/mm3


 


Hgb   10.6 L   (13.7-17.5)  gm/dl


 


Hct   33.3 L   (40.1-51.0)  %


 


MCV   92.0   (79.0-92.2)  fl


 


MCH   29.3   (25.7-32.2)  pg


 


MCHC   31.8 L   (32.2-35.5)  g/dl


 


RDW Std Deviation   53.4 H   (35.1-43.9)  fL


 


Plt Count   162 L   (163-337)  K/mm3


 


MPV   10.6   (9.4-12.3)  fl


 


Neut % (Auto)   54.7   (34.0-67.9)  %


 


Lymph % (Auto)   22.7   (21.8-53.1)  %


 


Mono % (Auto)   12.7 H   (5.3-12.2)  %


 


Eos % (Auto)   9.1 H   (0.8-7.0)  


 


Baso % (Auto)   0.7   (0.1-1.2)  %


 


Neut # (Auto)   3.82   (1.78-5.38)  K/mm3


 


Lymph # (Auto)   1.59   (1.32-3.57)  K/mm3


 


Mono # (Auto)   0.89 H   (0.30-0.82)  K/mm3


 


Eos # (Auto)   0.64 H   (0.04-0.54)  K/mm3


 


Baso # (Auto)   0.05   (0.01-0.08)  K/mm3


 


Sodium    141  (136-145)  mEq/L


 


Potassium    3.9  (3.5-5.1)  mEq/L


 


Chloride    109 H  ()  mEq/L


 


Carbon Dioxide    22  (21-32)  mEq/L


 


Anion Gap    13.9  (5-15)  


 


BUN    15  (7-18)  mg/dL


 


Creatinine    1.2  (0.7-1.3)  mg/dL


 


Est Cr Clr Drug Dosing    51.54  mL/min


 


Estimated GFR (MDRD)    58  (>60)  mL/min


 


BUN/Creatinine Ratio    12.5 L  (14-18)  


 


Glucose    103  ()  mg/dL


 


Calcium    8.5  (8.5-10.1)  mg/dL


 


Magnesium    2.0  (1.8-2.4)  mg/dl


 


C-Reactive Protein    6.9 H*  (<1.0)  mg/dL


 


Adenovirus (PCR)  Not detected    (Not Detected)  


 


B. pertussis DNA (PCR)  Not detected    (Not Detected)  


 


B.parapertussis DNA PCR  Not detected    (Not Detected)  


 


C. pneumoniae DNA (PCR)  Not detected    (Not Detected)  


 


Coronavirus (PCR)  Not detected    (Not Detected)  


 


Human Metapneumovir PCR  Not detected    (Not Detected)  


 


Influenza A (RT-PCR)  Not detected    (Not Detected)  


 


Influenza B (RT-PCR)  Not detected    (Not Detected)  


 


M. pneumoniae (PCR)  Not detected    (Not Detected)  


 


Parainfluen 1,2,3,4 PCR  Not detected    (Not Detected)  


 


RSV (PCR)  Not detected    (Not Detected)  


 


Entero/Rhino (PCR)  Not detected    (Not Detected)  











BILLIE Results - Last 24 hrs: 


 Microbiology











 01/07/20 17:26 Aerobic Blood Culture - Preliminary





 Blood - Venous - Lab Draw    NO GROWTH AFTER 2 DAYS





 Anaerobic Blood Culture - Preliminary





    NO GROWTH AFTER 2 DAYS


 


 01/07/20 17:18 Aerobic Blood Culture - Preliminary





 Blood - Venous    NO GROWTH AFTER 2 DAYS





 Anaerobic Blood Culture - Preliminary





    NO GROWTH AFTER 2 DAYS


 


 01/07/20 20:26 Urine Culture - Final





 Urine, Clean Catch    Escherichia Coli











Med Orders - Current: 


 Current Medications





Acetaminophen (Tylenol)  650 mg PO Q4H PRN


   PRN Reason: Pain (Mild 1-3)/fever


   Last Admin: 01/10/20 00:07 Dose:  650 mg


Hydrocodone Bitart/Acetaminophen (Norco 325-5 Mg)  1 tab PO Q4H PRN


   PRN Reason: Pain (moderate 4-6)


   Last Admin: 01/10/20 08:30 Dose:  1 tab


Albuterol/Ipratropium (Duoneb 3.0-0.5 Mg/3 Ml)  3 ml NEB Q4H PRN


   PRN Reason: Shortness Of Breath/wheezing


Amlodipine Besylate (Norvasc)  5 mg PO DAILY [facility]


   Last Admin: 01/09/20 08:46 Dose:  Not Given


Calcium Carbonate/Glycine (Tums)  200 mg PO DAILY [facility]


   Last Admin: 01/09/20 08:47 Dose:  Not Given


Cephalexin (Keflex)  500 mg PO Q6H [facility]


Cholecalciferol (Vitamin D3)  25 mcg PO DAILY [facility]


   Last Admin: 01/10/20 08:29 Dose:  25 mcg


Docusate Sodium (Colace)  100 mg PO BID PRN


   PRN Reason: Constipation


Hydromorphone HCl (Dilaudid)  0.25 mg IVPUSH Q2H PRN


   PRN Reason: Pain (severe 7-10)


Promethazine HCl 6.25 mg/ (Sodium Chloride)  50.25 mls @ 100 mls/hr IV Q6H PRN


   PRN Reason: Nausea/Vomiting


Piperacillin Sod/Tazobactam (Sod 4.5 gm/ Sodium Chloride)  100 mls @ 25 mls/hr 

IV Q8H [facility]


   Stop: 01/10/20 12:00


   Last Admin: 01/10/20 08:19 Dose:  25 mls/hr


Ibuprofen (Motrin)  600 mg PO Q6H PRN


   PRN Reason: Pain (moderate 4-6)


Ketorolac Tromethamine (Toradol)  15 mg IVPUSH Q6H PRN


   PRN Reason: Pain (moderate 4-6)


   Last Admin: 01/08/20 21:56 Dose:  15 mg


Magnesium Hydroxide (Milk Of Magnesia)  30 ml PO Q12H PRN


   PRN Reason: Constipation


Ondansetron HCl (Zofran)  4 mg IV Q6H PRN


   PRN Reason: Nausea/Vomiting


Polyethylene Glycol (Miralax)  17 gm PO DAILY PRN


   PRN Reason: Constipation


Senna/Docusate Sodium (Senna Plus)  1 tab PO BID PRN


   PRN Reason: Constipation


Tamsulosin HCl (Flomax)  0.4 mg PO PCBREAKFAST [facility]


Temazepam (Restoril)  7.5 mg PO BEDTIME PRN


   PRN Reason: Sleep





Discontinued Medications





Acetaminophen (Tylenol)  975 mg PO ONETIME ONE


   Stop: 01/07/20 20:14


   Last Admin: 01/07/20 20:19 Dose:  975 mg


Lactated Ringer's (Ringers, Lactated)  500 mls @ 999 mls/hr IV .BOLUS ONE


   Stop: 01/07/20 17:27


   Last Admin: 01/07/20 17:22 Dose:  999 mls/hr


Lactated Ringer's (Ringers, Lactated)  1,000 mls @ 150 mls/hr IV ASDIRECTED [facility]


   Last Admin: 01/07/20 18:37 Dose:  150 mls/hr


Lactated Ringer's (Ringers, Lactated)  500 mls @ 999 mls/hr IV .BOLUS ONE


   Stop: 01/07/20 19:23


   Last Admin: 01/07/20 18:00 Dose:  999 mls/hr


Ceftriaxone Sodium 2 gm/ (Sodium Chloride)  100 mls @ 200 mls/hr IV ONETIME ONE


   Stop: 01/07/20 21:41


   Last Admin: 01/07/20 21:58 Dose:  200 mls/hr


Sodium Chloride (Normal Saline)  1,000 mls @ 999 mls/hr IV ONETIME ONE


   Stop: 01/07/20 22:16


   Last Admin: 01/07/20 21:39 Dose:  999 mls/hr


Sodium Chloride (Normal Saline) Confirm Administered Dose 1,000 mls @ as 

directed .ROUTE .STK-MED ONE


   Stop: 01/07/20 21:34


   Last Admin: 01/07/20 21:38 Dose:  Not Given


Lactated Ringer's (Ringers, Lactated)  1,000 mls @ 25 mls/hr IV ASDIRECTED [facility]


   Last Admin: 01/09/20 12:31 Dose:  25 mls/hr


Ceftriaxone Sodium 1 gm/ (Sodium Chloride)  100 mls @ 200 mls/hr IV Q24H [facility]


   Last Admin: 01/08/20 20:39 Dose:  200 mls/hr


Piperacillin Sod/Tazobactam (Sod 4.5 gm/ Sodium Chloride)  100 mls @ 200 mls/hr 

IV ONETIME ONE


   Stop: 01/09/20 00:14


   Last Admin: 01/09/20 00:12 Dose:  200 mls/hr


Ketorolac Tromethamine (Toradol)  15 mg IV Q6H PRN


   PRN Reason: Pain (moderate 4-6)


   Last Admin: 01/08/20 03:41 Dose:  15 mg


Tamsulosin HCl (Flomax)  0.4 mg PO ONETIME ONE


   Stop: 01/09/20 17:06


   Last Admin: 01/09/20 17:42 Dose:  0.4 mg











- Exam


Quality Assessment: Reports: DVT Prophylaxis


General: Reports: Alert, Oriented, Cooperative, No Acute Distress


HEENT: Reports: Pupils Equal, Pupils Reactive, Mucous Membr. Moist/Pink


Neck: Reports: Supple, Trachea Midline


Lungs: Reports: Clear to Auscultation, Normal Respiratory Effort


Cardiovascular: Reports: Regular Rate, Regular Rhythm


GI/Abdominal Exam: Normal Bowel Sounds, Soft, Non-Tender, No Distention, No 

Abnormal Bruit


 (Male) Exam: Deferred


Rectal (Males) Exam: Deferred


Back Exam: Reports: Normal Inspection, Full Range of Motion, CVA Tenderness (R)

.  Denies: CVA Tenderness (L)


Extremities: Normal Inspection, Normal Range of Motion, Non-Tender, No Pedal 

Edema, Normal Capillary Refill


Skin: Reports: Warm, Dry, Intact


Neurological: Reports: No New Focal Deficit


Psy/Mental Status: Reports: Alert, Normal Affect, Normal Mood

## 2020-09-10 NOTE — CT
Head CT

 

Technique: Multiple axial sections through the brain were obtained.  

Intravenous contrast was not utilized.

 

Findings: Ventricles along with basal cisterns and sulci over the 

convexities are moderately prominent.  Diffuse diminished density is 

noted within the periventricular and subcortical white matter 

compatible with small vessel ischemic demyelination change.

 

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  This mass has transverse dimension of 

3.5 cm with AP dimension of about 2.2 cm.

 

Aneurysm is noted at the junction of the carotid and right middle 

cerebral artery measuring approximately 1.3 cm.  Second aneurysm is 

seen in a similar location on the left side measuring 1.3 cm as well. 

 No evidence of intracranial hemorrhage is seen.

 

Bone window settings were reviewed which shows no acute calvarial 

finding.  Visualized mastoid sinuses and paranasal sinuses show 

nothing acute.

 

Impression:

1.  Pituitary mass with measurements as noted above.

2.  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

 cerebral arteries on both sides with both aneurysms measuring 1.3 cm.

3.  Senescent change as noted above.

 

Diagnostic code #9

 

This report was dictated in MDT

## 2020-09-10 NOTE — EDM.PDOC
ED HPI GENERAL MEDICAL PROBLEM





- General


Chief Complaint: Eye Problems


Stated Complaint: [facility]


Time Seen by Provider: 09/10/20 17:25


Source of Information: Reports: Patient


History Limitations: Reports: No Limitations





- History of Present Illness


INITIAL COMMENTS - FREE TEXT/NARRATIVE: 





The patient presents for left eye pain and double vision.  He says this all sta

rted about 4 months ago.  He had pain to the eye that comes and goes and it is 

better now.  Now his left eyelid will not open and he sees double when he can 

get his eyelid open.  He has no other symptoms such as fever, chills, cough, 

headache, chest pain, shortness of breath, abdominal pain, nausea, vomiting, 

numbness or weakness.


Onset: Gradual


Duration: Week(s): (16)


Location: Reports: Other (left eye)


Quality: Reports: Sharp


Severity: Moderate


Improves with: Reports: None


Worsens with: Reports: None


Associated Symptoms: Denies: Chest Pain, Cough, Fever/Chills, Headaches, 

Nausea/Vomiting, Shortness of Breath





- Related Data


                                    Allergies











Allergy/AdvReac Type Severity Reaction Status Date / Time


 


No Known Allergies Allergy   Verified 09/10/20 17:10











Home Meds: 


                                    Home Meds





Ascorbic Acid [Vitamin C] 1,000 mg PO DAILY 11/28/18 [History]


Calcium Carbonate [Calcium] 500 mg PO DAILY 11/28/18 [History]


Cholecalciferol (Vitamin D3) [Vitamin D3] 1,000 unit PO DAILY 11/28/18 [History]


Vitamin B Complex 1 cap PO DAILY 11/28/18 [History]


amLODIPine Besylate [Amlodipine Besylate] 5 mg PO DAILY 11/28/18 [History]











Past Medical History


Cardiovascular History: Reports: Hypertension, Other (See Below)


Other Cardiovascular History: abdominal aortic aneurysm


Respiratory History: Reports: COPD


Gastrointestinal History: Reports: Chronic Constipation, Other (See Below)


Other Gastrointestinal History: For past 1.5 years


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


Musculoskeletal History: Reports: Back Pain, Chronic


Neurological History: Reports: None


Psychiatric History: Reports: None


Endocrine/Metabolic History: Reports: None


Hematologic History: Reports: None


Immunologic History: Reports: None


Oncologic (Cancer) History: Reports: None


Dermatologic History: Reports: None





- Infectious Disease History


Infectious Disease History: Reports: None





- Past Surgical History


Head Surgeries/Procedures: Reports: None


HEENT Surgical History: Reports: Cataract Surgery, Other (See Below)


Other HEENT Surgeries/Procedures: Bilaterally


Cardiovascular Surgical History: Reports: None


Respiratory Surgical History: Reports: None


GI Surgical History: Reports: None


Male  Surgical History: Reports: None


Musculoskeletal Surgical History: Reports: Other (See Below)


Other Musculoskeletal Surgeries/Procedures:: laminectomy





Social & Family History





- Family History


Family Medical History: Noncontributory





- Tobacco Use


Smoking Status *Q: Current Every Day Smoker


Years of Tobacco use: 70


Packs/Tins Daily: 1





- Caffeine Use


Caffeine Use: Reports: Coffee, Tea





- Recreational Drug Use


Recreational Drug Use: No





- Living Situation & Occupation


Living situation: Reports: Single


Occupation: Retired





ED ROS GENERAL





- Review of Systems


Review Of Systems: See Below


Constitutional: Reports: No Symptoms


HEENT: Reports: Other (Left eye pain)


Respiratory: Reports: No Symptoms


Cardiovascular: Reports: No Symptoms


Endocrine: Reports: No Symptoms


GI/Abdominal: Reports: No Symptoms


: Reports: No Symptoms


Musculoskeletal: Reports: No Symptoms





ED EXAM GENERAL W FULL EYE





- Physical Exam


Exam: See Below


Exam Limited By: No Limitations


General Appearance: Alert, No Apparent Distress


Eye Exam: Left Eye: Abnormal EOM (cannot look toward his nose), Abnormal Pupil 

(dilated and nonreactive), Other (ptosis of upper eyelid)


Conjunctiva & Sclera: Bilateral: Normal Appearance


Pupillary Size: Right: 2 mm, Left: 7 mm


Pupillary Reaction: Right: Brisk, Left: Absent


Anterior Chamber: Left: Normal Appearance


Ears: Normal External Exam


Nose: Normal Inspection


Head: Atraumatic, Normocephalic


Neck: Normal Inspection


Respiratory/Chest: No Respiratory Distress, Lungs Clear, Normal Breath Sounds


Cardiovascular: Regular Rate, Rhythm, No Edema, No Murmur


GI/Abdominal: Soft, Non-Tender, No Organomegaly, No Mass


Back Exam: Normal Inspection





Course





- Vital Signs


Last Recorded V/S: 


                                Last Vital Signs











Temp  97.4 F   09/10/20 17:14


 


Pulse  65   09/10/20 17:14


 


Resp  16   09/10/20 17:14


 


BP  129/94 H  09/10/20 17:14


 


Pulse Ox  97   09/10/20 17:14














- Re-Assessments/Exams


Free Text/Narrative Re-Assessment/Exam: 





09/10/20 18:54


I ordered a CT of his head.


09/10/20 19:07


The CT shows pituitary mass that is 3.5cm by 2.2cm.  Aneurysm at the junction of

 the internal carotid and middle cerebral arteries on both sides with both 

aneurysms measuring 1.3cm.  Senescent changes.





I will discharge him home and [facility].  He will need 

urgent follow up.





Departure





- Departure


Time of Disposition: 19:10


Disposition: Home, Self-Care 01


Condition: Good


Clinical Impression: 


 Pituitary mass, Diplopia, Cerebral arterial aneurysm





Ptosis


Qualifiers:


 Laterality: left Qualified Code(s): H02.402 - Unspecified ptosis of left eyelid








- Discharge Information


*PRESCRIPTION DRUG MONITORING PROGRAM REVIEWED*: Not Applicable


*COPY OF PRESCRIPTION DRUG MONITORING REPORT IN PATIENT MEG: Not Applicable


Referrals: 


Melanie Cao MD [Primary Care Provider] - 1 Day


Forms:  ED Department Discharge


Additional Instructions: 


Take your medication as prescribed.  Follow up with Dr Cao within a couple of

days.





Sepsis Event Note (ED)





- Evaluation


Sepsis Screening Result: No Definite Risk





- Focused Exam


Vital Signs: 


                                   Vital Signs











  Temp Pulse Resp BP Pulse Ox


 


 09/10/20 17:14  97.4 F  65  16  129/94 H  97

## 2020-09-11 NOTE — EDM.PDOC
ED HPI GENERAL MEDICAL PROBLEM





- General


Chief Complaint: Neurological Problem


Stated Complaint: NEUROLOGICAL PROBLEMS


Time Seen by Provider: 09/11/20 17:00


Source of Information: Reports: Patient


History Limitations: Reports: No Limitations





- History of Present Illness


INITIAL COMMENTS - FREE TEXT/NARRATIVE: 





The patient returns to the [facility] for transfer to [place] in [place].  He was 

seen her last night for 4 months of left eye pain, ptosis, and double vision.  

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 

artery.  He has no headache now.  He still has the ptosis and double vision in 

the left eye.  [facility] 

[facility]urosurgeon and then [facility] Neurosurgeon and they want him in [facility].  His daughter brought him back up here.


Onset: Gradual


Duration: Week(s):


Location: Reports: Other (left eye)


Quality: Reports: Ache


Severity: Mild


Improves with: Reports: None


Worsens with: Reports: None


Associated Symptoms: Reports: No Other Symptoms





- Related Data


                                    Allergies











Allergy/AdvReac Type Severity Reaction Status Date / Time


 


No Known Allergies Allergy   Verified 09/10/20 17:10











Home Meds: 


                                    Home Meds





Ascorbic Acid [Vitamin C] 1,000 mg PO DAILY 11/28/18 [History]


Calcium Carbonate [Calcium] 500 mg PO DAILY 11/28/18 [History]


Cholecalciferol (Vitamin D3) [Vitamin D3] 1,000 unit PO DAILY 11/28/18 [History]


Vitamin B Complex 1 cap PO DAILY 11/28/18 [History]


amLODIPine Besylate [Amlodipine Besylate] 5 mg PO DAILY 11/28/18 [History]











Past Medical History


Cardiovascular History: Reports: Hypertension, Other (See Below)


Other Cardiovascular History: abdominal aortic aneurysm


Respiratory History: Reports: COPD


Gastrointestinal History: Reports: Chronic Constipation, Other (See Below)


Other Gastrointestinal History: For past 1.5 years


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


Musculoskeletal History: Reports: Back Pain, Chronic


Neurological History: Reports: None


Psychiatric History: Reports: None


Endocrine/Metabolic History: Reports: None


Hematologic History: Reports: None


Immunologic History: Reports: None


Oncologic (Cancer) History: Reports: None


Dermatologic History: Reports: None





- Infectious Disease History


Infectious Disease History: Reports: None





- Past Surgical History


Head Surgeries/Procedures: Reports: None


HEENT Surgical History: Reports: Cataract Surgery, Other (See Below)


Other HEENT Surgeries/Procedures: Bilaterally


Cardiovascular Surgical History: Reports: None


Respiratory Surgical History: Reports: None


GI Surgical History: Reports: None


Male  Surgical History: Reports: None


Musculoskeletal Surgical History: Reports: Other (See Below)


Other Musculoskeletal Surgeries/Procedures:: laminectomy





Social & Family History





- Family History


Family Medical History: Noncontributory





- Caffeine Use


Caffeine Use: Reports: Coffee, Tea





- Living Situation & Occupation


Living situation: Reports: Single


Occupation: Retired





ED ROS GENERAL





- Review of Systems


Review Of Systems: See Below


Constitutional: Reports: No Symptoms


HEENT: Reports: Other (eye pain and double vision)


Respiratory: Reports: No Symptoms


Cardiovascular: Reports: No Symptoms


Endocrine: Reports: No Symptoms


GI/Abdominal: Reports: No Symptoms





ED EXAM, NEURO





- Physical Exam


Exam: See Below


Exam Limited By: No Limitations


General Appearance: Alert, No Apparent Distress


Eye Exam: Left Eye: Abnormal EOM (left eye cannot look at his nose), Other 

(Ptosis and dilated fixed pupil)


Ears: Normal External Exam


Nose: Normal Inspection


Throat/Mouth: Normal Inspection


Head Exam: Atraumatic, Normocephalic


Neck: Normal Inspection, Supple, Non-Tender


Respiratory/Chest: No Respiratory Distress, Lungs Clear, Normal Breath Sounds


Cardiovascular: Regular Rate, Rhythm, No Edema, No Murmur


GI/Abdominal: Soft, Non-Tender, No Organomegaly, No Mass


Neurological: Alert, No Motor/Sensory Deficits, Oriented x 3





Course





- Vital Signs


Last Recorded V/S: 





                                Last Vital Signs











Temp  97.2 F   09/11/20 17:03


 


Pulse  90   09/11/20 17:03


 


Resp  20   09/11/20 17:03


 


BP  103/70   09/11/20 17:03


 


Pulse Ox  98   09/11/20 17:03














- Orders/Labs/Meds


Orders: 





                               Active Orders 24 hr











 Category Date Time Status


 


 Peripheral IV Care [RC] .AS DIRECTED Care  09/11/20 17:11 Active


 


 CBC WITH AUTO DIFF [HEME] Stat Lab  09/11/20 17:11 Ordered


 


 CMP [COMPREHENSIVE METABOLIC PN,CMP] [CHEM] Stat Lab  09/11/20 17:11 Ordered


 


 INR,PT,PROTHROMBIN TIME [COAG] Stat Lab  09/11/20 17:11 Ordered


 


 PTT,PARTIAL THROMBOPLSTIN TIME [COAG] Stat Lab  09/11/20 17:11 Ordered


 


 Sodium Chloride 0.9% [Saline Flush] Med  09/11/20 17:11 Active





 10 ml FLUSH ASDIRECTED PRN   


 


 Peripheral IV Insertion Adult [OM.PC] Routine Oth  09/11/20 17:11 Ordered








                                Medication Orders





Sodium Chloride (Saline Flush)  10 ml FLUSH ASDIRECTED PRN


   PRN Reason: Keep Vein Open








Meds: 





Medications











Generic Name Dose Route Start Last Admin





  Trade Name Freq  PRN Reason Stop Dose Admin


 


Sodium Chloride  10 ml  09/11/20 17:11 





  Saline Flush  FLUSH  





  ASDIRECTED PRN  





  Keep Vein Open  














- Re-Assessments/Exams


Free Text/Narrative Re-Assessment/Exam: 





09/11/20 17:17


I ordered an IV and labs.  I called [facility] in [place] and talked with Dr Hernandez 

and he accepted the patient.  [facility] flight team will be here soon to take him.





Departure





- Departure


Time of Disposition: 17:20


Disposition: Home, Self-Care 01


Condition: Good


Clinical Impression: 


 Pituitary mass, Diplopia, Cerebral arterial aneurysm





Ptosis


Qualifiers:


 Laterality: left Qualified Code(s): H02.402 - Unspecified ptosis of left eyelid








- Discharge Information


Referrals: 


Melanie Cao MD [Primary Care Provider] - 





Sepsis Event Note (ED)





- Evaluation


Sepsis Screening Result: No Definite Risk





- Focused Exam


Vital Signs: 





                                   Vital Signs











  Temp Pulse Resp BP Pulse Ox


 


 09/11/20 17:03  97.2 F  90  20  103/70  98














- My Orders


Last 24 Hours: 





My Active Orders





09/11/20 17:11


Peripheral IV Care [RC] .AS DIRECTED 


CBC WITH AUTO DIFF [HEME] Stat 


CMP [COMPREHENSIVE METABOLIC PN,CMP] [CHEM] Stat 


INR,PT,PROTHROMBIN TIME [COAG] Stat 


PTT,PARTIAL THROMBOPLSTIN TIME [COAG] Stat 


Sodium Chloride 0.9% [Saline Flush]   10 ml FLUSH ASDIRECTED PRN 


Peripheral IV Insertion Adult [OM.PC] Routine 














- Assessment/Plan


Last 24 Hours: 





My Active Orders





09/11/20 17:11


Peripheral IV Care [RC] .AS DIRECTED 


CBC WITH AUTO DIFF [HEME] Stat 


CMP [COMPREHENSIVE METABOLIC PN,CMP] [CHEM] Stat 


INR,PT,PROTHROMBIN TIME [COAG] Stat 


PTT,PARTIAL THROMBOPLSTIN TIME [COAG] Stat 


Sodium Chloride 0.9% [Saline Flush]   10 ml FLUSH ASDIRECTED PRN 


Peripheral IV Insertion Adult [OM.PC] Routine

## 2021-01-13 NOTE — EDM.PDOC
ED HPI GENERAL MEDICAL PROBLEM





- General


Chief Complaint: General


Stated Complaint: FELL YESTERDAY/HAS A LUMP ON RIGHT SIDE OF HIP AREA


Time Seen by Provider: 01/13/21 19:08


Source of Information: Reports: Patient


History Limitations: Reports: No Limitations





- History of Present Illness


INITIAL COMMENTS - FREE TEXT/NARRATIVE: 





85-year-old male brought to the ED by his daughter.  History suggest that he 

fell yesterday morning somewhere around 830 to 9:00 after getting up from his 

easy chair.  He suddenly found himself lying with his back up against the wall 

and landed on a baseboard heater injuring his left posterior hip and buttock 

area.  He states he was on the floor for about 6 hours before he managed to 

crawl to the bedroom where he has been for the most part until his daughter 

discovered him there today.  He has been urinating in his clothing not able to 

get to the bathroom and has not had to defecate.  He states there is some pain 

intermittently with voiding.  No true dysuria but more of a sharp stabbing type 

pain.  He is so weak that he could not get up and walk.  Daughter was able to 

get him up into a seated walker and get him out of the car load him up to come 

to the hospital.  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  Previous 

surgery is that of a discectomy laminectomy he believes at L4-L5 in 1970 out in 

[place].  More recently he has developed gradual severe ptosis and 

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

waldemar.  Condition is considered to be extremely rare and he was worked up by 

neurosurgeons in [facility] in [place].  They did not have any easy 

treatment plans for him.  They suggested a stent that might take some of the 

pressure off the aneurysm over period of a year but patient declined due to his 

age.  His left eye therefore stays completely closed and if he opens it he has 

double vision and blurred vision.  This contributes to his difficulties walking 

and getting around his own home.  He does live alone.  Currently is taking no 

medications.  He denies any known exposure to COVID-19 illness.


Onset: Sudden


Onset Date: 01/12/21 (He estimates he fell at home about a 30 9:00 yesterday 

morning.)


Duration: Hour(s):, Constant, Other (Neurolyse severe weakness with inability to

get up and walk on his own volition.)


Location: Reports: Pelvis (Injury to the posterior right iliac crest area.  

Dates it hurts if he touches it but if he is at rest he has no pain.)


Quality: Reports: Ache, Throbbing


Severity: Moderate


Improves with: Reports: Rest


Worsens with: Reports: Other (Not walk on his own volition.)


Context: Reports: Trauma (Fall at home).  Denies: Activity, Exercise, Lifting, 

Sick Contact, Other ( yesterday morning.)


Associated Symptoms: Reports: Cough, Loss of Appetite, Malaise, Weakness 

(Neurolyse but particular in his lower extremities.).  Denies: No Other 

Symptoms, Confusion (Nonproductive), Chest Pain, cough w sputum, Diaphoresis, 

Fever/Chills, Headaches, Nausea/Vomiting, Rash, Seizure, Shortness of Breath, 

Syncope


Treatments PTA: Reports: Other (see below) (None.)





- Related Data


                                    Allergies











Allergy/AdvReac Type Severity Reaction Status Date / Time


 


No Known Allergies Allergy   Verified 01/13/21 19:14











Home Meds: 


                                    Home Meds





. [No Known Home Meds]  01/13/21 [History]











Past Medical History


HEENT History: Reports: Impaired Vision


Cardiovascular History: Reports: Hypertension, Other (See Below)


Other Cardiovascular History: abdominal aortic aneurysm


Respiratory History: Reports: COPD


Gastrointestinal History: Reports: Chronic Constipation, Other (See Below)


Other Gastrointestinal History: For past 1.5 years


Genitourinary History: Reports: BPH, Prostate Disorder, UTI, Recurrent (Current 

urinary tract infection secondary to prostatism.)


Musculoskeletal History: Reports: Back Pain, Chronic


Neurological History: Reports: None


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

 Sept 11,2020


Psychiatric History: Reports: None


Endocrine/Metabolic History: Reports: None


Hematologic History: Reports: None


Immunologic History: Reports: None


Oncologic (Cancer) History: Reports: None


Dermatologic History: Reports: None





- Infectious Disease History


Infectious Disease History: Reports: None





- Past Surgical History


Head Surgeries/Procedures: Reports: None


HEENT Surgical History: Reports: Cataract Surgery, Other (See Below)


Other HEENT Surgeries/Procedures: Bilaterally


Cardiovascular Surgical History: Reports: None


Respiratory Surgical History: Reports: None


GI Surgical History: Reports: None


Male  Surgical History: Reports: None


Musculoskeletal Surgical History: Reports: Other (See Below)


Other Musculoskeletal Surgeries/Procedures:: laminectomy





Social & Family History





- Family History


Family Medical History: No Pertinent Family History





- Caffeine Use


Caffeine Use: Reports: Coffee, Tea





- Living Situation & Occupation


Living situation: Reports: Single


Occupation: Retired





ED ROS GENERAL





- Review of Systems


Review Of Systems: See Below


Constitutional: Reports: Malaise, Weakness, Fatigue, Decreased Appetite (Patient

 currently weighs about 150 pounds.  He used to weigh 234 pounds 2 years ago), 

Weight Loss.  Denies: Fever, Chills


HEENT: Reports: Other (Double vision left eye with dilated pupil and severe 

ptosis.  He cannot open his left eye on his own volition.  This was found to be 

secondary to bilateral 1.3 cm aneurysms of the cerebellar vessels where they 

connect to the middle cerebral arteries.)


Respiratory: Reports: Shortness of Breath, Cough.  Denies: Wheezing, Pleuritic 

Chest Pain, Sputum, Hemoptysis, Other (Unproductive)


Cardiovascular: Reports: Dyspnea on Exertion.  Denies: Chest Pain, Blood 

Pressure Problem, Claudication, Edema, Lightheadedness, Orthopnea


Endocrine: Reports: Fatigue


GI/Abdominal: Reports: Constipation, Decreased Appetite.  Denies: Nausea 

(Patient problems with constipation), Vomiting


: Reports: Frequency, Other (He describes some sharp stabbing pain or pinching

 type pain with voiding at times but it comes and goes.  No true dysuria.  He 

has not seen any blood in the urine or foul smell.  He states he has had chronic

 prostatitis for many years)


Musculoskeletal: Reports: Neck Pain, Shoulder Pain, Back Pain, Other (Previous 

open reduction internal fixation right ankle with plates and screws in the 

fibula.)


Skin: Reports: Bruising (Is fairly easily.)


Neurological: Reports: Difficulty Walking (In his lower extremities), Weakness. 

 Denies: Confusion, Dizziness, Headache, Syncope, Trouble Speaking ( has a 

walker at home), Change in Speech, Gait Disturbance


Psychiatric: Reports: No Symptoms


Hematologic/Lymphatic: Reports: No Symptoms


Immunologic: Reports: No Symptoms





ED EXAM, GENERAL





- Physical Exam


Exam: See Below


Exam Limited By: No Limitations


General Appearance: Alert, WD/WN, Mild Distress, Other (Affect.  Answers all 

questions appropriately.  Temperature is 36.4 with a heart rate of 86 in sinus 

respiratory is 18 with O2 sats of 98% room air /90)


Eye Exam: Left Eye: Abnormal EOM, Periorbital Changes (He has ptosis of the left

 upper eyelid and cannot open his left eye on his own volition.  This is been 

for the last 2 to 3 months.), PERRL (Left pupil is 8 mm and dilated.  Right is 

normal at 5 mm.), Vision Changes (Has developed ptosis and double vision left 

visual field over the last 3 months perhaps even a bit longer)


Throat/Mouth: Other (Tongue is very dry and coated.  He appears volume depleted)


Head: Atraumatic, Normocephalic, Other (Outward signs of head or neck trauma.)


Neck: Normal Inspection, Limited Range of Motion, Tender Lateral.  No: Full 

Range of Motion, Carotid Bruit, Lymphadenopathy (L) (Tender lateral but he 

states that his norm.), Lymphadenopathy (R)


Respiratory/Chest: No Respiratory Distress, Lungs Clear, Normal Breath Sounds, 

No Accessory Muscle Use, Other (Injury to the posterior chest wall.)


Cardiovascular: Regular Rate, Rhythm, No Edema, No Gallop, No Murmur, No Rub.  

No: Normal Peripheral Pulses


Peripheral Pulses: 1+: Posterior Tibial (L), Posterior Tibial (R), Dorsalis 

Pedis (L), Dorsalis Pedis (R), 2+: Carotid (L), Carotid (R)


GI/Abdominal: Normal Bowel Sounds, Soft, Non-Tender, No Organomegaly, No Mass, 

Pelvis Stable


Back Exam: Other (No overt signs of trauma to the thoracic or lumbar spine.  He 

has some tenderness on palpation over the right posterior iliac crest and 

buttock area no pain over the true sacrum.  No spinous process deformities of 

the lumbar spine)


Extremities: Other (Previous surgical scars right lateral ankle.  Mild osteoa

rthritic changes both knees.)


Psychiatric: Normal Affect, Normal Mood, Other


Skin Exam: Warm (Tanker his affect), Dry, Intact, Normal Color, No Rash


  ** #1 Interpretation


EKG Date: 01/13/21


Time: 19:53


Rhythm: Other (Regular rhythm of undetermined origin.  P waves are not easily 

discernible baseline irregularity particularly noted throughout the limb leads.)


Rate (Beats/Min): 88


Axis: Normal


P-Wave: Absent


QRS: Other (Decreased voltage throughout the limb leads.  Q waves V1 V2 and near

 Q waves V3 compared with old anteroseptal myocardial infarction)


ST-T: Other (Slight irregularity throughout the limb leads limits ability to 

interpret this ECG.)


QT: Prolonged (Mildly prolonged)


EKG Interpretation Comments: 





Abnormal ECG





Course





- Vital Signs


Last Recorded V/S: 


                                Last Vital Signs











Temp  36.4 C   01/13/21 19:11


 


Pulse  86   01/13/21 19:11


 


Resp  18   01/13/21 19:11


 


BP  143/90 H  01/13/21 19:11


 


Pulse Ox  98   01/13/21 19:11














- Orders/Labs/Meds


Orders: 


                               Active Orders 24 hr











 Category Date Time Status


 


 EKG Documentation Completion [RC] STAT Care  01/13/21 19:43 Active


 


 Chest 1V Frontal [CR] Stat Exams  01/13/21 20:47 Taken


 


 Pelvis 1V or 2V [CR] Stat Exams  01/13/21 19:30 Taken


 


 CULTURE URINE [RM] Stat Lab  01/13/21 19:43 Received


 


 Dextrose 5%-0.9% NaCl [Dextrose 5%-Normal Saline] 1,000 Med  01/13/21 19:30 

Active





 ml   





 IV ASDIRECTED   








                                Medication Orders





Dextrose/Sodium Chloride (Dextrose 5%-Normal Saline)  1,000 mls @ 500 mls/hr IV 

ASDIRECTED CHE


   Last Admin: 01/13/21 19:59  Dose: 500 mls/hr


   Documented by: MARANDA








Labs: 


                                Laboratory Tests











  01/13/21 01/13/21 01/13/21 Range/Units





  19:44 20:00 20:00 


 


WBC   13.96 H   (4.23-9.07)  K/mm3


 


RBC   4.47 L   (4.63-6.08)  M/mm3


 


Hgb   13.9   (13.7-17.5)  gm/dl


 


Hct   42.8   (40.1-51.0)  %


 


MCV   95.7 H D   (79.0-92.2)  fl


 


MCH   31.1   (25.7-32.2)  pg


 


MCHC   32.5   (32.2-35.5)  g/dl


 


RDW Std Deviation   48.6 H   (35.1-43.9)  fL


 


Plt Count   202   (163-337)  K/mm3


 


MPV   10.0   (9.4-12.3)  fl


 


Neut % (Auto)   70.6 H   (34.0-67.9)  %


 


Lymph % (Auto)   15.8 L   (21.8-53.1)  %


 


Mono % (Auto)   12.5 H   (5.3-12.2)  %


 


Eos % (Auto)   0.6 L   (0.8-7.0)  


 


Baso % (Auto)   0.3   (0.1-1.2)  %


 


Neut # (Auto)   9.85 H   (1.78-5.38)  K/mm3


 


Lymph # (Auto)   2.21   (1.32-3.57)  K/mm3


 


Mono # (Auto)   1.74 H   (0.30-0.82)  K/mm3


 


Eos # (Auto)   0.09   (0.04-0.54)  K/mm3


 


Baso # (Auto)   0.04   (0.01-0.08)  K/mm3


 


Manual Slide Review   Abnormal smear   


 


ESR     (0-15)  mm/hr


 


PT     (9.7-12.0)  SECONDS


 


INR     


 


APTT     (21.7-31.4)  SECONDS


 


Sodium    139  (136-145)  mEq/L


 


Potassium    4.4  (3.5-5.1)  mEq/L


 


Chloride    105  ()  mEq/L


 


Carbon Dioxide    25  (21-32)  mEq/L


 


Anion Gap    13.4  (5-15)  


 


BUN    43 H D  (7-18)  mg/dL


 


Creatinine    1.4 H  (0.7-1.3)  mg/dL


 


Est Cr Clr Drug Dosing    TNP  


 


Estimated GFR (MDRD)    48  (>60)  mL/min


 


BUN/Creatinine Ratio    30.7 H  (14-18)  


 


Glucose    110  ()  mg/dL


 


Lactic Acid     (0.4-2.0)  mmol/L


 


Calcium    9.7  (8.5-10.1)  mg/dL


 


Magnesium    2.3  (1.8-2.4)  mg/dl


 


Total Bilirubin    0.6  (0.2-1.0)  mg/dL


 


AST    18  (15-37)  U/L


 


ALT    25  (16-63)  U/L


 


Alkaline Phosphatase    64  ()  U/L


 


Creatine Kinase    249  ()  U/L


 


Troponin I     (0.00-0.056)  ng/mL


 


C-Reactive Protein    8.7 H*  (<1.0)  mg/dL


 


NT-Pro-B Natriuret Pep     (0-450)  pg/mL


 


Total Protein    7.4  (6.4-8.2)  g/dl


 


Albumin    3.2 L  (3.4-5.0)  g/dl


 


Globulin    4.2  gm/dL


 


Albumin/Globulin Ratio    0.8 L  (1-2)  


 


Urine Color  Yellow    (Yellow)  


 


Urine Appearance  Cloudy H    (Clear)  


 


Urine pH  6.0    (5.0-8.0)  


 


Ur Specific Gravity  1.020    (1.005-1.030)  


 


Urine Protein  2+ H    (Negative)  


 


Urine Glucose (UA)  Negative    (Negative)  


 


Urine Ketones  Negative    (Negative)  


 


Urine Occult Blood  2+ H    (Negative)  


 


Urine Nitrite  Positive H    (Negative)  


 


Urine Bilirubin  Negative    (Negative)  


 


Urine Urobilinogen  0.2    (0.2-1.0)  


 


Ur Leukocyte Esterase  3+ H    (Negative)  


 


Urine RBC  10-20 H    (0-5)  /hpf


 


Urine WBC  >100 H    (0-5)  /hpf


 


Ur Squamous Epith Cells  0-5    (0-5)  /hpf


 


Urine Bacteria  Many H    (FEW)  /hpf


 


Urine Mucus  Few    (FEW)  /hpf


 


Ketones     (0.0-0.3)  mM


 


SARS-CoV-2 RNA (MADDIE)     (NEGATIVE)  














  01/13/21 01/13/21 01/13/21 Range/Units





  20:00 20:00 20:00 


 


WBC     (4.23-9.07)  K/mm3


 


RBC     (4.63-6.08)  M/mm3


 


Hgb     (13.7-17.5)  gm/dl


 


Hct     (40.1-51.0)  %


 


MCV     (79.0-92.2)  fl


 


MCH     (25.7-32.2)  pg


 


MCHC     (32.2-35.5)  g/dl


 


RDW Std Deviation     (35.1-43.9)  fL


 


Plt Count     (163-337)  K/mm3


 


MPV     (9.4-12.3)  fl


 


Neut % (Auto)     (34.0-67.9)  %


 


Lymph % (Auto)     (21.8-53.1)  %


 


Mono % (Auto)     (5.3-12.2)  %


 


Eos % (Auto)     (0.8-7.0)  


 


Baso % (Auto)     (0.1-1.2)  %


 


Neut # (Auto)     (1.78-5.38)  K/mm3


 


Lymph # (Auto)     (1.32-3.57)  K/mm3


 


Mono # (Auto)     (0.30-0.82)  K/mm3


 


Eos # (Auto)     (0.04-0.54)  K/mm3


 


Baso # (Auto)     (0.01-0.08)  K/mm3


 


Manual Slide Review     


 


ESR    31 H  (0-15)  mm/hr


 


PT     (9.7-12.0)  SECONDS


 


INR     


 


APTT     (21.7-31.4)  SECONDS


 


Sodium     (136-145)  mEq/L


 


Potassium     (3.5-5.1)  mEq/L


 


Chloride     ()  mEq/L


 


Carbon Dioxide     (21-32)  mEq/L


 


Anion Gap     (5-15)  


 


BUN     (7-18)  mg/dL


 


Creatinine     (0.7-1.3)  mg/dL


 


Est Cr Clr Drug Dosing     


 


Estimated GFR (MDRD)     (>60)  mL/min


 


BUN/Creatinine Ratio     (14-18)  


 


Glucose     ()  mg/dL


 


Lactic Acid     (0.4-2.0)  mmol/L


 


Calcium     (8.5-10.1)  mg/dL


 


Magnesium     (1.8-2.4)  mg/dl


 


Total Bilirubin     (0.2-1.0)  mg/dL


 


AST     (15-37)  U/L


 


ALT     (16-63)  U/L


 


Alkaline Phosphatase     ()  U/L


 


Creatine Kinase     ()  U/L


 


Troponin I   < 0.017   (0.00-0.056)  ng/mL


 


C-Reactive Protein     (<1.0)  mg/dL


 


NT-Pro-B Natriuret Pep  1036 H    (0-450)  pg/mL


 


Total Protein     (6.4-8.2)  g/dl


 


Albumin     (3.4-5.0)  g/dl


 


Globulin     gm/dL


 


Albumin/Globulin Ratio     (1-2)  


 


Urine Color     (Yellow)  


 


Urine Appearance     (Clear)  


 


Urine pH     (5.0-8.0)  


 


Ur Specific Gravity     (1.005-1.030)  


 


Urine Protein     (Negative)  


 


Urine Glucose (UA)     (Negative)  


 


Urine Ketones     (Negative)  


 


Urine Occult Blood     (Negative)  


 


Urine Nitrite     (Negative)  


 


Urine Bilirubin     (Negative)  


 


Urine Urobilinogen     (0.2-1.0)  


 


Ur Leukocyte Esterase     (Negative)  


 


Urine RBC     (0-5)  /hpf


 


Urine WBC     (0-5)  /hpf


 


Ur Squamous Epith Cells     (0-5)  /hpf


 


Urine Bacteria     (FEW)  /hpf


 


Urine Mucus     (FEW)  /hpf


 


Ketones     (0.0-0.3)  mM


 


SARS-CoV-2 RNA (MADDIE)     (NEGATIVE)  














  01/13/21 01/13/21 01/13/21 Range/Units





  20:00 20:00 20:00 


 


WBC     (4.23-9.07)  K/mm3


 


RBC     (4.63-6.08)  M/mm3


 


Hgb     (13.7-17.5)  gm/dl


 


Hct     (40.1-51.0)  %


 


MCV     (79.0-92.2)  fl


 


MCH     (25.7-32.2)  pg


 


MCHC     (32.2-35.5)  g/dl


 


RDW Std Deviation     (35.1-43.9)  fL


 


Plt Count     (163-337)  K/mm3


 


MPV     (9.4-12.3)  fl


 


Neut % (Auto)     (34.0-67.9)  %


 


Lymph % (Auto)     (21.8-53.1)  %


 


Mono % (Auto)     (5.3-12.2)  %


 


Eos % (Auto)     (0.8-7.0)  


 


Baso % (Auto)     (0.1-1.2)  %


 


Neut # (Auto)     (1.78-5.38)  K/mm3


 


Lymph # (Auto)     (1.32-3.57)  K/mm3


 


Mono # (Auto)     (0.30-0.82)  K/mm3


 


Eos # (Auto)     (0.04-0.54)  K/mm3


 


Baso # (Auto)     (0.01-0.08)  K/mm3


 


Manual Slide Review     


 


ESR     (0-15)  mm/hr


 


PT  11.9    (9.7-12.0)  SECONDS


 


INR  1.11    


 


APTT  32.7 H    (21.7-31.4)  SECONDS


 


Sodium     (136-145)  mEq/L


 


Potassium     (3.5-5.1)  mEq/L


 


Chloride     ()  mEq/L


 


Carbon Dioxide     (21-32)  mEq/L


 


Anion Gap     (5-15)  


 


BUN     (7-18)  mg/dL


 


Creatinine     (0.7-1.3)  mg/dL


 


Est Cr Clr Drug Dosing     


 


Estimated GFR (MDRD)     (>60)  mL/min


 


BUN/Creatinine Ratio     (14-18)  


 


Glucose     ()  mg/dL


 


Lactic Acid   1.3   (0.4-2.0)  mmol/L


 


Calcium     (8.5-10.1)  mg/dL


 


Magnesium     (1.8-2.4)  mg/dl


 


Total Bilirubin     (0.2-1.0)  mg/dL


 


AST     (15-37)  U/L


 


ALT     (16-63)  U/L


 


Alkaline Phosphatase     ()  U/L


 


Creatine Kinase     ()  U/L


 


Troponin I     (0.00-0.056)  ng/mL


 


C-Reactive Protein     (<1.0)  mg/dL


 


NT-Pro-B Natriuret Pep     (0-450)  pg/mL


 


Total Protein     (6.4-8.2)  g/dl


 


Albumin     (3.4-5.0)  g/dl


 


Globulin     gm/dL


 


Albumin/Globulin Ratio     (1-2)  


 


Urine Color     (Yellow)  


 


Urine Appearance     (Clear)  


 


Urine pH     (5.0-8.0)  


 


Ur Specific Gravity     (1.005-1.030)  


 


Urine Protein     (Negative)  


 


Urine Glucose (UA)     (Negative)  


 


Urine Ketones     (Negative)  


 


Urine Occult Blood     (Negative)  


 


Urine Nitrite     (Negative)  


 


Urine Bilirubin     (Negative)  


 


Urine Urobilinogen     (0.2-1.0)  


 


Ur Leukocyte Esterase     (Negative)  


 


Urine RBC     (0-5)  /hpf


 


Urine WBC     (0-5)  /hpf


 


Ur Squamous Epith Cells     (0-5)  /hpf


 


Urine Bacteria     (FEW)  /hpf


 


Urine Mucus     (FEW)  /hpf


 


Ketones    0.2  (0.0-0.3)  mM


 


SARS-CoV-2 RNA (MADDIE)     (NEGATIVE)  














  01/13/21 Range/Units





  20:51 


 


WBC   (4.23-9.07)  K/mm3


 


RBC   (4.63-6.08)  M/mm3


 


Hgb   (13.7-17.5)  gm/dl


 


Hct   (40.1-51.0)  %


 


MCV   (79.0-92.2)  fl


 


MCH   (25.7-32.2)  pg


 


MCHC   (32.2-35.5)  g/dl


 


RDW Std Deviation   (35.1-43.9)  fL


 


Plt Count   (163-337)  K/mm3


 


MPV   (9.4-12.3)  fl


 


Neut % (Auto)   (34.0-67.9)  %


 


Lymph % (Auto)   (21.8-53.1)  %


 


Mono % (Auto)   (5.3-12.2)  %


 


Eos % (Auto)   (0.8-7.0)  


 


Baso % (Auto)   (0.1-1.2)  %


 


Neut # (Auto)   (1.78-5.38)  K/mm3


 


Lymph # (Auto)   (1.32-3.57)  K/mm3


 


Mono # (Auto)   (0.30-0.82)  K/mm3


 


Eos # (Auto)   (0.04-0.54)  K/mm3


 


Baso # (Auto)   (0.01-0.08)  K/mm3


 


Manual Slide Review   


 


ESR   (0-15)  mm/hr


 


PT   (9.7-12.0)  SECONDS


 


INR   


 


APTT   (21.7-31.4)  SECONDS


 


Sodium   (136-145)  mEq/L


 


Potassium   (3.5-5.1)  mEq/L


 


Chloride   ()  mEq/L


 


Carbon Dioxide   (21-32)  mEq/L


 


Anion Gap   (5-15)  


 


BUN   (7-18)  mg/dL


 


Creatinine   (0.7-1.3)  mg/dL


 


Est Cr Clr Drug Dosing   


 


Estimated GFR (MDRD)   (>60)  mL/min


 


BUN/Creatinine Ratio   (14-18)  


 


Glucose   ()  mg/dL


 


Lactic Acid   (0.4-2.0)  mmol/L


 


Calcium   (8.5-10.1)  mg/dL


 


Magnesium   (1.8-2.4)  mg/dl


 


Total Bilirubin   (0.2-1.0)  mg/dL


 


AST   (15-37)  U/L


 


ALT   (16-63)  U/L


 


Alkaline Phosphatase   ()  U/L


 


Creatine Kinase   ()  U/L


 


Troponin I   (0.00-0.056)  ng/mL


 


C-Reactive Protein   (<1.0)  mg/dL


 


NT-Pro-B Natriuret Pep   (0-450)  pg/mL


 


Total Protein   (6.4-8.2)  g/dl


 


Albumin   (3.4-5.0)  g/dl


 


Globulin   gm/dL


 


Albumin/Globulin Ratio   (1-2)  


 


Urine Color   (Yellow)  


 


Urine Appearance   (Clear)  


 


Urine pH   (5.0-8.0)  


 


Ur Specific Gravity   (1.005-1.030)  


 


Urine Protein   (Negative)  


 


Urine Glucose (UA)   (Negative)  


 


Urine Ketones   (Negative)  


 


Urine Occult Blood   (Negative)  


 


Urine Nitrite   (Negative)  


 


Urine Bilirubin   (Negative)  


 


Urine Urobilinogen   (0.2-1.0)  


 


Ur Leukocyte Esterase   (Negative)  


 


Urine RBC   (0-5)  /hpf


 


Urine WBC   (0-5)  /hpf


 


Ur Squamous Epith Cells   (0-5)  /hpf


 


Urine Bacteria   (FEW)  /hpf


 


Urine Mucus   (FEW)  /hpf


 


Ketones   (0.0-0.3)  mM


 


SARS-CoV-2 RNA (MADDIE)  Negative  (NEGATIVE)  











Meds: 


Medications











Generic Name Dose Route Start Last Admin





  Trade Name Freq  PRN Reason Stop Dose Admin


 


Dextrose/Sodium Chloride  1,000 mls @ 500 mls/hr  01/13/21 19:30  01/13/21 19:59





  Dextrose 5%-Normal Saline  IV   500 mls/hr





  ASDIRECTED CHE   Administration














Discontinued Medications














Generic Name Dose Route Start Last Admin





  Trade Name Freq  PRN Reason Stop Dose Admin


 


Ceftriaxone Sodium 2 gm/  100 mls @ 200 mls/hr  01/13/21 20:46  01/13/21 20:57





  Sodium Chloride  IV  01/13/21 21:15  200 mls/hr





  ONETIME ONE   Administration


 


Phenazopyridine HCl  95 mg  01/14/21 21:09 





  Urinary Pain Relief  PO  01/14/21 21:10 





  ONETIME ONE  


 


Phenazopyridine HCl  95 mg  01/13/21 21:09  01/13/21 21:48





  Urinary Pain Relief  PO  01/13/21 21:10  95 mg





  ONETIME ONE   Administration














- Radiology Interpretation


Free Text/Narrative:: 


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 

company of his daughter.  The history suggest that yesterday morning he got up 

from his easy chair and next thing he knew he was lying up against the wall with

his back on against the wall on the floor.  His blood pressure is low and he may

well of suffered orthostatic hypotensive event.  It appears that he may have 

struck his posterior right buttock and iliac crest on a wallboard heater.  He 

was so weak that he could not get up from the floor and after several hours he 

crawled to the his bedroom where he got into his bed and he has been there ever 

since.  His daughter found him this afternoon around suppertime.  He been 

voiding into his pants and he did have a urinal that he could not void into his 

well.  He has not ate or drank for 2 days.  He states he by talked only hurts if

you touch it.  He has no pain at rest.  At this time patient does not feel that 

he would need to stay in hospital although I am suspect he will due to volume 

depletion.  He is complaining of some pain with voiding and a urinalysis will be

obtained.  There are no outward signs of significant trauma to his back chest 

wall or abdomen.  His tongue is very dry and coated.  Plan IV D5 normal saline 

at 500 mils per hour.  Routine labs to be collected 1 x-ray of the pelvis 

ordered.  Urinalysis ordered.  Cultures not ordered as the patient is not 

febrile at this time.  He denies any fever or chills.








- Re-Assessments/Exams


Free Text/Narrative Re-Assessment/Exam: 





01/13/21 20:34 x-ray of the pelvis reveals bones to be very osteopenic.  No 

fractures are identified within the right ilium.  He has bone-on-bone in both 

hips right slightly worse than the left.  No fractures are identified





01/13/21 20:36 White count is elevated at 13.96 with auto differential showing 

70.6% neutrophils a slight left shift.  Hemoglobin 13.9 with hematocrit of 42.8.

 MCV is elevated at 95.7.  Platelet count 202,000.  The urinalysis is cloudy 

with 2+ proteinuria 2+ occult blood positive nitrate 3+ leukocyte esterase 10-20

RBCs and greater than 100 WBCs per high-power field with many bacteria 

appreciated.  Urine culture has been ordered.





01/13/21 20:54 The smear shows 1+ anisocytosis.  1+ macrocytosis with no bands 

cells.  PT is 11.9 with an INR slightly elevated at 1.11.  I.e. mildly auto 

anticoagulated.  PTT is 32.7.  Sodium 139 with a potassium of 4.4 chloride 105 

with a bicarb of 25 anion gap is 13.4.  BUN is elevated at 43 with a creatinine 

of 1.4 and a GFR of only 48.  BUN/creatinine ratio is elevated at 30.7.  Glucose

is 110 with a lactic acid of 1.3.  Calcium 9.7 with a magnesium of 2.3 liver 

function is normal total CPK is 249.  C-reactive protein elevated 8.7 total 

protein 7.4 with an albumin fraction of 3.2.  Serum ketones are 0.2





01/13/21 21:03  I have spoken with Dr. Amaro on-call hospitalist and the 

patient will be admitted to the med surgery floor.  Covid screen is pending.  

Chest x-ray done portably reveals bilateral hyperinflated lung fields with COPD 

pattern.  Mildly tortuous thoracic aorta.  Cardiac silhouette otherwise normal 

evidence of decreased lung volume left lung base with suspect bleb.





01/13/21 21:35 Sedimentation rate is 31.  BNP is elevated at 1036.  Covid screen

not yet available





01/13/21 22:51 COVID-19 screen did come back negative.  Bridge orders have been 

written for the patient to be admitted to the hospital under the care of Dr. Amaro.








Departure





- Departure


Time of Disposition: 21:36


Disposition: Admitted As Inpatient 66


Condition: Fair


Clinical Impression: 


 Generalized muscle weakness, Unable to walk, Mild congestive heart failure, 

Volume depletion





Fall as cause of accidental injury at home as place of occurrence


Qualifiers:


 Encounter type: initial encounter Qualified Code(s): W19.XXXA - Unspecified 

fall, initial encounter





Urinary tract infection


Qualifiers:


 Urinary tract infection type: acute pyelonephritis Qualified Code(s): N10 - 

Acute pyelonephritis





Chronic renal insufficiency, stage III (moderate)


Qualifiers:


 Chronic kidney disease stage 3 subtype: stage 3a (GFR 45-59) Qualified Code(s):

N18.31 - Chronic kidney disease, stage 3a








- Discharge Information


*PRESCRIPTION DRUG MONITORING PROGRAM REVIEWED*: Not Applicable


*COPY OF PRESCRIPTION DRUG MONITORING REPORT IN PATIENT MEG: Not Applicable





Sepsis Event Note (ED)





- Evaluation


Sepsis Screening Result: No Definite Risk





- Focused Exam


Vital Signs: 


                                   Vital Signs











  Temp Pulse Resp BP Pulse Ox


 


 01/13/21 19:11  36.4 C  86  18  143/90 H  98














- My Orders


Last 24 Hours: 


My Active Orders





01/13/21 19:30


Pelvis 1V or 2V [CR] Stat 


Dextrose 5%-0.9% NaCl [Dextrose 5%-Normal Saline] 1,000 ml IV ASDIRECTED 





01/13/21 19:43


EKG Documentation Completion [RC] STAT 


CULTURE URINE [RM] Stat 





01/13/21 20:47


Chest 1V Frontal [CR] Stat 














- Assessment/Plan


Last 24 Hours: 


My Active Orders





01/13/21 19:30


Pelvis 1V or 2V [CR] Stat 


Dextrose 5%-0.9% NaCl [Dextrose 5%-Normal Saline] 1,000 ml IV ASDIRECTED 





01/13/21 19:43


EKG Documentation Completion [RC] STAT 


CULTURE URINE [RM] Stat 





01/13/21 20:47


Chest 1V Frontal [CR] Stat

## 2021-01-14 NOTE — CR
Chest: Portable view of the chest was obtained.

 

Comparison: Prior chest x-ray of 04/10/20 and 01/07/20.

 

Lungs are somewhat hyperinflated compatible with emphysematous change.

  No acute parenchymal change is seen within either lung.  Heart size 

is within normal limits for portable technique.  Tortuous thoracic 

aorta is seen.

 

Impression:

1.  Emphysematous change.

2.  Nothing acute is appreciated.

 

Diagnostic code #2

## 2021-01-14 NOTE — PCM.HP.2
H&P History of Present Illness





- General


Date of Service: 01/14/21


Admit Problem/Dx: 


                           Admission Diagnosis/Problem





Admission Diagnosis/Problem      Fall at home








Source of Information: Patient, Old Records, Provider, RN, RN Notes Reviewed


History Limitations: Reports: No Limitations





- History of Present Illness


Initial Comments - Free Text/Narative: 


This is an 85-year-old male who presented to ED on 1/13/2021 accompanied by his 

daughter after a fall the day prior.  Per the note he fell around 830 or 9:00 

after getting up out of his easy chair.  He is unsure why he fell and he reports

he suddenly found himself laying with his back up against the wall.  He reports 

he landed on a baseboard heater landing on his left posterior hip and buttocks 

area.  He reports he was on the floor for about 6 hours but then managed to 

crawl to his bedroom where he was until his daughter found him.  States he was 

forced to urinate on himself but had not defecated.  Reports intermittent pain w

ith voiding.  He describes it as a sharp stabbing pain.  He is unable to walk 

due to weakness.  He had a discectomy and laminectomy which he believes was at 

the L4-L5 level in 1970 in [place].  States he has severe ptosis and 

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 

arteries.  This is reportedly very rare and although he is seen neurosurgeons in

[facility] in [place] they have no definitive treatment plans for him.  They

suggested a stent but the patient declined.  He therefore will keep his left eye

closed at baseline, as if he opens that he has double vision and blurred vision.

 This leads to difficulty with ambulation and getting around his home.  He lives

in his own home alone.  Denies any current medications.  Denies any known COVID-

19 exposures.





In the ED temp is 36.4 Celsius.  Pulse 86.  Respirations 18.  Blood pressure 

143/90.  Pulse ox 98%.  Twelve-lead EKG obtained showing a regular rhythm of 

undetermined origin.  P waves are not easily discernible and there is a baseline

irregularity noted throughout the limb leads.  Rate is 88 bpm.  Q waves are 

noted in V1 and 2 with near Q waves in V3.  QT is mildly prolonged.  Labs are 

obtained with a WBC of 13.96.  Hemoglobin 13.9.  Hematocrit 42.8.  Platelets are

good at 202,000.  Neutrophils are elevated at 9.85.  Sodium is 139.  Potassium 

4.4.  Chloride 105.  Carbon oxide 25.  Anion gap 13.4.  BUN is 43.  Creatinine 

1.4.  GFR is 48.  Glucose is 110.  Calcium 9.7.  Magnesium 2.3.  Bilirubin 0.6. 

AST is 18, ALT 25, alkaline phosphatase 64.  CK is 249.  CRP is 8.7.  Protein is

7.4.  Albumin 3.2.  ESR is 31.  Troponin is less than 0.017.  proBNP is 1036.  

INR is 1.11.  Lactic acid is 1.3.  UA is grossly positive with cloudy urine, 2+ 

protein, 2+ occult blood, positive nitrate, 3+ leukocyte esterase, 10-20 RBCs, 

greater than 100 WBCs, and many bacteria noted.  Urine ketones are obtained and 

are 0.2.  SARS Covid 2 RNA is negative.  He started on D5 NS, Rocephin, and 

Pyridium.  Pelvic x-rays obtained showing osteopenia but no acute fractures.  

Chest x-ray shows nothing acute but hyperinflated lung fields are noted.  Bridge

orders are placed. Urine is sent for culture. 





He carries a history of hypertension, abdominal aortic aneurysm, COPD, chronic 

constipation, BPH, recurrent UTI, chronic back pain, pituitary tumor and 

aneurysm.  He is a full code.  [facility]. he is subse

quently admitted to the ICU as medical floor overflow for treatment of his UTI 

and weakness.





  ** Right Hip


Pain Score (Numeric/FACES): 4





- Related Data


Allergies/Adverse Reactions: 


                                    Allergies











Allergy/AdvReac Type Severity Reaction Status Date / Time


 


No Known Allergies Allergy   Verified 01/13/21 19:14











Home Medications: 


                                    Home Meds





. [No Known Home Meds]  01/13/21 [History]











Past Medical History


HEENT History: Reports: Impaired Vision, Other (See Below)


Other HEENT History: Bilateral aneurysms behind eyes


Cardiovascular History: Reports: Hypertension, Other (See Below)


Other Cardiovascular History: abdominal aortic aneurysm


Respiratory History: Reports: COPD


Gastrointestinal History: Reports: Chronic Constipation, Other (See Below)


Other Gastrointestinal History: For past 1.5 years


Genitourinary History: Reports: BPH, Prostate Disorder, UTI, Recurrent


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


Musculoskeletal History: Reports: Back Pain, Chronic


Neurological History: Reports: Other (See Below)


Other Neuro History: anursym in the head-to be sent to [place] Sept 11,2020


Psychiatric History: Reports: None


Endocrine/Metabolic History: Reports: None


Hematologic History: Reports: None


Immunologic History: Reports: None


Oncologic (Cancer) History: Reports: None


Dermatologic History: Reports: None





- Infectious Disease History


Infectious Disease History: Reports: Chicken Pox, Measles, Mumps





- Past Surgical History


Head Surgeries/Procedures: Reports: None


HEENT Surgical History: Reports: Cataract Surgery, Other (See Below)


Other HEENT Surgeries/Procedures: Bilaterally


Cardiovascular Surgical History: Reports: None


Respiratory Surgical History: Reports: None


GI Surgical History: Reports: None


Male  Surgical History: Reports: None


Musculoskeletal Surgical History: Reports: Other (See Below)


Other Musculoskeletal Surgeries/Procedures:: laminectomy





Social & Family History





- Family History


Family Medical History: No Pertinent Family History


Cardiac: Reports: MI


Musculoskeletal: Reports: Back pain, Chronic





- Tobacco Use


Tobacco Use Status *Q: Current Every Day Tobacco User


Years of Tobacco use: 65


Packs/Tins Daily: 2


Used Tobacco, but Quit: No


Tobacco Use Comment: pt refused smoking cessation information and nicotine patch


Second Hand Smoke Exposure: No





- Caffeine Use


Caffeine Use: Reports: Tea





- Alcohol Use


Days Per Week of Alcohol Use: 7


Number of Drinks Per Day: 1


Total Drinks Per Week: 7





- Recreational Drug Use


Recreational Drug Use: No





- Living Situation & Occupation


Living situation: Reports: Single


Occupation: Retired





H&P Review of Systems





- Review of Systems:


Review Of Systems: See Below


General: Reports: Malaise, Weakness, Fatigue, Decreased Appetite.  Denies: 

Fever, Chills


HEENT: Reports: Other (Unable to open left eye - baseline).  Denies: Headaches, 

Sore Throat


Pulmonary: Reports: No Symptoms.  Denies: Shortness of Breath, Wheezing, P

leuritic Chest Pain, Cough, Sputum


Cardiovascular: Reports: Dyspnea on Exertion.  Denies: Chest Pain, Palpitations,

 Edema


Gastrointestinal: Reports: Constipation.  Denies: Abdominal Pain, Diarrhea, 

Nausea, Vomiting


Genitourinary: Reports: Frequency.  Denies: Pain


Musculoskeletal: Reports: Joint Pain (right hip 2/2 fall )


Skin: Reports: No Symptoms.  Denies: Cyanosis


Psychiatric: Reports: No Symptoms.  Denies: Confusion


Neurological: Reports: Pre-Existing Deficit (Utilizes walker at home), 

Difficulty Walking, Weakness, Gait Disturbance.  Denies: Confusion, Dizziness, 

Headache, Numbness, Seizure, Syncope, Tingling


Hematologic/Lymphatic: Reports: No Symptoms


Immunologic: Reports: No Symptoms





Exam





- Exam


Exam: See Below





- Vital Signs


Vital Signs: 


                                Last Vital Signs











Temp  98.2 F   01/14/21 03:15


 


Pulse  88   01/14/21 03:15


 


Resp  18   01/14/21 03:15


 


BP  135/78   01/14/21 03:15


 


Pulse Ox  96   01/14/21 03:15











Weight: 170 lb 3.2 oz





- Exam


Quality Assessment: DVT Prophylaxis.  No: Supplemental Oxygen


General: Alert, Oriented, Cooperative.  No: Mild Distress


HEENT: Conjunctiva Clear, EACs Clear, Mucosa Moist & Pink, Posterior Pharynx Abraham

ar, Other (Abnormal EOM of left eye. Left eye abnormally dilated with chronic 

ptosis and diplopia. Has been ongoing for several months and has been seeing 

neurosurgery at [facility]. 2/2 bilateral aneurysms at junction of middle 

cerebral and cerebellar arteries. )


Lungs: Clear to Auscultation, Normal Respiratory Effort


Cardiovascular: Regular Rate, Regular Rhythm


GI/Abdominal Exam: Normal Bowel Sounds, Soft, Non-Tender, No Distention


 (Male) Exam: Deferred


Rectal (Males) Exam: Deferred


Back Exam: Normal Inspection, Full Range of Motion.  No: CVA Tenderness (L), CVA

 Tenderness (R)


Extremities: Normal Inspection, Normal Range of Motion, No Pedal Edema, Other 

(Pain to palpation of right hip)


Skin: Warm, Dry, Intact


Neurological: Cranial Nerves Intact (Grossly )


Neuro Extensive - Mental Status: Alert, Oriented x3, Normal Mood/Affect


Psychiatric: Alert, Normal Affect, Normal Mood





- Patient Data


Lab Results Last 24 hrs: 


                         Laboratory Results - last 24 hr











  01/13/21 01/13/21 01/13/21 Range/Units





  19:44 20:00 20:00 


 


WBC   13.96 H   (4.23-9.07)  K/mm3


 


RBC   4.47 L   (4.63-6.08)  M/mm3


 


Hgb   13.9   (13.7-17.5)  gm/dl


 


Hct   42.8   (40.1-51.0)  %


 


MCV   95.7 H D   (79.0-92.2)  fl


 


MCH   31.1   (25.7-32.2)  pg


 


MCHC   32.5   (32.2-35.5)  g/dl


 


RDW Std Deviation   48.6 H   (35.1-43.9)  fL


 


Plt Count   202   (163-337)  K/mm3


 


MPV   10.0   (9.4-12.3)  fl


 


Neut % (Auto)   70.6 H   (34.0-67.9)  %


 


Lymph % (Auto)   15.8 L   (21.8-53.1)  %


 


Mono % (Auto)   12.5 H   (5.3-12.2)  %


 


Eos % (Auto)   0.6 L   (0.8-7.0)  


 


Baso % (Auto)   0.3   (0.1-1.2)  %


 


Neut # (Auto)   9.85 H   (1.78-5.38)  K/mm3


 


Lymph # (Auto)   2.21   (1.32-3.57)  K/mm3


 


Mono # (Auto)   1.74 H   (0.30-0.82)  K/mm3


 


Eos # (Auto)   0.09   (0.04-0.54)  K/mm3


 


Baso # (Auto)   0.04   (0.01-0.08)  K/mm3


 


Manual Slide Review   Abnormal smear   


 


ESR     (0-15)  mm/hr


 


PT     (9.7-12.0)  SECONDS


 


INR     


 


APTT     (21.7-31.4)  SECONDS


 


Sodium    139  (136-145)  mEq/L


 


Potassium    4.4  (3.5-5.1)  mEq/L


 


Chloride    105  ()  mEq/L


 


Carbon Dioxide    25  (21-32)  mEq/L


 


Anion Gap    13.4  (5-15)  


 


BUN    43 H D  (7-18)  mg/dL


 


Creatinine    1.4 H  (0.7-1.3)  mg/dL


 


Est Cr Clr Drug Dosing    TNP  


 


Estimated GFR (MDRD)    48  (>60)  mL/min


 


BUN/Creatinine Ratio    30.7 H  (14-18)  


 


Glucose    110  ()  mg/dL


 


Lactic Acid     (0.4-2.0)  mmol/L


 


Calcium    9.7  (8.5-10.1)  mg/dL


 


Magnesium    2.3  (1.8-2.4)  mg/dl


 


Total Bilirubin    0.6  (0.2-1.0)  mg/dL


 


AST    18  (15-37)  U/L


 


ALT    25  (16-63)  U/L


 


Alkaline Phosphatase    64  ()  U/L


 


Creatine Kinase    249  ()  U/L


 


Troponin I     (0.00-0.056)  ng/mL


 


C-Reactive Protein    8.7 H*  (<1.0)  mg/dL


 


NT-Pro-B Natriuret Pep     (0-450)  pg/mL


 


Total Protein    7.4  (6.4-8.2)  g/dl


 


Albumin    3.2 L  (3.4-5.0)  g/dl


 


Globulin    4.2  gm/dL


 


Albumin/Globulin Ratio    0.8 L  (1-2)  


 


Urine Color  Yellow    (Yellow)  


 


Urine Appearance  Cloudy H    (Clear)  


 


Urine pH  6.0    (5.0-8.0)  


 


Ur Specific Gravity  1.020    (1.005-1.030)  


 


Urine Protein  2+ H    (Negative)  


 


Urine Glucose (UA)  Negative    (Negative)  


 


Urine Ketones  Negative    (Negative)  


 


Urine Occult Blood  2+ H    (Negative)  


 


Urine Nitrite  Positive H    (Negative)  


 


Urine Bilirubin  Negative    (Negative)  


 


Urine Urobilinogen  0.2    (0.2-1.0)  


 


Ur Leukocyte Esterase  3+ H    (Negative)  


 


Urine RBC  10-20 H    (0-5)  /hpf


 


Urine WBC  >100 H    (0-5)  /hpf


 


Ur Squamous Epith Cells  0-5    (0-5)  /hpf


 


Urine Bacteria  Many H    (FEW)  /hpf


 


Urine Mucus  Few    (FEW)  /hpf


 


Ketones     (0.0-0.3)  mM


 


SARS-CoV-2 RNA (MADDIE)     (NEGATIVE)  














  01/13/21 01/13/21 01/13/21 Range/Units





  20:00 20:00 20:00 


 


WBC     (4.23-9.07)  K/mm3


 


RBC     (4.63-6.08)  M/mm3


 


Hgb     (13.7-17.5)  gm/dl


 


Hct     (40.1-51.0)  %


 


MCV     (79.0-92.2)  fl


 


MCH     (25.7-32.2)  pg


 


MCHC     (32.2-35.5)  g/dl


 


RDW Std Deviation     (35.1-43.9)  fL


 


Plt Count     (163-337)  K/mm3


 


MPV     (9.4-12.3)  fl


 


Neut % (Auto)     (34.0-67.9)  %


 


Lymph % (Auto)     (21.8-53.1)  %


 


Mono % (Auto)     (5.3-12.2)  %


 


Eos % (Auto)     (0.8-7.0)  


 


Baso % (Auto)     (0.1-1.2)  %


 


Neut # (Auto)     (1.78-5.38)  K/mm3


 


Lymph # (Auto)     (1.32-3.57)  K/mm3


 


Mono # (Auto)     (0.30-0.82)  K/mm3


 


Eos # (Auto)     (0.04-0.54)  K/mm3


 


Baso # (Auto)     (0.01-0.08)  K/mm3


 


Manual Slide Review     


 


ESR    31 H  (0-15)  mm/hr


 


PT     (9.7-12.0)  SECONDS


 


INR     


 


APTT     (21.7-31.4)  SECONDS


 


Sodium     (136-145)  mEq/L


 


Potassium     (3.5-5.1)  mEq/L


 


Chloride     ()  mEq/L


 


Carbon Dioxide     (21-32)  mEq/L


 


Anion Gap     (5-15)  


 


BUN     (7-18)  mg/dL


 


Creatinine     (0.7-1.3)  mg/dL


 


Est Cr Clr Drug Dosing     


 


Estimated GFR (MDRD)     (>60)  mL/min


 


BUN/Creatinine Ratio     (14-18)  


 


Glucose     ()  mg/dL


 


Lactic Acid     (0.4-2.0)  mmol/L


 


Calcium     (8.5-10.1)  mg/dL


 


Magnesium     (1.8-2.4)  mg/dl


 


Total Bilirubin     (0.2-1.0)  mg/dL


 


AST     (15-37)  U/L


 


ALT     (16-63)  U/L


 


Alkaline Phosphatase     ()  U/L


 


Creatine Kinase     ()  U/L


 


Troponin I   < 0.017   (0.00-0.056)  ng/mL


 


C-Reactive Protein     (<1.0)  mg/dL


 


NT-Pro-B Natriuret Pep  1036 H    (0-450)  pg/mL


 


Total Protein     (6.4-8.2)  g/dl


 


Albumin     (3.4-5.0)  g/dl


 


Globulin     gm/dL


 


Albumin/Globulin Ratio     (1-2)  


 


Urine Color     (Yellow)  


 


Urine Appearance     (Clear)  


 


Urine pH     (5.0-8.0)  


 


Ur Specific Gravity     (1.005-1.030)  


 


Urine Protein     (Negative)  


 


Urine Glucose (UA)     (Negative)  


 


Urine Ketones     (Negative)  


 


Urine Occult Blood     (Negative)  


 


Urine Nitrite     (Negative)  


 


Urine Bilirubin     (Negative)  


 


Urine Urobilinogen     (0.2-1.0)  


 


Ur Leukocyte Esterase     (Negative)  


 


Urine RBC     (0-5)  /hpf


 


Urine WBC     (0-5)  /hpf


 


Ur Squamous Epith Cells     (0-5)  /hpf


 


Urine Bacteria     (FEW)  /hpf


 


Urine Mucus     (FEW)  /hpf


 


Ketones     (0.0-0.3)  mM


 


SARS-CoV-2 RNA (MADDIE)     (NEGATIVE)  














  01/13/21 01/13/21 01/13/21 Range/Units





  20:00 20:00 20:00 


 


WBC     (4.23-9.07)  K/mm3


 


RBC     (4.63-6.08)  M/mm3


 


Hgb     (13.7-17.5)  gm/dl


 


Hct     (40.1-51.0)  %


 


MCV     (79.0-92.2)  fl


 


MCH     (25.7-32.2)  pg


 


MCHC     (32.2-35.5)  g/dl


 


RDW Std Deviation     (35.1-43.9)  fL


 


Plt Count     (163-337)  K/mm3


 


MPV     (9.4-12.3)  fl


 


Neut % (Auto)     (34.0-67.9)  %


 


Lymph % (Auto)     (21.8-53.1)  %


 


Mono % (Auto)     (5.3-12.2)  %


 


Eos % (Auto)     (0.8-7.0)  


 


Baso % (Auto)     (0.1-1.2)  %


 


Neut # (Auto)     (1.78-5.38)  K/mm3


 


Lymph # (Auto)     (1.32-3.57)  K/mm3


 


Mono # (Auto)     (0.30-0.82)  K/mm3


 


Eos # (Auto)     (0.04-0.54)  K/mm3


 


Baso # (Auto)     (0.01-0.08)  K/mm3


 


Manual Slide Review     


 


ESR     (0-15)  mm/hr


 


PT  11.9    (9.7-12.0)  SECONDS


 


INR  1.11    


 


APTT  32.7 H    (21.7-31.4)  SECONDS


 


Sodium     (136-145)  mEq/L


 


Potassium     (3.5-5.1)  mEq/L


 


Chloride     ()  mEq/L


 


Carbon Dioxide     (21-32)  mEq/L


 


Anion Gap     (5-15)  


 


BUN     (7-18)  mg/dL


 


Creatinine     (0.7-1.3)  mg/dL


 


Est Cr Clr Drug Dosing     


 


Estimated GFR (MDRD)     (>60)  mL/min


 


BUN/Creatinine Ratio     (14-18)  


 


Glucose     ()  mg/dL


 


Lactic Acid   1.3   (0.4-2.0)  mmol/L


 


Calcium     (8.5-10.1)  mg/dL


 


Magnesium     (1.8-2.4)  mg/dl


 


Total Bilirubin     (0.2-1.0)  mg/dL


 


AST     (15-37)  U/L


 


ALT     (16-63)  U/L


 


Alkaline Phosphatase     ()  U/L


 


Creatine Kinase     ()  U/L


 


Troponin I     (0.00-0.056)  ng/mL


 


C-Reactive Protein     (<1.0)  mg/dL


 


NT-Pro-B Natriuret Pep     (0-450)  pg/mL


 


Total Protein     (6.4-8.2)  g/dl


 


Albumin     (3.4-5.0)  g/dl


 


Globulin     gm/dL


 


Albumin/Globulin Ratio     (1-2)  


 


Urine Color     (Yellow)  


 


Urine Appearance     (Clear)  


 


Urine pH     (5.0-8.0)  


 


Ur Specific Gravity     (1.005-1.030)  


 


Urine Protein     (Negative)  


 


Urine Glucose (UA)     (Negative)  


 


Urine Ketones     (Negative)  


 


Urine Occult Blood     (Negative)  


 


Urine Nitrite     (Negative)  


 


Urine Bilirubin     (Negative)  


 


Urine Urobilinogen     (0.2-1.0)  


 


Ur Leukocyte Esterase     (Negative)  


 


Urine RBC     (0-5)  /hpf


 


Urine WBC     (0-5)  /hpf


 


Ur Squamous Epith Cells     (0-5)  /hpf


 


Urine Bacteria     (FEW)  /hpf


 


Urine Mucus     (FEW)  /hpf


 


Ketones    0.2  (0.0-0.3)  mM


 


SARS-CoV-2 RNA (MADDIE)     (NEGATIVE)  














  01/13/21 Range/Units





  20:51 


 


WBC   (4.23-9.07)  K/mm3


 


RBC   (4.63-6.08)  M/mm3


 


Hgb   (13.7-17.5)  gm/dl


 


Hct   (40.1-51.0)  %


 


MCV   (79.0-92.2)  fl


 


MCH   (25.7-32.2)  pg


 


MCHC   (32.2-35.5)  g/dl


 


RDW Std Deviation   (35.1-43.9)  fL


 


Plt Count   (163-337)  K/mm3


 


MPV   (9.4-12.3)  fl


 


Neut % (Auto)   (34.0-67.9)  %


 


Lymph % (Auto)   (21.8-53.1)  %


 


Mono % (Auto)   (5.3-12.2)  %


 


Eos % (Auto)   (0.8-7.0)  


 


Baso % (Auto)   (0.1-1.2)  %


 


Neut # (Auto)   (1.78-5.38)  K/mm3


 


Lymph # (Auto)   (1.32-3.57)  K/mm3


 


Mono # (Auto)   (0.30-0.82)  K/mm3


 


Eos # (Auto)   (0.04-0.54)  K/mm3


 


Baso # (Auto)   (0.01-0.08)  K/mm3


 


Manual Slide Review   


 


ESR   (0-15)  mm/hr


 


PT   (9.7-12.0)  SECONDS


 


INR   


 


APTT   (21.7-31.4)  SECONDS


 


Sodium   (136-145)  mEq/L


 


Potassium   (3.5-5.1)  mEq/L


 


Chloride   ()  mEq/L


 


Carbon Dioxide   (21-32)  mEq/L


 


Anion Gap   (5-15)  


 


BUN   (7-18)  mg/dL


 


Creatinine   (0.7-1.3)  mg/dL


 


Est Cr Clr Drug Dosing   


 


Estimated GFR (MDRD)   (>60)  mL/min


 


BUN/Creatinine Ratio   (14-18)  


 


Glucose   ()  mg/dL


 


Lactic Acid   (0.4-2.0)  mmol/L


 


Calcium   (8.5-10.1)  mg/dL


 


Magnesium   (1.8-2.4)  mg/dl


 


Total Bilirubin   (0.2-1.0)  mg/dL


 


AST   (15-37)  U/L


 


ALT   (16-63)  U/L


 


Alkaline Phosphatase   ()  U/L


 


Creatine Kinase   ()  U/L


 


Troponin I   (0.00-0.056)  ng/mL


 


C-Reactive Protein   (<1.0)  mg/dL


 


NT-Pro-B Natriuret Pep   (0-450)  pg/mL


 


Total Protein   (6.4-8.2)  g/dl


 


Albumin   (3.4-5.0)  g/dl


 


Globulin   gm/dL


 


Albumin/Globulin Ratio   (1-2)  


 


Urine Color   (Yellow)  


 


Urine Appearance   (Clear)  


 


Urine pH   (5.0-8.0)  


 


Ur Specific Gravity   (1.005-1.030)  


 


Urine Protein   (Negative)  


 


Urine Glucose (UA)   (Negative)  


 


Urine Ketones   (Negative)  


 


Urine Occult Blood   (Negative)  


 


Urine Nitrite   (Negative)  


 


Urine Bilirubin   (Negative)  


 


Urine Urobilinogen   (0.2-1.0)  


 


Ur Leukocyte Esterase   (Negative)  


 


Urine RBC   (0-5)  /hpf


 


Urine WBC   (0-5)  /hpf


 


Ur Squamous Epith Cells   (0-5)  /hpf


 


Urine Bacteria   (FEW)  /hpf


 


Urine Mucus   (FEW)  /hpf


 


Ketones   (0.0-0.3)  mM


 


SARS-CoV-2 RNA (MADDIE)  Negative  (NEGATIVE)  











Result Diagrams: 


                                 01/14/21 08:25





                                 01/14/21 08:25





Sepsis Event Note





- Evaluation


Sepsis Screening Result: No Definite Risk





- Focused Exam


Vital Signs: 


                                   Vital Signs











  Temp Pulse Pulse Resp BP Pulse Ox


 


 01/14/21 03:15  98.2 F  88   18  135/78  96


 


 01/13/21 22:30  97.8 F   64  20  115/68  99














- Problem List


(1) Chronic renal insufficiency, stage III (moderate)


SNOMED Code(s): 632213617


   ICD Code: N18.30 - CHRONIC KIDNEY DISEASE, STAGE 3 UNSPECIFIED   Status: 

Chronic   Priority: Medium   Current Visit: Yes   


Qualifiers: 


   Chronic kidney disease stage 3 subtype: stage 3a (GFR 45-59)   Qualified 

Code(s): N18.31 - Chronic kidney disease, stage 3a   





(2) Fall as cause of accidental injury at home as place of occurrence


SNOMED Code(s): 85613803


   ICD Code: W19.XXXA - UNSPECIFIED FALL, INITIAL ENCOUNTER; Y92.009 - UNSP 

PLACE IN UNSP NON-INSTITUT (PRIVATE) RESIDENCE AS PLACE   Status: Acute   

Priority: High   Current Visit: Yes   


Qualifiers: 


   Encounter type: initial encounter   Qualified Code(s): W19.XXXA - Unspecified

 fall, initial encounter; Y92.009 - Unspecified place in unspecified non-

institutional (private) residence as the place of occurrence of the external 

cause   





(3) Generalized muscle weakness


SNOMED Code(s): 15795127, 63721924


   ICD Code: M62.81 - MUSCLE WEAKNESS (GENERALIZED)   Status: Acute   Priority: 

High   Current Visit: Yes   





(4) Unable to walk


SNOMED Code(s): 332265555


   ICD Code: R26.2 - DIFFICULTY IN WALKING, NOT ELSEWHERE CLASSIFIED   Status: 

Acute   Priority: High   Current Visit: Yes   





(5) Urinary tract infection


SNOMED Code(s): 78381244


   ICD Code: N39.0 - URINARY TRACT INFECTION, SITE NOT SPECIFIED   Status: Acute

   Priority: High   Current Visit: Yes   


Qualifiers: 


   Urinary tract infection type: acute cystitis 





(6) Volume depletion


SNOMED Code(s): 738444080


   ICD Code: E86.9 - VOLUME DEPLETION, UNSPECIFIED   Status: Acute   Priority: 

High   Current Visit: Yes   





(7) Cerebral arterial aneurysm


SNOMED Code(s): 873460472


   ICD Code: I67.1 - CEREBRAL ANEURYSM, NONRUPTURED   Status: Chronic   

Priority: Low   Current Visit: No   





(8) Diplopia


SNOMED Code(s): 19336389


   ICD Code: H53.2 - DIPLOPIA   Status: Chronic   Priority: Low   Current Visit:

 No   





(9) Pituitary mass


Status: Chronic   Priority: Low   Current Visit: No   





(10) AAA (abdominal aortic aneurysm)


SNOMED Code(s): 056148898


   ICD Code: I71.4 - ABDOMINAL AORTIC ANEURYSM, WITHOUT RUPTURE   Status: 

Chronic   Priority: Medium   Current Visit: No   


Qualifiers: 


   Presence of rupture: without rupture   Qualified Code(s): I71.4 - Abdominal 

aortic aneurysm, without rupture   





(11) COPD (chronic obstructive pulmonary disease)


SNOMED Code(s): 75229446


   ICD Code: J44.9 - CHRONIC OBSTRUCTIVE PULMONARY DISEASE, UNSPECIFIED   

Status: Chronic   Priority: Medium   Current Visit: No   


Qualifiers: 


   COPD type: unspecified COPD   Qualified Code(s): J44.9 - Chronic obstructive 

pulmonary disease, unspecified   





(12) Chronic back pain


SNOMED Code(s): 010170101


   ICD Code: M54.9 - DORSALGIA, UNSPECIFIED; G89.29 - OTHER CHRONIC PAIN   

Status: Chronic   Priority: Low   Current Visit: No   


Qualifiers: 


   Back pain location: back pain in unspecified location   Back pain laterality:

 unspecified   Qualified Code(s): M54.9 - Dorsalgia, unspecified; G89.29 - Other

 chronic pain   





(13) HTN (hypertension)


SNOMED Code(s): 50763109


   ICD Code: I10 - ESSENTIAL (PRIMARY) HYPERTENSION   Status: Chronic   

Priority: Medium   Current Visit: No   


Qualifiers: 


   Hypertension type: unspecified   Qualified Code(s): I10 - Essential (primary)

 hypertension   





(14) Prostate disorder


SNOMED Code(s): 48391791


   ICD Code: N42.9 - DISORDER OF PROSTATE, UNSPECIFIED   Status: Chronic   

Priority: Low   Current Visit: No   





(15) Tobacco use


SNOMED Code(s): 465073309


   ICD Code: Z72.0 - TOBACCO USE   Status: Chronic   Priority: Medium   Current 

Visit: Yes   


Problem List Initiated/Reviewed/Updated: Yes


Orders Last 24hrs: 


                               Active Orders 24 hr











 Category Date Time Status


 


 Admission Status [Patient Status] [ADT] Routine ADT  01/13/21 21:26 Active


 


 Admission Status [Patient Status] [ADT] Routine ADT  01/13/21 21:34 Active


 


 EKG Documentation Completion [RC] STAT Care  01/13/21 19:43 Active


 


 Up With Assistance [RC] ASDIRECTED Care  01/13/21 23:55 Active


 


 Regular Diet [DIET] Diet  01/14/21 Breakfast Active


 


 Chest 1V Frontal [CR] Stat Exams  01/13/21 20:47 Taken


 


 Pelvis 1V or 2V [CR] Stat Exams  01/13/21 19:30 Taken


 


 CULTURE URINE [RM] Stat Lab  01/13/21 19:43 Received


 


 Acetaminophen [TylenoL] Med  01/13/21 23:25 Active





 650 mg PO Q4H PRN   


 


 Dextrose 5%-0.9% NaCl [Dextrose 5%-Normal Saline] 1,000 Med  01/13/21 19:30 

Active





 ml   





 IV ASDIRECTED   


 


 Dextrose 5%-0.9% NaCl [Dextrose 5%-Normal Saline] 1,000 Med  01/13/21 23:00 

Active





 ml   





 IV ASDIRECTED   


 


 Phenazopyridine [Urinary Pain Relief] Med  01/13/21 23:26 Active





 95 mg PO Q8HR PRN   


 


 Code Status [Resuscitation Status] Routine Resus Stat  01/13/21 23:54 Ordered








                                Medication Orders





Acetaminophen (Tylenol)  650 mg PO Q4H PRN


   PRN Reason: Pain/Fever


Dextrose/Sodium Chloride (Dextrose 5%-Normal Saline)  1,000 mls @ 500 mls/hr IV 

ASDIRECTED [facility]


   Last Admin: 01/13/21 19:59  Dose: 500 mls/hr


   Documented by: MARANDA


Dextrose/Sodium Chloride (Dextrose 5%-Normal Saline)  1,000 mls @ 100 mls/hr IV 

ASDIRECTED Count includes the [facility]


Phenazopyridine HCl (Urinary Pain Relief)  95 mg PO Q8HR PRN


   PRN Reason: frequency/urgency








Assessment/Plan Comment:: 


Assessment - Day of admission - 1/14/2021 (admitted overnight)


* 84 yo brought to ED on 1/13/21 by daughter after falling the day prior 

  resulting in right hip pain


* Unsure why he fell but he woke up on ground and appeared to have fallen on 

  baseboard heater


* Continued weakness


* Reports he was on ground for approximately 6 hours and managed to crawl to 

  bedroom


* Soaked in urine after being forced to void on himself


* Denies fever, CP, SOB, Ab pain, but reports increased urinary frequency.


* Baseline diplopia, severe ptosis 2/2 bilateral 1.3cm aneurysms at the junction

  of middle cerebral and cerebellar arteries


* Hx/o HTN, AAA, COPD, chronic constipation, BPH, Prostate disorder, Recurrent 

  UTI, Chronic back pain, Pituitary tumor, tobacco use


* 12-lead EKG shows regular rhythm at 88 BPM. Unsure of origin with 

  undiscernable P-waves, Q-waves in V1-V2 and near Q-waves in V3. Irregularity 

  noted in limb leads. Mildly prolonged QTc.


* Hip x-ray shows osteopenia but no fractures


* CXR shows emphysematous change but nothing acute


* Labs:


   * WBC 13.96-->11.27


   * Hgb 13.9-->13.0


   * Plt 202-->211


   * Neutrophils 9.85-->7.87


   * Sodium 139-->141


   * Potassium 4.4-->4.0


   * Anion gap 13..4-->15.0


   * BUN 43-->40


   * Creatinine 1.4-->1.4


   * GFR 48-->48


   * Magnesium 2.3-->2.1


   * Bilirubin 0.6


   * AST 18


   * ALT 25


   * Alk Phos 64


   * CRP 8.7-->8.4


   * Albumin 3.2


   * ESR 31


   * Troponin I >0.017


   * Pro-BNP 1036


   * INR 1.11


   * Lactic acid 1.3


   * Ketones 0.2


   * UA: Cloudy, 2+ protein, 2+ occult blood, positive nitrite, 3+ leukocyte 

     esterase, 10-20 RBC, greater than 100 WBC, many bacteria


   * Urine culture pending


   * SARS-CoV-2 RNA negative


* Rocephin and IV fluids started in ED


* No CVA tenderness on exam. 


* History of E. Coli pyelonephritis with some resistant antibiotics at last 

  visit 1 year ago 


* Reports he was on BP medications but discontinued these several months ago on 

  his own


* Admitted to ICU as MSP overflow for treatment of UTI and acute weakness





PLAN:


Urinary tract infection


Volume depletion


Generalized muscle weakness


Fall as cause of accidental injury at home as place of occurrence


Unable to walk


* Continue IV Rocephin 1gm daily


* IV fluids as ordered


* PT/OT


* CM/SW consultation


* Pain medications as needed


* PRN Pyridium


* Await urine culture and sensitivities





Tobacco use


* Nicotine patch


* Cessation counseling 


* Offer nicotine patches on discharge





Chronic renal insufficiency, stage III (moderate)


Cerebral arterial aneurysm


Diplopia


Pituitary mass


AAA (abdominal aortic aneurysm)


COPD (chronic obstructive pulmonary disease)


Chronic back pain


HTN (hypertension)


Prostate disorder


* Stable


* Monitor vital signs





Code status: Full Code


[facility]


DVT prophylaxis: Lovenox


Social: Patient lives alone in a second story apartment. It does have an 

elevator. 


Disposition: Admitted to ICU as MSP overflow for treatment of UTI, volume 

depletion, and weakness. LOS estimated at 3-4 days. 








- Mortality Measure


Prognosis:: Good

## 2021-01-14 NOTE — CR
Pelvis: AP view of the pelvis was obtained.

 

Comparison: No previous study.

 

Severe joint space narrowing is noted superiorly within the right hip.

  Mild joint space narrowing is noted within the left hip.  Bony 

structures are osteopenic.  Disc space narrowing is noted within the 

lower lumbar spine.  I do not see a definite acute fracture on this 

single view exam.

 

Impression:

1.  Findings as noted above.

2.  Nothing acute is definitely appreciated on this single view study.

 

Diagnostic code #2

## 2021-01-15 NOTE — PCM.PN
- General Info


Date of Service: 01/15/21


Admission Dx/Problem (Free Text): 


                           Admission Diagnosis/Problem





Admission Diagnosis/Problem      Fall at home








Subjective Update: 


In to see Leighton he reports that he feels very good.  PT/OT is recommending a 

SNF rehab stay as the patient remains quite weak and has some difficulty with 

ambulation.  Per the patient if he turns his head right or left rapidly he will 

notice some dizziness.  This is likely due to his underlying bilateral 

aneurysms.  Denies any current headache.  Reports increased urinary frequency 

with minimal output.  He has a history of prostate problems.  Will start Flomax 

0.4 mg daily, as he has been on this in the past.  We will also start amlodipine

as patient has had quite high blood pressures of 160 systolic since here.  This 

again is another medication that he was on in the past.  He reports that he st

opped taking most of his medications, although he reports he has a significant 

supply of amlodipine at home.  We will continue Rocephin for now and he will 

likely be a candidate to change to p.o. medications over the weekend.  Hopeful 

for discharge Monday or Tuesday for SNF rehab stay.





Functional Status: Reports: Pain Controlled, Tolerating Diet, Ambulating, 

Urinating.  Denies: New Symptoms





- Review of Systems


General: Reports: Weakness.  Denies: Fever, Fatigue, Malaise, Chills


HEENT: Reports: No Symptoms.  Denies: Headaches, Sore Throat


Pulmonary: Reports: No Symptoms.  Denies: Shortness of Breath, Cough, Sputum, 

Wheezing


Cardiovascular: Reports: No Symptoms.  Denies: Chest Pain, Palpitations, Dyspnea

on Exertion, Edema


Gastrointestinal: Reports: No Symptoms.  Denies: Abdominal Pain, Constipation, 

Diarrhea, Nausea, Vomiting


Genitourinary: Reports: No Symptoms.  Denies: Pain


Musculoskeletal: Reports: No Symptoms


Skin: Reports: No Symptoms.  Denies: Cyanosis


Neurological: Reports: Difficulty Walking, Weakness, Gait Disturbance.  Denies: 

Confusion


Psychiatric: Reports: No Symptoms





- Patient Data


Vitals - Most Recent: 


                                Last Vital Signs











Temp  97.6 F   01/15/21 04:00


 


Pulse  61   01/14/21 21:00


 


Resp  16   01/15/21 04:00


 


BP  144/87 H  01/15/21 04:00


 


Pulse Ox  96   01/15/21 04:00











Weight - Most Recent: 170 lb


I&O - Last 24 Hours: 


                                 Intake & Output











 01/14/21 01/15/21 01/15/21





 22:59 06:59 14:59


 


Intake Total 2349 1480 


 


Output Total 600 387 


 


Balance 5069 650 











Lab Results Last 24 Hours: 


                         Laboratory Results - last 24 hr











  01/14/21 01/14/21 01/15/21 Range/Units





  08:25 08:25 05:08 


 


WBC  11.27 H   8.47  (4.23-9.07)  K/mm3


 


RBC  4.19 L   3.78 L  (4.63-6.08)  M/mm3


 


Hgb  13.0 L   11.7 L  (13.7-17.5)  gm/dl


 


Hct  40.6   36.7 L  (40.1-51.0)  %


 


MCV  96.9 H   97.1 H  (79.0-92.2)  fl


 


MCH  31.0   31.0  (25.7-32.2)  pg


 


MCHC  32.0 L   31.9 L  (32.2-35.5)  g/dl


 


RDW Std Deviation  49.3 H   48.5 H  (35.1-43.9)  fL


 


Plt Count  211   183  (163-337)  K/mm3


 


MPV  10.3   10.2  (9.4-12.3)  fl


 


Neut % (Auto)  69.9 H   66.1  (34.0-67.9)  %


 


Lymph % (Auto)  15.7 L   20.9 L  (21.8-53.1)  %


 


Mono % (Auto)  11.6   11.0  (5.3-12.2)  %


 


Eos % (Auto)  2.3   1.3  (0.8-7.0)  


 


Baso % (Auto)  0.4   0.6  (0.1-1.2)  %


 


Neut # (Auto)  7.87 H   5.60 H  (1.78-5.38)  K/mm3


 


Lymph # (Auto)  1.77   1.77  (1.32-3.57)  K/mm3


 


Mono # (Auto)  1.31 H   0.93 H  (0.30-0.82)  K/mm3


 


Eos # (Auto)  0.26   0.11  (0.04-0.54)  K/mm3


 


Baso # (Auto)  0.05   0.05  (0.01-0.08)  K/mm3


 


Sodium   141   (136-145)  mEq/L


 


Potassium   4.0   (3.5-5.1)  mEq/L


 


Chloride   105   ()  mEq/L


 


Carbon Dioxide   25   (21-32)  mEq/L


 


Anion Gap   15.0   (5-15)  


 


BUN   40 H   (7-18)  mg/dL


 


Creatinine   1.4 H   (0.7-1.3)  mg/dL


 


Est Cr Clr Drug Dosing   42.12   mL/min


 


Estimated GFR (MDRD)   48   (>60)  mL/min


 


BUN/Creatinine Ratio   28.6 H   (14-18)  


 


Glucose   117 H   ()  mg/dL


 


Calcium   8.7   (8.5-10.1)  mg/dL


 


Magnesium   2.1   (1.8-2.4)  mg/dl


 


C-Reactive Protein   8.4 H*   (<1.0)  mg/dL














  01/15/21 Range/Units





  05:08 


 


WBC   (4.23-9.07)  K/mm3


 


RBC   (4.63-6.08)  M/mm3


 


Hgb   (13.7-17.5)  gm/dl


 


Hct   (40.1-51.0)  %


 


MCV   (79.0-92.2)  fl


 


MCH   (25.7-32.2)  pg


 


MCHC   (32.2-35.5)  g/dl


 


RDW Std Deviation   (35.1-43.9)  fL


 


Plt Count   (163-337)  K/mm3


 


MPV   (9.4-12.3)  fl


 


Neut % (Auto)   (34.0-67.9)  %


 


Lymph % (Auto)   (21.8-53.1)  %


 


Mono % (Auto)   (5.3-12.2)  %


 


Eos % (Auto)   (0.8-7.0)  


 


Baso % (Auto)   (0.1-1.2)  %


 


Neut # (Auto)   (1.78-5.38)  K/mm3


 


Lymph # (Auto)   (1.32-3.57)  K/mm3


 


Mono # (Auto)   (0.30-0.82)  K/mm3


 


Eos # (Auto)   (0.04-0.54)  K/mm3


 


Baso # (Auto)   (0.01-0.08)  K/mm3


 


Sodium  143  (136-145)  mEq/L


 


Potassium  3.8  (3.5-5.1)  mEq/L


 


Chloride  109 H  ()  mEq/L


 


Carbon Dioxide  24  (21-32)  mEq/L


 


Anion Gap  13.8  (5-15)  


 


BUN  30 H  (7-18)  mg/dL


 


Creatinine  1.3  (0.7-1.3)  mg/dL


 


Est Cr Clr Drug Dosing  45.31  mL/min


 


Estimated GFR (MDRD)  52  (>60)  mL/min


 


BUN/Creatinine Ratio  23.1 H  (14-18)  


 


Glucose  123 H  ()  mg/dL


 


Calcium  8.5  (8.5-10.1)  mg/dL


 


Magnesium  2.1  (1.8-2.4)  mg/dl


 


C-Reactive Protein  5.9 H*  (<1.0)  mg/dL











Oren Results Last 24 Hours: 


                                  Microbiology











 01/13/21 19:43 Urine Culture - Preliminary





 Urine, Bladder    Gram Negative Rods











Med Orders - Current: 


                               Current Medications





Acetaminophen (Tylenol)  650 mg PO Q4H PRN


   PRN Reason: Pain/Fever


Hydrocodone Bitart/Acetaminophen (Norco 325-5 Mg)  1 tab PO Q4H PRN


   PRN Reason: Pain (moderate 4-6)


Docusate Sodium (Colace)  100 mg PO BID PRN


   PRN Reason: Constipation


Enoxaparin Sodium (Lovenox)  40 mg SUBCUT DAILY [facility]


   Last Admin: 01/14/21 09:29 Dose:  40 mg


   Documented by: 


Dextrose/Sodium Chloride (Dextrose 5%-Normal Saline)  1,000 mls @ 100 mls/hr IV 

ASDIRECTED [facility]


   Last Admin: 01/15/21 05:09 Dose:  100 mls/hr


   Documented by: 


Ceftriaxone Sodium 1 gm/ (Sodium Chloride)  100 mls @ 200 mls/hr IV Q24H [facility]


   Last Admin: 01/14/21 20:30 Dose:  200 mls/hr


   Documented by: 


Miscellaneous Information (Remove Patch)  0 ea TRDERM Q24H [facility]


Nicotine (Habitrol)  14 mg TRDERM Q24H [facility]


   Last Admin: 01/14/21 14:14 Dose:  Not Given


   Documented by: 


Ondansetron HCl (Zofran Odt)  4 mg PO Q6H PRN


   PRN Reason: nausea, able to take PO


Phenazopyridine HCl (Urinary Pain Relief)  95 mg PO Q8HR PRN


   PRN Reason: frequency/urgency





Discontinued Medications





Furosemide (Lasix)  40 mg IVPUSH DAILY ONE


   Stop: 01/14/21 06:01


   Last Admin: 01/14/21 06:32 Dose:  40 mg


   Documented by: 


Dextrose/Sodium Chloride (Dextrose 5%-Normal Saline)  1,000 mls @ 500 mls/hr IV 

ASDIRECTED [facility]


   Last Admin: 01/13/21 19:59 Dose:  500 mls/hr


   Documented by: 


Ceftriaxone Sodium 2 gm/ (Sodium Chloride)  100 mls @ 200 mls/hr IV ONETIME ONE


   Stop: 01/13/21 21:15


   Last Admin: 01/13/21 20:57 Dose:  200 mls/hr


   Documented by: 


Phenazopyridine HCl (Urinary Pain Relief)  95 mg PO ONETIME ONE


   Stop: 01/14/21 21:10


Phenazopyridine HCl (Urinary Pain Relief)  95 mg PO ONETIME ONE


   Stop: 01/13/21 21:10


   Last Admin: 01/13/21 21:48 Dose:  95 mg


   Documented by: 











- Exam


Quality Assessment: DVT Prophylaxis.  No: Supplemental Oxygen


General: Alert, Oriented, Cooperative


HEENT: Mucous Membr. Moist/Pink.  No: Pupils Equal (Baseline), Pupils Reactive (

Baseline)


Neck: Supple, Trachea Midline


Lungs: Clear to Auscultation, Normal Respiratory Effort


Cardiovascular: Regular Rate, Regular Rhythm


GI/Abdominal Exam: Normal Bowel Sounds, Soft, Non-Tender, No Distention


 (Male) Exam: Deferred


Back Exam: Normal Inspection, Full Range of Motion


Extremities: Normal Inspection, Normal Range of Motion, Non-Tender, No Pedal 

Edema, Normal Capillary Refill


Skin: Warm, Dry, Intact, Ecchymosis (Scattered)


Neurological: No New Focal Deficit


Psy/Mental Status: Alert, Normal Affect, Normal Mood





Sepsis Event Note





- Evaluation


Sepsis Screening Result: No Definite Risk





- Focused Exam


Vital Signs: 


                                   Vital Signs











  Temp Pulse Resp BP Pulse Ox


 


 01/15/21 04:00  97.6 F   16  144/87 H  96


 


 01/14/21 21:00  97.7 F  61  16  141/81 H  97














- Problem List & Annotations


(1) Chronic renal insufficiency, stage III (moderate)


SNOMED Code(s): 456534697


   Code(s): N18.30 - CHRONIC KIDNEY DISEASE, STAGE 3 UNSPECIFIED   Status: 

Chronic   Priority: Medium   Current Visit: Yes   


Qualifiers: 


   Chronic kidney disease stage 3 subtype: stage 3a (GFR 45-59)   Qualified 

Code(s): N18.31 - Chronic kidney disease, stage 3a   





(2) Fall as cause of accidental injury at home as place of occurrence


SNOMED Code(s): 11986848


   Code(s): W19.XXXA - UNSPECIFIED FALL, INITIAL ENCOUNTER; Y92.009 - UNSP PLACE

IN UNSP NON-INSTITUT (PRIVATE) RESIDENCE AS PLACE   Status: Acute   Priority: 

High   Current Visit: Yes   


Qualifiers: 


   Encounter type: initial encounter   Qualified Code(s): W19.XXXA - Unspecified

fall, initial encounter; Y92.009 - Unspecified place in unspecified non-

institutional (private) residence as the place of occurrence of the external 

cause   





(3) Generalized muscle weakness


SNOMED Code(s): 79911003, 36938702


   Code(s): M62.81 - MUSCLE WEAKNESS (GENERALIZED)   Status: Acute   Priority: 

High   Current Visit: Yes   





(4) Unable to walk


SNOMED Code(s): 536361622


   Code(s): R26.2 - DIFFICULTY IN WALKING, NOT ELSEWHERE CLASSIFIED   Status: 

Acute   Priority: High   Current Visit: Yes   





(5) Urinary tract infection


SNOMED Code(s): 25225380


   Code(s): N39.0 - URINARY TRACT INFECTION, SITE NOT SPECIFIED   Status: Acute 

 Priority: High   Current Visit: Yes   


Qualifiers: 


   Urinary tract infection type: acute cystitis   Hematuria presence: with 

hematuria   Qualified Code(s): N30.01 - Acute cystitis with hematuria   





(6) Volume depletion


SNOMED Code(s): 819576770


   Code(s): E86.9 - VOLUME DEPLETION, UNSPECIFIED   Status: Resolved   Priority:

High   Current Visit: Yes   





(7) Cerebral arterial aneurysm


SNOMED Code(s): 496325615


   Code(s): I67.1 - CEREBRAL ANEURYSM, NONRUPTURED   Status: Chronic   Priority:

Low   Current Visit: No   





(8) Diplopia


SNOMED Code(s): 61277663


   Code(s): H53.2 - DIPLOPIA   Status: Chronic   Priority: Low   Current Visit: 

No   





(9) Pituitary mass


Status: Chronic   Priority: Low   Current Visit: No   





(10) AAA (abdominal aortic aneurysm)


SNOMED Code(s): 676571034


   Code(s): I71.4 - ABDOMINAL AORTIC ANEURYSM, WITHOUT RUPTURE   Status: Chronic

  Priority: Medium   Current Visit: No   


Qualifiers: 


   Presence of rupture: without rupture   Qualified Code(s): I71.4 - Abdominal 

aortic aneurysm, without rupture   





(11) COPD (chronic obstructive pulmonary disease)


SNOMED Code(s): 08849180


   Code(s): J44.9 - CHRONIC OBSTRUCTIVE PULMONARY DISEASE, UNSPECIFIED   Status:

Chronic   Priority: Medium   Current Visit: No   


Qualifiers: 


   COPD type: unspecified COPD   Qualified Code(s): J44.9 - Chronic obstructive 

pulmonary disease, unspecified   





(12) Chronic back pain


SNOMED Code(s): 639991549


   Code(s): M54.9 - DORSALGIA, UNSPECIFIED; G89.29 - OTHER CHRONIC PAIN   

Status: Chronic   Priority: Low   Current Visit: No   


Qualifiers: 


   Back pain location: back pain in unspecified location   Back pain laterality:

unspecified   Qualified Code(s): M54.9 - Dorsalgia, unspecified; G89.29 - Other 

chronic pain   





(13) HTN (hypertension)


SNOMED Code(s): 62195947


   Code(s): I10 - ESSENTIAL (PRIMARY) HYPERTENSION   Status: Chronic   Priority:

Medium   Current Visit: Yes   


Qualifiers: 


   Hypertension type: unspecified   Qualified Code(s): I10 - Essential (primary)

hypertension   





(14) Prostate disorder


SNOMED Code(s): 73392422


   Code(s): N42.9 - DISORDER OF PROSTATE, UNSPECIFIED   Status: Chronic   

Priority: Medium   Current Visit: Yes   





(15) Tobacco use


SNOMED Code(s): 094358150


   Code(s): Z72.0 - TOBACCO USE   Status: Chronic   Priority: Medium   Current 

Visit: Yes   





- Problem List Review


Problem List Initiated/Reviewed/Updated: Yes





- My Orders


Last 24 Hours: 


My Active Orders





01/14/21 08:06


Height and Weight [RC] 04 


Intake and Output [RC] 04,16 


Oxygen Therapy [RC] PRN 


Pulse Oximetry [RC] PRN 


VTE/DVT Education [RC] 09,21 


Consult to Case Management/ [CONS] Routine 


Consult to Dietician [CONS] Routine 


Consult to Spiritual Care [CONS] Routine 


OT Evaluation and Treatment [CONS] Routine 


PT Evaluation and Treatment [CONS] Routine 


Acetaminophen/HYDROcodone [Norco 325-5 MG]   1 tab PO Q4H PRN 


Docusate Sodium [Colace]   100 mg PO BID PRN 


Ondansetron [Zofran ODT]   4 mg PO Q6H PRN 





01/14/21 09:00


Enoxaparin [Lovenox]   40 mg SUBCUT DAILY 





01/14/21 11:00


Nicotine [Habitrol]   14 mg TRDERM Q24H 





01/14/21 21:00


cefTRIAXone [Rocephin] 1 gm   Sodium Chloride 0.9% [Normal Saline] 100 ml IV 

Q24H 





01/15/21 11:00


Remove Patch   0 ea TRDERM Q24H 





01/16/21 05:11


BASIC METABOLIC PANEL,BMP [CHEM] AM 


CBC WITH AUTO DIFF [HEME] AM 


CRP [C-REACTIVE PROTEIN] [CHEM] AM 


MAGNESIUM [CHEM] AM 





01/17/21 05:11


BASIC METABOLIC PANEL,BMP [CHEM] AM 


CBC WITH AUTO DIFF [HEME] AM 


CRP [C-REACTIVE PROTEIN] [CHEM] AM 


MAGNESIUM [CHEM] AM 





01/18/21 05:11


BASIC METABOLIC PANEL,BMP [CHEM] AM 


CBC WITH AUTO DIFF [HEME] AM 


CRP [C-REACTIVE PROTEIN] [CHEM] AM 


MAGNESIUM [CHEM] AM 














- Assessment


Assessment:: 


Assessment - Day of admission - 1/14/2021 (admitted overnight)


* 86 yo brought to ED on 1/13/21 by daughter after falling the day prior 

  resulting in right hip pain


* Unsure why he fell but he woke up on ground and appeared to have fallen on 

  baseboard heater


* Continued weakness


* Reports he was on ground for approximately 6 hours and managed to crawl to 

  bedroom


* Soaked in urine after being forced to void on himself


* Denies fever, CP, SOB, Ab pain, but reports increased urinary frequency.


* Baseline diplopia, severe ptosis 2/2 bilateral 1.3cm aneurysms at the junction

   of middle cerebral and cerebellar arteries


* Hx/o HTN, AAA, COPD, chronic constipation, BPH, Prostate disorder, Recurrent 

  UTI, Chronic back pain, Pituitary tumor, tobacco use


* 12-lead EKG shows regular rhythm at 88 BPM. Unsure of origin with undiscernab

  le P-waves, Q-waves in V1-V2 and near Q-waves in V3. Irregularity noted in 

  limb leads. Mildly prolonged QTc.


* Hip x-ray shows osteopenia but no fractures


* CXR shows emphysematous change but nothing acute


* Labs:


   * WBC 13.96-->11.27


   * Hgb 13.9-->13.0


   * Plt 202-->211


   * Neutrophils 9.85-->7.87


   * Sodium 139-->141


   * Potassium 4.4-->4.0


   * Anion gap 13..4-->15.0


   * BUN 43-->40


   * Creatinine 1.4-->1.4


   * GFR 48-->48


   * Magnesium 2.3-->2.1


   * Bilirubin 0.6


   * AST 18


   * ALT 25


   * Alk Phos 64


   * CRP 8.7-->8.4


   * Albumin 3.2


   * ESR 31


   * Troponin I >0.017


   * Pro-BNP 1036


   * INR 1.11


   * Lactic acid 1.3


   * Ketones 0.2


   * UA: Cloudy, 2+ protein, 2+ occult blood, positive nitrite, 3+ leukocyte 

     esterase, 10-20 RBC, greater than 100 WBC, many bacteria


   * Urine culture pending


   * SARS-CoV-2 RNA negative


* Rocephin and IV fluids started in ED


* No CVA tenderness on exam. 


* History of E. Coli pyelonephritis with some resistant antibiotics at last 

  visit 1 year ago 


* Reports he was on BP medications but discontinued these several months ago on 

  his own


* Admitted to ICU as MSP overflow for treatment of UTI and acute weakness





1/15/2021


* Patient reports he feels pretty good


* Frequent attempts at urination with minimal output.  Will start Flomax daily. 

   Has been on this before.


* Blood pressure up to 160s systolic.  Will start amlodipine 5mg.  Patient has 

  been on this before and stopped taking.


* PT/OT recommending SNF rehab stay


* Social work working on arranging placement as [facility] is [facility]


* Reports dizziness when rapidly moving head side to side.


* Labs:


   * WBC 8.47.


   * Hemoglobin 11.7


   * Neutrophils 5.60


   * Sodium 143


   * Potassium 3.8


   * BUN 30, creatinine 1.3, GFR 52


   * Magnesium 2.1


   * CRP 5.9


* Urine culture growing E. Coli with some resistant antibiotics


* Continue current treatment plan


* Hopeful for discharge 1/18-1/19 pending placement. 








- Plan


Plan:: 


Urinary tract infection


Volume depletion


Generalized muscle weakness


Fall as cause of accidental injury at home as place of occurrence


Unable to walk


* Continue IV Rocephin 1gm daily


* Discontinue IV fluids 


* PT/OT


* CM/SW consultation


* Pain medications as needed


* PRN Pyridium


* Urine cultures showing E. Coli with some resistant antibiotics


* Consider switching to PO medications this weekend 





Tobacco use


* Nicotine patch


* Cessation counseling 


* Offer nicotine patches on discharge





Chronic renal insufficiency, stage III (moderate)


Cerebral arterial aneurysm


Diplopia


Pituitary mass


AAA (abdominal aortic aneurysm)


COPD (chronic obstructive pulmonary disease)


Chronic back pain


HTN (hypertension)


Prostate disorder


* Start 5mg amlodipine


* Start 0.4 mg Flomax


* Monitor vital signs





Code status: Full Code


[facility]P: [facility]


DVT prophylaxis: Lovenox


Social: Patient lives alone in a second story apartment. It does have an 

elevator. 


Disposition: Admitted to ICU as MSP overflow for treatment of UTI, volume 

depletion, and weakness. PT/OT recommending SNF rehab stay. Likely discharge 

1/25-1/26 pending placement. 





LOS >96 Hrs pending placement

## 2021-01-16 NOTE — PCM.PN
- General Info


Date of Service: 01/16/21


Admission Dx/Problem (Free Text): 


                           Admission Diagnosis/Problem





Admission Diagnosis/Problem      Fall at home








Subjective Update: 


No overnight or acute issues. He remains somewhat weak with unsteady gait. He is

afebrile w/o leukocytosis. No dizziness or lightheadedness. His urination has 

improve d with flomax. He is eating and drinking fine. He has been refusing his 

lovenox for dvt prophylaxis.


Functional Status: Reports: Pain Controlled





- Review of Systems


General: Reports: Weakness.  Denies: Fever, Fatigue, Malaise


HEENT: Denies: Headaches, Sore Throat


Pulmonary: Denies: Shortness of Breath, Cough


Cardiovascular: Denies: Chest Pain, Dyspnea on Exertion, Lightheadedness


Gastrointestinal: Denies: Abdominal Pain, Nausea, Vomiting


Genitourinary: Reports: Retention.  Denies: Dysuria, Burning


Musculoskeletal: Denies: Joint Pain


Skin: Reports: Bruising.  Denies: Rash


Neurological: Reports: Difficulty Walking, Weakness, Gait Disturbance.  Denies: 

Dizziness, Syncope


Psychiatric: Denies: Depression, Anxiety





- Patient Data


Vitals - Most Recent: 


                                Last Vital Signs











Temp  36.4 C   01/16/21 05:11


 


Pulse  70   01/16/21 05:11


 


Resp  16   01/16/21 05:11


 


BP  129/85   01/16/21 05:11


 


Pulse Ox  93 L  01/16/21 05:11











Weight - Most Recent: 78.199 kg


I&O - Last 24 Hours: 


                                 Intake & Output











 01/15/21 01/16/21 01/16/21





 22:59 06:59 14:59


 


Intake Total 200 1900 


 


Output Total 1200 450 


 


Balance -1000 1450 











Lab Results Last 24 Hours: 


                         Laboratory Results - last 24 hr











  01/16/21 01/16/21 Range/Units





  06:54 06:54 


 


WBC  8.08   (4.23-9.07)  K/mm3


 


RBC  3.79 L   (4.63-6.08)  M/mm3


 


Hgb  11.5 L   (13.7-17.5)  gm/dl


 


Hct  36.8 L   (40.1-51.0)  %


 


MCV  97.1 H   (79.0-92.2)  fl


 


MCH  30.3   (25.7-32.2)  pg


 


MCHC  31.3 L   (32.2-35.5)  g/dl


 


RDW Std Deviation  48.2 H   (35.1-43.9)  fL


 


Plt Count  215   (163-337)  K/mm3


 


MPV  9.9   (9.4-12.3)  fl


 


Neut % (Auto)  57.6   (34.0-67.9)  %


 


Lymph % (Auto)  24.5   (21.8-53.1)  %


 


Mono % (Auto)  12.0   (5.3-12.2)  %


 


Eos % (Auto)  4.8   (0.8-7.0)  


 


Baso % (Auto)  0.9   (0.1-1.2)  %


 


Neut # (Auto)  4.65   (1.78-5.38)  K/mm3


 


Lymph # (Auto)  1.98   (1.32-3.57)  K/mm3


 


Mono # (Auto)  0.97 H   (0.30-0.82)  K/mm3


 


Eos # (Auto)  0.39   (0.04-0.54)  K/mm3


 


Baso # (Auto)  0.07   (0.01-0.08)  K/mm3


 


Sodium   143  (136-145)  mEq/L


 


Potassium   4.0  (3.5-5.1)  mEq/L


 


Chloride   108 H  ()  mEq/L


 


Carbon Dioxide   26  (21-32)  mEq/L


 


Anion Gap   13.0  (5-15)  


 


BUN   28 H  (7-18)  mg/dL


 


Creatinine   1.2  (0.7-1.3)  mg/dL


 


Est Cr Clr Drug Dosing   49.78  mL/min


 


Estimated GFR (MDRD)   58  (>60)  mL/min


 


BUN/Creatinine Ratio   23.3 H  (14-18)  


 


Glucose   88  ()  mg/dL


 


Calcium   8.6  (8.5-10.1)  mg/dL


 


Magnesium   2.2  (1.8-2.4)  mg/dl


 


C-Reactive Protein   2.7 H*  (<1.0)  mg/dL











Oren Results Last 24 Hours: 


                                  Microbiology











 01/13/21 19:43 Urine Culture - Preliminary





 Urine, Bladder    Escherichia Coli











Med Orders - Current: 


                               Current Medications





Acetaminophen (Tylenol)  650 mg PO Q4H PRN


   PRN Reason: Pain/Fever


Hydrocodone Bitart/Acetaminophen (Norco 325-5 Mg)  1 tab PO Q4H PRN


   PRN Reason: Pain (moderate 4-6)


Amlodipine Besylate (Norvasc)  5 mg PO DAILY [facility]


   Last Admin: 01/15/21 13:27 Dose:  5 mg


   Documented by: 


Amlodipine Besylate (Norvasc)  10 mg PO DAILY [facility]


Cholecalciferol (Vitamin D3)  50 mcg PO DAILY [facility]


Docusate Sodium (Colace)  100 mg PO BID PRN


   PRN Reason: Constipation


Enoxaparin Sodium (Lovenox)  40 mg SUBCUT DAILY [facility]


   Last Admin: 01/15/21 08:39 Dose:  Not Given


   Documented by: 


Ceftriaxone Sodium 1 gm/ (Sodium Chloride)  100 mls @ 200 mls/hr IV Q24H [facility]


   Last Admin: 01/15/21 20:52 Dose:  200 mls/hr


   Documented by: 


Miscellaneous Information (Remove Patch)  0 ea TRDERM Q24H [facility]


   Last Admin: 01/15/21 10:16 Dose:  Not Given


   Documented by: 


Nicotine (Habitrol)  14 mg TRDERM Q24H [facility]


   Last Admin: 01/15/21 10:16 Dose:  Not Given


   Documented by: 


Ondansetron HCl (Zofran Odt)  4 mg PO Q6H PRN


   PRN Reason: nausea, able to take PO


Phenazopyridine HCl (Urinary Pain Relief)  95 mg PO Q8HR PRN


   PRN Reason: frequency/urgency


Tamsulosin HCl (Flomax)  0.4 mg PO PCBREAKFAST [facility]





Discontinued Medications





Furosemide (Lasix)  40 mg IVPUSH DAILY ONE


   Stop: 01/14/21 06:01


   Last Admin: 01/14/21 06:32 Dose:  40 mg


   Documented by: 


Dextrose/Sodium Chloride (Dextrose 5%-Normal Saline)  1,000 mls @ 500 mls/hr IV 

ASDIRECTED [facility]


   Last Admin: 01/13/21 19:59 Dose:  500 mls/hr


   Documented by: 


Ceftriaxone Sodium 2 gm/ (Sodium Chloride)  100 mls @ 200 mls/hr IV ONETIME ONE


   Stop: 01/13/21 21:15


   Last Admin: 01/13/21 20:57 Dose:  200 mls/hr


   Documented by: 


Dextrose/Sodium Chloride (Dextrose 5%-Normal Saline)  1,000 mls @ 100 mls[facility]hr [facility] 

[facility]


   Last Admin: 01/15/21 05:09 Dose:  100 mls/hr


   Documented by: 


Phenazopyridine HCl (Urinary Pain Relief)  95 mg PO ONETIME ONE


   Stop: 01/14/21 21:10


Phenazopyridine HCl (Urinary Pain Relief)  95 mg PO ONETIME ONE


   Stop: 01/13/21 21:10


   Last Admin: 01/13/21 21:48 Dose:  95 mg


   Documented by: 


Tamsulosin HCl (Flomax)  0.4 mg PO ONETIME ONE


   Stop: 01/15/21 13:01


   Last Admin: 01/15/21 13:27 Dose:  0.4 mg


   Documented by: 











- Exam


Quality Assessment: No: Supplemental Oxygen


General: Alert, Cooperative, No Acute Distress


HEENT: EOMI, Mucous Membr. Moist/Pink, Other (ptosis on left eye)


Neck: Supple


Lungs: Clear to Auscultation, Normal Respiratory Effort


Cardiovascular: Regular Rate, Regular Rhythm


GI/Abdominal Exam: Normal Bowel Sounds, Soft, No Organomegaly, No Distention, No

Abnormal Bruit, No Mass


 (Male) Exam: Deferred


Back Exam: Normal Inspection, Decreased Range of Motion


Extremities: Normal Inspection, Normal Range of Motion, Non-Tender, No Pedal 

Edema, Normal Capillary Refill


Peripheral Pulses: 2+: Dorsalis Pedis (L), Dorsalis Pedis (R)


Skin: Warm, Dry, Intact


Neurological: No New Focal Deficit.  No: Normal Gait


Psy/Mental Status: Alert, Normal Affect, Normal Mood





Sepsis Event Note





- Evaluation


Sepsis Screening Result: No Definite Risk





- Focused Exam


Vital Signs: 


                                   Vital Signs











  Temp Pulse Resp BP Pulse Ox


 


 01/16/21 05:11  36.4 C  70  16  129/85  93 L


 


 01/16/21 00:01  36.4 C  59 L  20  136/62  95


 


 01/15/21 22:24  36.4 C  60  18  136/84  95














- Problem List Review


Problem List Initiated/Reviewed/Updated: Yes





- My Orders


Last 24 Hours: 


My Active Orders





01/16/21 09:00


Cholecalciferol (Vitamin D3) [Vitamin D3]   50 mcg PO DAILY 


amLODIPine [Norvasc]   10 mg PO DAILY 














- Assessment


Assessment:: 


Assessment - Day of admission - 1/14/2021 (admitted overnight)


* 84 yo brought to ED on 1/13/21 by daughter after falling the day prior 

  resulting in right hip pain


* Unsure why he fell but he woke up on ground and appeared to have fallen on 

  baseboard heater


* Continued weakness


* Reports he was on ground for approximately 6 hours and managed to crawl to b

  edroom


* Soaked in urine after being forced to void on himself


* Denies fever, CP, SOB, Ab pain, but reports increased urinary frequency.


* Baseline diplopia, severe ptosis 2/2 bilateral 1.3cm aneurysms at the junction

   of middle cerebral and cerebellar arteries


* Hx/o HTN, AAA, COPD, chronic constipation, BPH, Prostate disorder, Recurrent 

  UTI, Chronic back pain, Pituitary tumor, tobacco use


* 12-lead EKG shows regular rhythm at 88 BPM. Unsure of origin with 

  undiscernable P-waves, Q-waves in V1-V2 and near Q-waves in V3. Irregularity 

  noted in limb leads. Mildly prolonged QTc.


* Hip x-ray shows osteopenia but no fractures


* CXR shows emphysematous change but nothing acute


* Labs:


   * WBC 13.96-->11.27


   * Hgb 13.9-->13.0


   * Plt 202-->211


   * Neutrophils 9.85-->7.87


   * Sodium 139-->141


   * Potassium 4.4-->4.0


   * Anion gap 13..4-->15.0


   * BUN 43-->40


   * Creatinine 1.4-->1.4


   * GFR 48-->48


   * Magnesium 2.3-->2.1


   * Bilirubin 0.6


   * AST 18


   * ALT 25


   * Alk Phos 64


   * CRP 8.7-->8.4


   * Albumin 3.2


   * ESR 31


   * Troponin I >0.017


   * Pro-BNP 1036


   * INR 1.11


   * Lactic acid 1.3


   * Ketones 0.2


   * UA: Cloudy, 2+ protein, 2+ occult blood, positive nitrite, 3+ leukocyte 

     esterase, 10-20 RBC, greater than 100 WBC, many bacteria


   * Urine culture pending


   * SARS-CoV-2 RNA negative


* Rocephin and IV fluids started in ED


* No CVA tenderness on exam. 


* History of E. Coli pyelonephritis with some resistant antibiotics at last 

  visit 1 year ago 


* Reports he was on BP medications but discontinued these several months ago on 

  his own


* Admitted to ICU as [facility] overflow for treatment of UTI and acute weakness





1/15/2021


* Patient reports he feels pretty good


* Frequent attempts at urination with minimal output.  Will start Flomax daily. 

   Has been on this before.


* Blood pressure up to 160s systolic.  Will start amlodipine 5mg.  Patient has 

  been on this before and stopped taking.


* PT/OT recommending SNF rehab stay


* Social work working on arranging placement as [facility] is [facility].


* Reports dizziness when rapidly moving head side to side.


* Labs:


   * WBC 8.47.


   * Hemoglobin 11.7


   * Neutrophils 5.60


   * Sodium 143


   * Potassium 3.8


   * BUN 30, creatinine 1.3, GFR 52


   * Magnesium 2.1


   * CRP 5.9


* Urine culture growing E. Coli with some resistant antibiotics


* Continue current treatment plan


* Hopeful for discharge 1/18-1/19 pending placement. 





1/16/2021


* He looks clinically stable


* Urination imp[roved with flomax, we will continue 


* Resume home dose of Norvasc 10 mg po daily


* Continue PT/OT for deconditioning


* Social work working on arranging placement as patient is [facility]


* He has been refusing lovenox-->we will offer SCDs


* Urine culture growing E. Coli, last dose of IV abx will be tomorrow 


* Continue current treatment plan


* LOS > 96hrs pending placement for SNF: Possible [facility] or [facility] this 

  coming week








- Plan


Plan:: 


Urinary tract infection


Volume depletion


Generalized muscle weakness


Fall as cause of accidental injury at home as place of occurrence


Unable to walk


* Continue IV Rocephin 1gm daily, last day tomorrow (4th tomorrow)


* Discontinue IV fluids 


* PT/OT for deconditioning


* CM/SW consultation


* Pain medications as needed


* PRN Pyridium


* Urine cultures showing E. Coli with some resistant antibiotics, sensitive to 

  Rocephin


* Consider switching to PO medications this weekend 





Tobacco use


* Nicotine patch, refused it


* Cessation counseling 


* Offer nicotine patches on discharge





Chronic renal insufficiency, stage III (moderate)


Cerebral arterial aneurysm


Diplopia


Pituitary mass


AAA (abdominal aortic aneurysm)


COPD (chronic obstructive pulmonary disease)


Chronic back pain


HTN (hypertension), controlled 


Prostate disorder


* Resume home dos 10 mg amlodipine


* Continue 0.4 mg po Flomax daily, urinary symptom improved


* Monitor vital signs





Code status: Full Code


PCP: [facility]


DVT prophylaxis: Lovenox--> will switch to SCDs


Social: Patient lives alone in a second story apartment. It does have an 

elevator. 


Disposition: Admitted to ICU as [facility] overflow for treatment of UTI, volume 

depletion, and weakness. PT/OT recommending SNF rehab stay. Likely discharge 

next week pending placement. 





LOS >96 Hrs pending placement come next week.

## 2021-01-17 NOTE — PCM.PN
- General Info


Date of Service: 01/17/21


Admission Dx/Problem (Free Text): 


                           Admission Diagnosis/Problem





Admission Diagnosis/Problem      Fall at home








Subjective Update: 


No overnight or acute issues. He reports no urinary retention but having to 

urinate a lot.  


Functional Status: Reports: Pain Controlled, Tolerating Diet, Urinating.  

Denies: New Symptoms





- Review of Systems


General: Reports: Weakness.  Denies: Fever, Chills


HEENT: Denies: Contact Lenses


Pulmonary: Denies: Shortness of Breath, Cough


Gastrointestinal: Denies: Abdominal Pain, Nausea, Vomiting


Genitourinary: Reports: Frequency


Musculoskeletal: Denies: Joint Pain


Skin: Denies: Bruising, Pruritis, Rash


Neurological: Reports: Difficulty Walking, Weakness, Gait Disturbance.  Denies: 

Confusion


Psychiatric: Denies: Mood Lability, Anxiety





- Patient Data


Vitals - Most Recent: 


                                Last Vital Signs











Temp  36.6 C   01/17/21 04:41


 


Pulse  68   01/17/21 04:41


 


Resp  14   01/17/21 04:41


 


BP  142/78 H  01/17/21 04:41


 


Pulse Ox  95   01/17/21 04:41











Weight - Most Recent: 78.063 kg


I&O - Last 24 Hours: 


                                 Intake & Output











 01/16/21 01/17/21 01/17/21





 22:59 06:59 14:59


 


Intake Total 780 500 


 


Output Total 875 700 


 


Balance -95 -200 











Lab Results Last 24 Hours: 


                         Laboratory Results - last 24 hr











  01/17/21 01/17/21 Range/Units





  04:30 04:30 


 


WBC  8.77   (4.23-9.07)  K/mm3


 


RBC  4.08 L   (4.63-6.08)  M/mm3


 


Hgb  12.4 L   (13.7-17.5)  gm/dl


 


Hct  39.4 L   (40.1-51.0)  %


 


MCV  96.6 H   (79.0-92.2)  fl


 


MCH  30.4   (25.7-32.2)  pg


 


MCHC  31.5 L   (32.2-35.5)  g/dl


 


RDW Std Deviation  47.9 H   (35.1-43.9)  fL


 


Plt Count  239   (163-337)  K/mm3


 


MPV  10.5   (9.4-12.3)  fl


 


Neut % (Auto)  54.4   (34.0-67.9)  %


 


Lymph % (Auto)  28.3   (21.8-53.1)  %


 


Mono % (Auto)  10.1   (5.3-12.2)  %


 


Eos % (Auto)  6.3   (0.8-7.0)  


 


Baso % (Auto)  0.8   (0.1-1.2)  %


 


Neut # (Auto)  4.77   (1.78-5.38)  K/mm3


 


Lymph # (Auto)  2.48   (1.32-3.57)  K/mm3


 


Mono # (Auto)  0.89 H   (0.30-0.82)  K/mm3


 


Eos # (Auto)  0.55 H   (0.04-0.54)  K/mm3


 


Baso # (Auto)  0.07   (0.01-0.08)  K/mm3


 


Sodium   143  (136-145)  mEq/L


 


Potassium   4.6  (3.5-5.1)  mEq/L


 


Chloride   106  ()  mEq/L


 


Carbon Dioxide   26  (21-32)  mEq/L


 


Anion Gap   15.6 H  (5-15)  


 


BUN   31 H  (7-18)  mg/dL


 


Creatinine   1.3  (0.7-1.3)  mg/dL


 


Est Cr Clr Drug Dosing   45.95  mL/min


 


Estimated GFR (MDRD)   52  (>60)  mL/min


 


BUN/Creatinine Ratio   23.8 H  (14-18)  


 


Glucose   99  ()  mg/dL


 


Calcium   8.9  (8.5-10.1)  mg/dL


 


Magnesium   2.2  (1.8-2.4)  mg/dl


 


C-Reactive Protein   1.9 H*  (<1.0)  mg/dL











Oren Results Last 24 Hours: 


                                  Microbiology











 01/13/21 19:43 Urine Culture - Preliminary





 Urine, Bladder    Escherichia Coli











Med Orders - Current: 


                               Current Medications





Acetaminophen (Tylenol)  650 mg PO Q4H PRN


   PRN Reason: Pain/Fever


Hydrocodone Bitart/Acetaminophen (Norco 325-5 Mg)  1 tab PO Q4H PRN


   PRN Reason: Pain (moderate 4-6)


Amlodipine Besylate (Norvasc)  10 mg PO DAILY [facility]


   Last Admin: 01/16/21 09:47 Dose:  10 mg


   Documented by: 


Cholecalciferol (Vitamin D3)  50 mcg PO DAILY [facility]


   Last Admin: 01/16/21 09:04 Dose:  50 mcg


   Documented by: 


Docusate Sodium (Colace)  100 mg PO BID PRN


   PRN Reason: Constipation


   Last Admin: 01/16/21 21:42 Dose:  100 mg


   Documented by: 


Enoxaparin Sodium (Lovenox)  40 mg SUBCUT DAILY [facility]


   Last Admin: 01/16/21 09:48 Dose:  Not Given


   Documented by: 


Ceftriaxone Sodium 1 gm/ (Sodium Chloride)  100 mls @ 200 mls/hr IV Q24H [facility]


   Stop: 01/17/21 21:01


   Last Admin: 01/16/21 21:42 Dose:  200 mls/hr


   Documented by: 


Miscellaneous Information (Remove Patch)  0 ea TRDERM Q24H [facility]


   Last Admin: 01/16/21 10:06 Dose:  Not Given


   Documented by: 


Nicotine (Habitrol)  14 mg TRDERM Q24H [facility]


   Last Admin: 01/16/21 10:06 Dose:  Not Given


   Documented by: 


Ondansetron HCl (Zofran Odt)  4 mg PO Q6H PRN


   PRN Reason: nausea, able to take PO


Phenazopyridine HCl (Urinary Pain Relief)  95 mg PO Q8HR PRN


   PRN Reason: frequency/urgency


Tamsulosin HCl (Flomax)  0.4 mg PO PCBREAKFAST [facility]


   Last Admin: 01/16/21 09:04 Dose:  0.4 mg


   Documented by: 





Discontinued Medications





Amlodipine Besylate (Norvasc)  5 mg PO DAILY [facility]


   Last Admin: 01/16/21 09:46 Dose:  Not Given


   Documented by: 


Furosemide (Lasix)  40 mg IVPUSH DAILY ONE


   Stop: 01/14/21 06:01


   Last Admin: 01/14/21 06:32 Dose:  40 mg


   Documented by: 


Dextrose/Sodium Chloride (Dextrose 5%-Normal Saline)  1,000 mls @ 500 mls/hr IV 

ASDIRECTED [facility]


   Last Admin: 01/13/21 19:59 Dose:  500 mls/hr


   Documented by: 


Ceftriaxone Sodium 2 gm/ (Sodium Chloride)  100 mls @ 200 mls/hr IV ONETIME ONE


   Stop: 01/13/21 21:15


   Last Admin: 01/13/21 20:57 Dose:  200 mls/hr


   Documented by: 


Dextrose/Sodium Chloride (Dextrose 5%-Normal Saline)  1,000 mls @ 100 mls/hr IV 

ASDIRECTED [facility]


   Last Admin: 01/15/21 05:09 Dose:  100 mls/hr


   Documented by: 


Phenazopyridine HCl (Urinary Pain Relief)  95 mg PO ONETIME ONE


   Stop: 01/14/21 21:10


Phenazopyridine HCl (Urinary Pain Relief)  95 mg PO ONETIME ONE


   Stop: 01/13/21 21:10


   Last Admin: 01/13/21 21:48 Dose:  95 mg


   Documented by: 


Tamsulosin HCl (Flomax)  0.4 mg PO ONETIME ONE


   Stop: 01/15/21 13:01


   Last Admin: 01/15/21 13:27 Dose:  0.4 mg


   Documented by: 











- Exam


Quality Assessment: No: Supplemental Oxygen


General: Alert, Oriented, Cooperative


HEENT: Pupils Equal, Pupils Reactive, EOMI, Mucous Membr. Moist/Pink, Other 

(poor oral hygiene)


Neck: Supple


Lungs: Normal Respiratory Effort, Wheezing (mild occasional expiratory wheezing)


Cardiovascular: Regular Rate, Regular Rhythm


GI/Abdominal Exam: Normal Bowel Sounds, Soft, Non-Tender, No Organomegaly, No 

Distention, No Abnormal Bruit


 (Male) Exam: Deferred


Back Exam: Normal Inspection, Decreased Range of Motion


Extremities: Normal Inspection, Normal Range of Motion, Non-Tender, No Pedal 

Edema, Normal Capillary Refill, Other (muscle atropy)


Peripheral Pulses: 2+: Dorsalis Pedis (L), Dorsalis Pedis (R)


Skin: Warm, Dry, Intact


Neurological: No New Focal Deficit.  No: Normal Gait


Psy/Mental Status: Alert, Normal Affect, Normal Mood





Sepsis Event Note





- Evaluation


Sepsis Screening Result: No Definite Risk





- Focused Exam


Vital Signs: 


                                   Vital Signs











  Temp Pulse Resp BP BP Pulse Ox


 


 01/17/21 04:41  36.6 C  68  14   142/78 H  95


 


 01/16/21 19:29  36.5 C  71  20  107/63   93 L














- Problem List Review


Problem List Initiated/Reviewed/Updated: Yes





- My Orders


Last 24 Hours: 


My Active Orders





01/16/21 09:00


Cholecalciferol (Vitamin D3) [Vitamin D3]   50 mcg PO DAILY 


amLODIPine [Norvasc]   10 mg PO DAILY 





01/16/21 13:25


Antiembolic Devices [RC] BID 


Sequential Compression Device [OM.PC] Routine 














- Assessment


Assessment:: 


Assessment - Day of admission - 1/14/2021 (admitted overnight)


* 84 yo brought to ED on 1/13/21 by daughter after falling the day prior 

  resulting in right hip pain


* Unsure why he fell but he woke up on ground and appeared to have fallen on ba

  seboard heater


* Continued weakness


* Reports he was on ground for approximately 6 hours and managed to crawl to 

  bedroom


* Soaked in urine after being forced to void on himself


* Denies fever, CP, SOB, Ab pain, but reports increased urinary frequency.


* Baseline diplopia, severe ptosis 2/2 bilateral 1.3cm aneurysms at the junction

   of middle cerebral and cerebellar arteries


* Hx/o HTN, AAA, COPD, chronic constipation, BPH, Prostate disorder, Recurrent 

  UTI, Chronic back pain, Pituitary tumor, tobacco use


* 12-lead EKG shows regular rhythm at 88 BPM. Unsure of origin with 

  undiscernable P-waves, Q-waves in V1-V2 and near Q-waves in V3. Irregularity 

  noted in limb leads. Mildly prolonged QTc.


* Hip x-ray shows osteopenia but no fractures


* CXR shows emphysematous change but nothing acute


* Labs:


   * WBC 13.96-->11.27


   * Hgb 13.9-->13.0


   * Plt 202-->211


   * Neutrophils 9.85-->7.87


   * Sodium 139-->141


   * Potassium 4.4-->4.0


   * Anion gap 13..4-->15.0


   * BUN 43-->40


   * Creatinine 1.4-->1.4


   * GFR 48-->48


   * Magnesium 2.3-->2.1


   * Bilirubin 0.6


   * AST 18


   * ALT 25


   * Alk Phos 64


   * CRP 8.7-->8.4


   * Albumin 3.2


   * ESR 31


   * Troponin I >0.017


   * Pro-BNP 1036


   * INR 1.11


   * Lactic acid 1.3


   * Ketones 0.2


   * UA: Cloudy, 2+ protein, 2+ occult blood, positive nitrite, 3+ leukocyte 

     esterase, 10-20 RBC, greater than 100 WBC, many bacteria


   * Urine culture pending


   * SARS-CoV-2 RNA negative


* Rocephin and IV fluids started in ED


* No CVA tenderness on exam. 


* History of E. Coli pyelonephritis with some resistant antibiotics at last 

  visit 1 year ago 


* Reports he was on BP medications but discontinued these several months ago on 

  his own


* Admitted to ICU as [facility] overflow for treatment of UTI and acute weakness





1/15/2021


* Patient reports he feels pretty good


* Frequent attempts at urination with minimal output.  Will start Flomax daily. 

   Has been on this before.


* Blood pressure up to 160s systolic.  Will start amlodipine 5mg.  Patient has 

  been on this before and stopped taking.


* PT/OT recommending SNF rehab stay


* Social work working on arranging placement as [facility] is [facility].


* Reports dizziness when rapidly moving head side to side.


* Labs:


   * WBC 8.47.


   * Hemoglobin 11.7


   * Neutrophils 5.60


   * Sodium 143


   * Potassium 3.8


   * BUN 30, creatinine 1.3, GFR 52


   * Magnesium 2.1


   * CRP 5.9


* Urine culture growing E. Coli with some resistant antibiotics


* Continue current treatment plan


* Hopeful for discharge 1/18-1/19 pending placement. 





1/16/2021


* He looks clinically stable


* Urination imp[roved with flomax, we will continue 


* Resume home dose of Norvasc 10 mg po daily


* Continue PT/OT for deconditioning


* Social work working on arranging placement as patient is [facility]


* He has been refusing lovenox-->we will offer SCDs


* Urine culture growing E. Coli, last dose of IV abx will be tomorrow 


* Continue current treatment plan


* LOS > 96hrs pending placement for SNF: Possible [facility] or [facility] this 

  coming week





1/17/2021


* He remains clinically stable


* Continue flomax and home dose norvasc


* Continue PT/OT for deconditioning


* Social work working on arranging placement as patient is [facility]


* DVT prophylaxis: SCDs


* Last day of intravenous antibiotic today  


* Continue current treatment plan


* LOS > 96hrs pending placement for SNF: Possible [facility] or [facility] this 

  coming week





- Plan


Plan:: 


Urinary tract infection


Volume depletion


Generalized muscle weakness


Fall as cause of accidental injury at home as place of occurrence


Unable to walk


* Continue IV Rocephin 1gm daily, last day tomorrow (4th tomorrow)


* Discontinue IV fluids 


* PT/OT for deconditioning


* CM/SW consultation


* Pain medications as needed


* Continue PRN Pyridium


* Urine cultures showing E. Coli with some resistant antibiotics, sensitive to 

  Rocephin


* Last dose of antibiotic today





Tobacco use


* Nicotine patch, refused it


* Cessation counseling 


* Offer nicotine patches on discharge





Chronic renal insufficiency, stage III (moderate)


Cerebral arterial aneurysm


Diplopia


Pituitary mass


AAA (abdominal aortic aneurysm)


COPD (chronic obstructive pulmonary disease)


Chronic back pain


HTN (hypertension), controlled 


Prostate disorder


* Continue home dose 10 mg amlodipine and 0.4 mg po Flomax daily


* Monitor vital signs





Code status: Full Code


PCP[facility]


DVT prophylaxis: SCDs-refused chemical prophylaxis


Social: Patient lives alone in a second story apartment. It does have an eleva

tor. 


Disposition: Admitted to ICU as [facility] overflow for treatment of UTI, volume 

depletion, and weakness. PT/OT recommending SNF rehab stay. Likely discharge 

next week pending placement. 





LOS >96 Hrs pending placement come next week.

## 2021-01-18 NOTE — PCM.PN
- General Info


Date of Service: 01/18/21


Admission Dx/Problem (Free Text): 


                           Admission Diagnosis/Problem





Admission Diagnosis/Problem      Fall at home








Subjective Update: 


In to see Leighton.  He reports he feels good today.  He states that he has had 

chronic constipation with difficulty in bowel movements.  He reports that he did

take a laxative and this led to a significant reduction in effort needed to have

a BM.  Otherwise has no concerns or complaints.  Reports continued difficulty 

with ambulation.  Hopeful for SNF placement in the next 24 to 48 hours.





Functional Status: Reports: Pain Controlled, Tolerating Diet, Ambulating, 

Urinating.  Denies: New Symptoms





- Review of Systems


General: Reports: No Symptoms, Weakness (improving ).  Denies: Fever, Fatigue, 

Malaise, Chills


HEENT: Reports: No Symptoms.  Denies: Headaches, Sore Throat


Pulmonary: Reports: No Symptoms.  Denies: Shortness of Breath, Cough, Sputum, 

Wheezing


Cardiovascular: Reports: No Symptoms.  Denies: Chest Pain, Palpitations, Dyspnea

on Exertion, Edema


Gastrointestinal: Reports: No Symptoms.  Denies: Abdominal Pain, Constipation, 

Diarrhea, Nausea, Vomiting


Genitourinary: Reports: Frequency (improved ).  Denies: Pain


Musculoskeletal: Reports: No Symptoms


Skin: Reports: No Symptoms.  Denies: Cyanosis


Neurological: Reports: Pre-Existing Deficit, Difficulty Walking, Weakness, Gait 

Disturbance.  Denies: Confusion, Headache, Numbness, Seizure, Syncope, Tingling


Psychiatric: Reports: No Symptoms





- Patient Data


Vitals - Most Recent: 


                                Last Vital Signs











Temp  98.2 F   01/18/21 09:21


 


Pulse  63   01/18/21 09:21


 


Resp  16   01/18/21 09:21


 


BP  129/82   01/18/21 09:33


 


Pulse Ox  97   01/18/21 09:21











Weight - Most Recent: 173 lb 3.2 oz


I&O - Last 24 Hours: 


                                 Intake & Output











 01/17/21 01/18/21 01/18/21





 22:59 06:59 14:59


 


Intake Total 1160 600 


 


Output Total 800 800 


 


Balance 360 -200 











Lab Results Last 24 Hours: 


                         Laboratory Results - last 24 hr











  01/18/21 01/18/21 Range/Units





  06:00 06:00 


 


WBC  8.74   (4.23-9.07)  K/mm3


 


RBC  4.10 L   (4.63-6.08)  M/mm3


 


Hgb  12.6 L   (13.7-17.5)  gm/dl


 


Hct  40.0 L   (40.1-51.0)  %


 


MCV  97.6 H   (79.0-92.2)  fl


 


MCH  30.7   (25.7-32.2)  pg


 


MCHC  31.5 L   (32.2-35.5)  g/dl


 


RDW Std Deviation  48.4 H   (35.1-43.9)  fL


 


Plt Count  241   (163-337)  K/mm3


 


MPV  10.2   (9.4-12.3)  fl


 


Neut % (Auto)  62.3   (34.0-67.9)  %


 


Lymph % (Auto)  23.1   (21.8-53.1)  %


 


Mono % (Auto)  8.8   (5.3-12.2)  %


 


Eos % (Auto)  4.9   (0.8-7.0)  


 


Baso % (Auto)  0.7   (0.1-1.2)  %


 


Neut # (Auto)  5.44 H   (1.78-5.38)  K/mm3


 


Lymph # (Auto)  2.02   (1.32-3.57)  K/mm3


 


Mono # (Auto)  0.77   (0.30-0.82)  K/mm3


 


Eos # (Auto)  0.43   (0.04-0.54)  K/mm3


 


Baso # (Auto)  0.06   (0.01-0.08)  K/mm3


 


Sodium   143  (136-145)  mEq/L


 


Potassium   4.2  (3.5-5.1)  mEq/L


 


Chloride   105  ()  mEq/L


 


Carbon Dioxide   30  (21-32)  mEq/L


 


Anion Gap   12.2  (5-15)  


 


BUN   32 H  (7-18)  mg/dL


 


Creatinine   1.4 H  (0.7-1.3)  mg/dL


 


Est Cr Clr Drug Dosing   42.87  mL/min


 


Estimated GFR (MDRD)   48  (>60)  mL/min


 


BUN/Creatinine Ratio   22.9 H  (14-18)  


 


Glucose   129 H  ()  mg/dL


 


Calcium   8.9  (8.5-10.1)  mg/dL


 


Magnesium   2.4  (1.8-2.4)  mg/dl


 


C-Reactive Protein   1.3 H*  (<1.0)  mg/dL











Oren Results Last 24 Hours: 


                                  Microbiology











 01/13/21 19:43 Urine Culture - Final





 Urine, Bladder    Escherichia Coli











Med Orders - Current: 


                               Current Medications





Acetaminophen (Tylenol)  650 mg PO Q4H PRN


   PRN Reason: Pain/Fever


Hydrocodone Bitart/Acetaminophen (Norco 325-5 Mg)  1 tab PO Q4H PRN


   PRN Reason: Pain (moderate 4-6)


Amlodipine Besylate (Norvasc)  10 mg PO DAILY [facility]


   Last Admin: 01/18/21 09:33 Dose:  10 mg


   Documented by: 


Cholecalciferol (Vitamin D3)  50 mcg PO DAILY [facility]


   Last Admin: 01/18/21 09:33 Dose:  50 mcg


   Documented by: 


Docusate Sodium (Colace)  100 mg PO BID PRN


   PRN Reason: Constipation


   Last Admin: 01/16/21 21:42 Dose:  100 mg


   Documented by: 


Enoxaparin Sodium (Lovenox)  40 mg SUBCUT DAILY [facility]


   Last Admin: 01/18/21 09:34 Dose:  Not Given


   Documented by: 


Miscellaneous Information (Remove Patch)  0 ea TRDERM Q24H [facility]


   Last Admin: 01/17/21 12:33 Dose:  Not Given


   Documented by: 


Nicotine (Habitrol)  14 mg TRDERM Q24H [facility]


   Last Admin: 01/17/21 12:32 Dose:  Not Given


   Documented by: 


Ondansetron HCl (Zofran Odt)  4 mg PO Q6H PRN


   PRN Reason: nausea, able to take PO


Phenazopyridine HCl (Urinary Pain Relief)  95 mg PO Q8HR PRN


   PRN Reason: frequency/urgency


Tamsulosin HCl (Flomax)  0.4 mg PO PCBREAKFAST [facility]


   Last Admin: 01/18/21 09:33 Dose:  0.4 mg


   Documented by: 





Discontinued Medications





Amlodipine Besylate (Norvasc)  5 mg PO DAILY [facility]


   Last Admin: 01/16/21 09:46 Dose:  Not Given


   Documented by: 


Furosemide (Lasix)  40 mg IVPUSH DAILY ONE


   Stop: 01/14/21 06:01


   Last Admin: 01/14/21 06:32 Dose:  40 mg


   Documented by: 


Dextrose/Sodium Chloride (Dextrose 5%-Normal Saline)  1,000 mls @ 500 mls/hr IV 

ASDIRECTED [facility]


   Last Admin: 01/13/21 19:59 Dose:  500 mls/hr


   Documented by: 


Ceftriaxone Sodium 2 gm/ (Sodium Chloride)  100 mls @ 200 mls/hr IV ONETIME ONE


   Stop: 01/13/21 21:15


   Last Admin: 01/13/21 20:57 Dose:  200 mls/hr


   Documented by: 


Dextrose/Sodium Chloride (Dextrose 5%-Normal Saline)  1,000 mls @ 100 mls/hr IV 

ASDIRECTED [facility]


   Last Admin: 01/15/21 05:09 Dose:  100 mls/hr


   Documented by: 


Ceftriaxone Sodium 1 gm/ (Sodium Chloride)  100 mls @ 200 mls/hr IV Q24H [facility]


   Stop: 01/17/21 21:01


   Last Admin: 01/17/21 22:16 Dose:  200 mls/hr


   Documented by: 


Phenazopyridine HCl (Urinary Pain Relief)  95 mg PO ONETIME ONE


   Stop: 01/14/21 21:10


Phenazopyridine HCl (Urinary Pain Relief)  95 mg PO ONETIME ONE


   Stop: 01/13/21 21:10


   Last Admin: 01/13/21 21:48 Dose:  95 mg


   Documented by: 


Tamsulosin HCl (Flomax)  0.4 mg PO ONETIME ONE


   Stop: 01/15/21 13:01


   Last Admin: 01/15/21 13:27 Dose:  0.4 mg


   Documented by: 











- Exam


Quality Assessment: DVT Prophylaxis.  No: Supplemental Oxygen


General: Alert, Oriented, Cooperative, No Acute Distress


HEENT: Mucous Membr. Moist/Pink, Other (Pitosis of left eye)


Neck: Supple, Trachea Midline


Lungs: Clear to Auscultation, Normal Respiratory Effort


Cardiovascular: Regular Rate, Regular Rhythm


GI/Abdominal Exam: Normal Bowel Sounds, Soft, Non-Tender, No Distention


 (Male) Exam: Deferred


Back Exam: Normal Inspection, Full Range of Motion


Extremities: Normal Inspection, Normal Range of Motion, Non-Tender, No Pedal 

Edema, Normal Capillary Refill


Skin: Warm, Dry, Intact


Neurological: No New Focal Deficit


Psy/Mental Status: Alert, Normal Affect, Normal Mood





Sepsis Event Note





- Evaluation


Sepsis Screening Result: No Definite Risk





- Focused Exam


Vital Signs: 


                                   Vital Signs











  Temp Pulse Resp BP Pulse Ox


 


 01/18/21 09:33     129/82 


 


 01/18/21 09:21  98.2 F  63  16  129/82  97


 


 01/18/21 03:21  97.3 F  60  20  142/72 H  95














- Problem List & Annotations


(1) Chronic renal insufficiency, stage III (moderate)


SNOMED Code(s): 510383890


   Code(s): N18.30 - CHRONIC KIDNEY DISEASE, STAGE 3 UNSPECIFIED   Status: 

Chronic   Priority: Medium   Current Visit: Yes   


Qualifiers: 


   Chronic kidney disease stage 3 subtype: stage 3a (GFR 45-59)   Qualified 

Code(s): N18.31 - Chronic kidney disease, stage 3a   





(2) Fall as cause of accidental injury at home as place of occurrence


SNOMED Code(s): 57550091


   Code(s): W19.XXXA - UNSPECIFIED FALL, INITIAL ENCOUNTER; Y92.009 - UNSP PLACE

IN UNSP NON-INSTITUT (PRIVATE) RESIDENCE AS PLACE   Status: Acute   Priority: 

High   Current Visit: Yes   


Qualifiers: 


   Encounter type: initial encounter   Qualified Code(s): W19.XXXA - Unspecified

fall, initial encounter; Y92.009 - Unspecified place in unspecified non-

institutional (private) residence as the place of occurrence of the external 

cause   





(3) Generalized muscle weakness


SNOMED Code(s): 47778546, 26940503


   Code(s): M62.81 - MUSCLE WEAKNESS (GENERALIZED)   Status: Acute   Priority: 

High   Current Visit: Yes   





(4) Unable to walk


SNOMED Code(s): 126022277


   Code(s): R26.2 - DIFFICULTY IN WALKING, NOT ELSEWHERE CLASSIFIED   Status: 

Acute   Priority: High   Current Visit: Yes   





(5) Urinary tract infection


SNOMED Code(s): 52053280


   Code(s): N39.0 - URINARY TRACT INFECTION, SITE NOT SPECIFIED   Status: Acute 

 Priority: High   Current Visit: Yes   


Qualifiers: 


   Urinary tract infection type: acute cystitis   Hematuria presence: with 

hematuria   Qualified Code(s): N30.01 - Acute cystitis with hematuria   





(6) Volume depletion


SNOMED Code(s): 694999844


   Code(s): E86.9 - VOLUME DEPLETION, UNSPECIFIED   Status: Resolved   Priority:

High   Current Visit: Yes   





(7) Cerebral arterial aneurysm


SNOMED Code(s): 537130431


   Code(s): I67.1 - CEREBRAL ANEURYSM, NONRUPTURED   Status: Chronic   Priority:

Low   Current Visit: No   





(8) Diplopia


SNOMED Code(s): 59715789


   Code(s): H53.2 - DIPLOPIA   Status: Chronic   Priority: Low   Current Visit: 

No   





(9) Pituitary mass


Status: Chronic   Priority: Low   Current Visit: No   





(10) AAA (abdominal aortic aneurysm)


SNOMED Code(s): 390400391


   Code(s): I71.4 - ABDOMINAL AORTIC ANEURYSM, WITHOUT RUPTURE   Status: Chronic

  Priority: Medium   Current Visit: No   


Qualifiers: 


   Presence of rupture: without rupture   Qualified Code(s): I71.4 - Abdominal 

aortic aneurysm, without rupture   





(11) COPD (chronic obstructive pulmonary disease)


SNOMED Code(s): 73765419


   Code(s): J44.9 - CHRONIC OBSTRUCTIVE PULMONARY DISEASE, UNSPECIFIED   Status:

Chronic   Priority: Medium   Current Visit: No   


Qualifiers: 


   COPD type: unspecified COPD   Qualified Code(s): J44.9 - Chronic obstructive 

pulmonary disease, unspecified   





(12) Chronic back pain


SNOMED Code(s): 987711649


   Code(s): M54.9 - DORSALGIA, UNSPECIFIED; G89.29 - OTHER CHRONIC PAIN   

Status: Chronic   Priority: Low   Current Visit: No   


Qualifiers: 


   Back pain location: back pain in unspecified location   Back pain laterality:

unspecified   Qualified Code(s): M54.9 - Dorsalgia, unspecified; G89.29 - Other 

chronic pain   





(13) HTN (hypertension)


SNOMED Code(s): 81945147


   Code(s): I10 - ESSENTIAL (PRIMARY) HYPERTENSION   Status: Chronic   Priority:

Medium   Current Visit: Yes   


Qualifiers: 


   Hypertension type: unspecified   Qualified Code(s): I10 - Essential (primary)

hypertension   





(14) Prostate disorder


SNOMED Code(s): 67554725


   Code(s): N42.9 - DISORDER OF PROSTATE, UNSPECIFIED   Status: Chronic   

Priority: Medium   Current Visit: Yes   





(15) Tobacco use


SNOMED Code(s): 105552254


   Code(s): Z72.0 - TOBACCO USE   Status: Chronic   Priority: Medium   Current 

Visit: Yes   





- Problem List Review


Problem List Initiated/Reviewed/Updated: Yes





- Assessment


Assessment:: 


Assessment - Day of admission - 1/14/2021 (admitted overnight)


* 84 yo brought to ED on 1/13/21 by daughter after falling the day prior 

  resulting in right hip pain


* Unsure why he fell but he woke up on ground and appeared to have fallen on 

  baseboard heater


* Continued weakness


* Reports he was on ground for approximately 6 hours and managed to crawl to 

  bedroom


* Soaked in urine after being forced to void on himself


* Denies fever, CP, SOB, Ab pain, but reports increased urinary frequency.


* Baseline diplopia, severe ptosis 2/2 bilateral 1.3cm aneurysms at the junction

  of middle cerebral and cerebellar arteries


* Hx/o HTN, AAA, COPD, chronic constipation, BPH, Prostate disorder, Recurrent 

  UTI, Chronic back pain, Pituitary tumor, tobacco use


* 12-lead EKG shows regular rhythm at 88 BPM. Unsure of origin with 

  undiscernable P-waves, Q-waves in V1-V2 and near Q-waves in V3. Irregularity 

  noted in limb leads. Mildly prolonged QTc.


* Hip x-ray shows osteopenia but no fractures


* CXR shows emphysematous change but nothing acute


* Labs:


   * WBC 13.96-->11.27


   * Hgb 13.9-->13.0


   * Plt 202-->211


   * Neutrophils 9.85-->7.87


   * Sodium 139-->141


   * Potassium 4.4-->4.0


   * Anion gap 13..4-->15.0


   * BUN 43-->40


   * Creatinine 1.4-->1.4


   * GFR 48-->48


   * Magnesium 2.3-->2.1


   * Bilirubin 0.6


   * AST 18


   * ALT 25


   * Alk Phos 64


   * CRP 8.7-->8.4


   * Albumin 3.2


   * ESR 31


   * Troponin I >0.017


   * Pro-BNP 1036


   * INR 1.11


   * Lactic acid 1.3


   * Ketones 0.2


   * UA: Cloudy, 2+ protein, 2+ occult blood, positive nitrite, 3+ leukocyte 

     esterase, 10-20 RBC, greater than 100 WBC, many bacteria


   * Urine culture pending


   * SARS-CoV-2 RNA negative


* Rocephin and IV fluids started in ED


* No CVA tenderness on exam. 


* History of E. Coli pyelonephritis with some resistant antibiotics at last 

  visit 1 year ago 


* Reports he was on BP medications but discontinued these several months ago on 

  his own


* Admitted to ICU as [facility] overflow for treatment of UTI and acute weakness





1/15/2021


* Patient reports he feels pretty good


* Frequent attempts at urination with minimal output.  Will start Flomax daily. 

  Has been on this before.


* Blood pressure up to 160s systolic.  Will start amlodipine 5mg.  Patient has 

  been on this before and stopped taking.


* PT/OT recommending SNF rehab stay


* Social work working on arranging placement as [facility] is [facility].


* Reports dizziness when rapidly moving head side to side.


* Labs:


   * WBC 8.47.


   * Hemoglobin 11.7


   * Neutrophils 5.60


   * Sodium 143


   * Potassium 3.8


   * BUN 30, creatinine 1.3, GFR 52


   * Magnesium 2.1


   * CRP 5.9


* Urine culture growing E. Coli with some resistant antibiotics


* Continue current treatment plan


* Hopeful for discharge 1/18-1/19 pending placement. 





1/16/2021


* He looks clinically stable


* Urination imp[roved with flomax, we will continue 


* Resume home dose of Norvasc 10 mg po daily


* Continue PT/OT for deconditioning


* Social work working on arranging placement as patient is [facility]


* He has been refusing lovenox-->we will offer SCDs


* Urine culture growing E. Coli, last dose of IV abx will be tomorrow 


* Continue current treatment plan


* LOS > 96hrs pending placement for SNF: Possible [facility] or [facility] this 

  coming week





1/17/2021


* He remains clinically stable


* Continue flomax and home dose norvasc


* Continue PT/OT for deconditioning


* Social work working on arranging placement as patient is [facility]


* DVT prophylaxis: SCDs


* Last day of intravenous antibiotic today  


* Continue current treatment plan


* LOS > 96hrs pending placement for SNF: Possible [facility] or [facility] this 

  coming week





1/18/2021


* Continues to be clinically stable


* Improved urination with flomax


* Continue PT/OT


* SNF placement pending


* Labs today remain stable


* Completed abx treatment


* Cleared for discharge pending placement. 


* No need for labs tomorrow. 





- Plan


Plan:: 


Urinary tract infection


Volume depletion


Generalized muscle weakness


Fall as cause of accidental injury at home as place of occurrence


Unable to walk


* Completed Rocephin 


* Discontinue IV fluids 


* PT/OT for deconditioning


* CM/SW consultation


* Pain medications as needed


* Continue PRN Pyridium


* Urine cultures showing E. Coli with some resistant antibiotics, sensitive to 

  Rocephin





Tobacco use


* Nicotine patch, refused it


* Cessation counseling 


* Offer nicotine patches on discharge





Chronic renal insufficiency, stage III (moderate)


Cerebral arterial aneurysm


Diplopia


Pituitary mass


AAA (abdominal aortic aneurysm)


COPD (chronic obstructive pulmonary disease)


Chronic back pain


HTN (hypertension), controlled 


Prostate disorder


* Continue home dose 10 mg amlodipine and 0.4 mg po Flomax daily


* Monitor vital signs





Code status: Full Code


[facility]


DVT prophylaxis: SCDs-refused chemical prophylaxis


Social: Patient lives alone in a second story apartment. It does have an 

elevator. 


Disposition: Admitted to ICU as [facility] overflow for treatment of UTI, volume 

depletion, and weakness. PT/OT recommending SNF rehab stay. Likely discharge 

next 24-48hours pending placement. 





LOS >96 Hrs pending placement come next week.

## 2021-01-19 NOTE — [id number].DCSUM1
**Discharge Summary





- Hospital Course


HPI Initial Comments: 


This is an 85-year-old male who presented to ED on 1/13/2021 accompanied by his 

daughter after a fall the day prior.  Per the note he fell around 830 or 9:00 

after getting up out of his easy chair.  He is unsure why he fell and he reports

he suddenly found himself laying with his back up against the wall.  He reports 

he landed on a baseboard heater landing on his left posterior hip and buttocks 

area.  He reports he was on the floor for about 6 hours but then managed to 

crawl to his bedroom where he was until his daughter found him.  States he was 

forced to urinate on himself but had not defecated.  Reports intermittent pain 

with voiding.  He describes it as a sharp stabbing pain.  He is unable to walk 

due to weakness.  He had a discectomy and laminectomy which he believes was at 

the L4-L5 level in 1970 in [place].  States he has severe ptosis and 

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 

arteries.  This is reportedly very rare and although he is seen neurosurgeons in

[facility] in [place] they have no definitive treatment plans for him.  They

suggested a stent but the patient declined.  He therefore will keep his left eye

closed at baseline, as if he opens that he has double vision and blurred vision.

 This leads to difficulty with ambulation and getting around his home.  He lives

in his own home alone.  Denies any current medications.  Denies any known COVID-

19 exposures.





In the ED temp is 36.4 Celsius.  Pulse 86.  Respirations 18.  Blood pressure 

143/90.  Pulse ox 98%.  Twelve-lead EKG obtained showing a regular rhythm of 

undetermined origin.  P waves are not easily discernible and there is a baseline

irregularity noted throughout the limb leads.  Rate is 88 bpm.  Q waves are 

noted in V1 and 2 with near Q waves in V3.  QT is mildly prolonged.  Labs are 

obtained with a WBC of 13.96.  Hemoglobin 13.9.  Hematocrit 42.8.  Platelets are

good at 202,000.  Neutrophils are elevated at 9.85.  Sodium is 139.  Potassium 

4.4.  Chloride 105.  Carbon oxide 25.  Anion gap 13.4.  BUN is 43.  Creatinine 

1.4.  GFR is 48.  Glucose is 110.  Calcium 9.7.  Magnesium 2.3.  Bilirubin 0.6. 

AST is 18, ALT 25, alkaline phosphatase 64.  CK is 249.  CRP is 8.7.  Protein is

7.4.  Albumin 3.2.  ESR is 31.  Troponin is less than 0.017.  proBNP is 1036.  

INR is 1.11.  Lactic acid is 1.3.  UA is grossly positive with cloudy urine, 2+ 

protein, 2+ occult blood, positive nitrate, 3+ leukocyte esterase, 10-20 RBCs, 

greater than 100 WBCs, and many bacteria noted.  Urine ketones are obtained and 

are 0.2.  SARS Covid 2 RNA is negative.  He started on D5 NS, Rocephin, and 

Pyridium.  Pelvic x-rays obtained showing osteopenia but no acute fractures.  

Chest x-ray shows nothing acute but hyperinflated lung fields are noted.  Bridge

orders are placed. Urine is sent for culture. 





He carries a history of hypertension, abdominal aortic aneurysm, COPD, chronic 

constipation, BPH, recurrent UTI, chronic back pain, pituitary tumor and aneu

rysm.  He is a full code.  [facility]. he is subsequently

admitted to the ICU as medical floor overflow for treatment of his UTI and 

weakness.





Diagnosis: Stroke: No





- Discharge Data


Discharge Date: 01/19/21 (Admit date: 1/13/21)


Discharge Disposition: DC/Tfer to SNF 03


Condition: Good





- Referral to Home Health


Primary Care Physician: 


Melanie Cao MD








- Discharge Diagnosis/Problem(s)


(1) Chronic renal insufficiency, stage III (moderate)


SNOMED Code(s): 351403199


   ICD Code: N18.30 - CHRONIC KIDNEY DISEASE, STAGE 3 UNSPECIFIED   Status: 

Chronic   Priority: Medium   


Qualifiers: 


   Chronic kidney disease stage 3 subtype: stage 3a (GFR 45-59)   Qualified 

Code(s): N18.31 - Chronic kidney disease, stage 3a   





(2) Fall as cause of accidental injury at home as place of occurrence


SNOMED Code(s): 66680612


   ICD Code: W19.XXXA - UNSPECIFIED FALL, INITIAL ENCOUNTER; Y92.009 - UNSP 

PLACE IN UNSP NON-[facility] (PRIVATE) RESIDENCE AS PLACE   Status: Acute   

Priority: High   


Qualifiers: 


   Encounter type: initial encounter   Qualified Code(s): W19.XXXA - Unspecified

fall, initial encounter; Y92.009 - Unspecified place in unspecified non-

institutional (private) residence as the place of occurrence of the external 

cause   





(3) Generalized muscle weakness


SNOMED Code(s): 21590208, 75652461


   ICD Code: M62.81 - MUSCLE WEAKNESS (GENERALIZED)   Status: Acute   Priority: 

High   





(4) Unable to walk


SNOMED Code(s): 218517579


   ICD Code: R26.2 - DIFFICULTY IN WALKING, NOT ELSEWHERE CLASSIFIED   Status: 

Acute   Priority: High   





(5) Urinary tract infection


SNOMED Code(s): 17167644


   ICD Code: N39.0 - URINARY TRACT INFECTION, SITE NOT SPECIFIED   Status: 

Resolved   Priority: High   


Qualifiers: 


   Urinary tract infection type: acute cystitis   Hematuria presence: with 

hematuria   Qualified Code(s): N30.01 - Acute cystitis with hematuria   





(6) Volume depletion


SNOMED Code(s): 755787444


   ICD Code: E86.9 - VOLUME DEPLETION, UNSPECIFIED   Status: Resolved   

Priority: High   





(7) Cerebral arterial aneurysm


SNOMED Code(s): 352857560


   ICD Code: I67.1 - CEREBRAL ANEURYSM, NONRUPTURED   Status: Chronic   

Priority: Low   





(8) Diplopia


SNOMED Code(s): 64482800


   ICD Code: H53.2 - DIPLOPIA   Status: Chronic   Priority: Low   





(9) Pituitary mass


Status: Chronic   Priority: Low   





(10) AAA (abdominal aortic aneurysm)


SNOMED Code(s): 353764020


   ICD Code: I71.4 - ABDOMINAL AORTIC ANEURYSM, WITHOUT RUPTURE   Status: 

Chronic   Priority: Medium   


Qualifiers: 


   Presence of rupture: without rupture   Qualified Code(s): I71.4 - Abdominal 

aortic aneurysm, without rupture   





(11) COPD (chronic obstructive pulmonary disease)


SNOMED Code(s): 70425881


   ICD Code: J44.9 - CHRONIC OBSTRUCTIVE PULMONARY DISEASE, UNSPECIFIED   

Status: Chronic   Priority: Medium   


Qualifiers: 


   COPD type: unspecified COPD   Qualified Code(s): J44.9 - Chronic obstructive 

pulmonary disease, unspecified   





(12) Chronic back pain


SNOMED Code(s): 035757076


   ICD Code: M54.9 - DORSALGIA, UNSPECIFIED; G89.29 - OTHER CHRONIC PAIN   

Status: Chronic   Priority: Low   


Qualifiers: 


   Back pain location: back pain in unspecified location   Back pain laterality:

unspecified   Qualified Code(s): M54.9 - Dorsalgia, unspecified; G89.29 - Other 

chronic pain   





(13) HTN (hypertension)


SNOMED Code(s): 49343884


   ICD Code: I10 - ESSENTIAL (PRIMARY) HYPERTENSION   Status: Chronic   

Priority: Medium   


Qualifiers: 


   Hypertension type: unspecified   Qualified Code(s): I10 - Essential (primary)

hypertension   





(14) Prostate disorder


SNOMED Code(s): 17362881


   ICD Code: N42.9 - DISORDER OF PROSTATE, UNSPECIFIED   Status: Chronic   

Priority: Medium   





(15) Tobacco use


SNOMED Code(s): 857528127


   ICD Code: Z72.0 - TOBACCO USE   Status: Chronic   Priority: Medium   





- Patient Summary/Data


Consults: 


                                  Consultations





01/14/21 08:06


Consult to Case Management/ [CONS] Routine 


Consult to Dietician [CONS] Routine 


Consult to Spiritual Care [CONS] Routine 


OT Evaluation and Treatment [CONS] Routine 


PT Evaluation and Treatment [CONS] Routine 











Labs Pending at D/C: 


None 





Recommended Follow-up Testing/Procedures: 


Follow-up with primary care provider within 7-10 days of discharge, sooner if 

needed.


Hospital Course: 


Leighton was admitted to the hospital after being brought in on 1/13/2021 with a 

fall resulting in right hip pain.  Patient is unsure why he fell but woke up on 

the ground after having fallen on his baseboard heater.  He reports he was able 

to crawl to his bedroom and remained there for approximately 6 hours.  He was 

forced to urinate on himself but did not defecate.  Denies any infectious 

symptoms but does note increased urine frequency.  Of note patient has baseline 

left eye diplopia and severe proptosis secondary to bilateral 1.3 cm aneurysms 

at the junction of his middle cerebral and cerebellar arteries.  Patient reports

 that he was seen by neurology in [place] for this and they wanted to place 

stents, which he refused.  In the ED x-rays were obtained showing osteopenia but

 no acute fractures.  White count was noted to be elevated and his UA was 

positive for UTI.  He was started on IV Rocephin and given IV fluids.  There is 

no CVA tenderness noted on exam.  He was admitted to the ICU as a MedSurg 

overflow.  He had been treated approximately 1 year prior for pyelonephritis 

which was secondary to E. coli with some resistant antibiotics.  He was noted to

 have elevated blood pressures in the 160s and it is noted the patient was 

started on amlodipine in the past.  Patient states that he quit taking this 

quite a while ago and has basically discontinued all medications.  This was rest

arted at 5 mg and then increased to his usual home dose of 10 mg daily.  He was 

also started on 0.4 mg daily Flomax due to his chronic urinary problems.  He is 

on home vitamin D supplementation and this was continued.  Urine culture grew E.

 coli with some sent antibiotic strains.  He completed treatment while here.  He

 did work with PT/OT who recommended SNF placement as patient has been unsteady 

and weak.  He ultimately was accepted at [facility].  As noted he 

completed treatment for his UTI.  Scription's were sent for amlodipine 10 mg and

 0.4 mg Flomax as noted prior.  Patient notes ongoing history of constipation 

and he was started on as needed Colace twice daily.  Instructed to follow-up 

with primary care provider within 7 to 10 days of discharge, sooner if needed.  

He was discharged to [facility] today.  Recommend check daily blood 

pressures and record these in a journal, bring them with to all medical 

appointments.  He is a tobacco user who refused patches while here.  We 

discussed plan for discharge and he refused any patches on discharge, nor did he

 want to see our cessation coordinator.  He was given a list of practical 

interventions and contact information for local cessation programs.








- Patient Instructions


Diet: Usual Diet as Tolerated


Activity: As Tolerated


Driving: Do Not Drive


Showering/Bathing: May Shower


Notify Provider of: Fever, Increased Pain, Nausea and/or Vomiting


Other/Special Instructions: Follow-up with primary care provider within 7-10 

days of discharge, sooner if needed.  Continue PT/OT at SNF.  Take all 

medications as prescribed.  Should symptoms return or worsen contact your 

primary care provider or return to the Emergency Department.





- Discharge Plan


*PRESCRIPTION DRUG MONITORING PROGRAM REVIEWED*: Not Applicable


*COPY OF PRESCRIPTION DRUG MONITORING REPORT IN PATIENT MEG: Not Applicable


Prescriptions/Med Rec: 


Docusate Sodium [Colace] 100 mg PO BID PRN #20 cap


 PRN Reason: Constipation


Tamsulosin [Flomax] 0.4 mg PO PCBREAKFAST #20 cap.er


Home Medications: 


                                    Home Meds





Cholecalciferol (Vitamin D3) [Vitamin D3] 2,000 unit PO DAILY 01/15/21 [History]


amLODIPine [Norvasc] 10 mg PO DAILY 01/15/21 [History]


Docusate Sodium [Colace] 100 mg PO BID PRN #20 cap 01/19/21 [Rx]


Tamsulosin [Flomax] 0.4 mg PO PCBREAKFAST #20 cap.er 01/19/21 [Rx]








Oxygen Therapy Mode: Room Air


Patient Handouts:  Sepsis, Diagnosis, Adult, Living With Heart Failure, Heart 

Failure Exacerbation, Steps to Quit Smoking


Referrals: 


Johnie Montgomery MD [Ordering Only Provider] - 01/27/21 9:00 am (Please 

follow up with Dr. Montgomery on January 27th at 9:00.)





- Discharge Summary/Plan Comment


DC Time >30 min.: Yes (45 mins )





- General Info


Date of Service: 01/19/21


Admission Dx/Problem (Free Text: 


                           Admission Diagnosis/Problem





Admission Diagnosis/Problem      Fall at home








Functional Status: Reports: Pain Controlled, Tolerating Diet, Ambulating, 

Urinating.  Denies: New Symptoms





- Review of Systems


General: Reports: Weakness.  Denies: Fever, Fatigue, Malaise, Chills


HEENT: Reports: No Symptoms.  Denies: Headaches, Sore Throat


Pulmonary: Reports: No Symptoms.  Denies: Shortness of Breath, Pleuritic Chest 

Pain, Cough, Sputum, Wheezing


Cardiovascular: Reports: No Symptoms.  Denies: Chest Pain, Palpitations, Dyspnea

on Exertion, Edema


Gastrointestinal: Reports: No Symptoms.  Denies: Abdominal Pain, Constipation, 

Diarrhea, Nausea, Vomiting


Genitourinary: Reports: No Symptoms.  Denies: Pain


Musculoskeletal: Reports: No Symptoms


Skin: Reports: No Symptoms.  Denies: Cyanosis


Neurological: Reports: Pre-Existing Deficit, Difficulty Walking, Weakness, Gait 

Disturbance.  Denies: Confusion


Psychiatric: Reports: No Symptoms





- Patient Data


Vitals - Most Recent: 


                                Last Vital Signs











Temp  98.1 F   01/19/21 03:20


 


Pulse  63   01/19/21 03:20


 


Resp  16   01/19/21 03:20


 


BP  121/82   01/19/21 03:20


 


Pulse Ox  93 L  01/19/21 03:20











Weight - Most Recent: 168 lb 4.8 oz


I&O - Last 24 hours: 


                                 Intake & Output











 01/18/21 01/19/21 01/19/21





 22:59 06:59 14:59


 


Intake Total 890 200 


 


Output Total 450 400 


 


Balance 440 -200 











Med Orders - Current: 


                               Current Medications





Acetaminophen (Tylenol)  650 mg PO Q4H PRN


   PRN Reason: Pain/Fever


Hydrocodone Bitart/Acetaminophen (Norco 325-5 Mg)  1 tab PO Q4H PRN


   PRN Reason: Pain (moderate 4-6)


Amlodipine Besylate (Norvasc)  10 mg PO DAILY [facility]


   Last Admin: 01/18/21 09:33 Dose:  10 mg


   Documented by: 


Cholecalciferol (Vitamin D3)  50 mcg PO DAILY [facility]


   Last Admin: 01/18/21 09:33 Dose:  50 mcg


   Documented by: 


Docusate Sodium (Colace)  100 mg PO BID PRN


   PRN Reason: Constipation


   Last Admin: 01/16/21 21:42 Dose:  100 mg


   Documented by: 


Enoxaparin Sodium (Lovenox)  40 mg SUBCUT DAILY [facility]


   Last Admin: 01/18/21 09:34 Dose:  Not Given


   Documented by: 


Miscellaneous Information (Remove Patch)  0 ea TRDERM Q24H [facility]


   Last Admin: 01/18/21 12:18 Dose:  Not Given


   Documented by: 


Nicotine (Habitrol)  14 mg TRDERM Q24H [facility]


   Last Admin: 01/18/21 12:18 Dose:  Not Given


   Documented by: 


Ondansetron HCl (Zofran Odt)  4 mg PO Q6H PRN


   PRN Reason: nausea, able to take PO


Phenazopyridine HCl (Urinary Pain Relief)  95 mg PO Q8HR PRN


   PRN Reason: frequency/urgency


Tamsulosin HCl (Flomax)  0.4 mg PO PCBREAKFAST [facility]


   Last Admin: 01/18/21 09:33 Dose:  0.4 mg


   Documented by: 





Discontinued Medications





Amlodipine Besylate (Norvasc)  5 mg PO DAILY [facility]


   Last Admin: 01/16/21 09:46 Dose:  Not Given


   Documented by: 


Furosemide (Lasix)  40 mg IVPUSH DAILY ONE


   Stop: 01/14/21 06:01


   Last Admin: 01/14/21 06:32 Dose:  40 mg


   Documented by: 


Dextrose/Sodium Chloride (Dextrose 5%-Normal Saline)  1,000 mls @ 500 mls/hr IV 

ASDIRECTED [facility]


   Last Admin: 01/13/21 19:59 Dose:  500 mls/hr


   Documented by: 


Ceftriaxone Sodium 2 gm/ (Sodium Chloride)  100 mls @ 200 mls/hr IV ONETIME ONE


   Stop: 01/13/21 21:15


   Last Admin: 01/13/21 20:57 Dose:  200 mls/hr


   Documented by: 


Dextrose/Sodium Chloride (Dextrose 5%-Normal Saline)  1,000 mls @ 100 mls/hr IV 

ASDIRECTED [facility]


   Last Admin: 01/15/21 05:09 Dose:  100 mls/hr


   Documented by: 


Ceftriaxone Sodium 1 gm/ (Sodium Chloride)  100 mls @ 200 mls/hr IV Q24H [facility]


   Stop: 01/17/21 21:01


   Last Admin: 01/17/21 22:16 Dose:  200 mls/hr


   Documented by: 


Phenazopyridine HCl (Urinary Pain Relief)  95 mg PO ONETIME ONE


   Stop: 01/14/21 21:10


Phenazopyridine HCl (Urinary Pain Relief)  95 mg PO ONETIME ONE


   Stop: 01/13/21 21:10


   Last Admin: 01/13/21 21:48 Dose:  95 mg


   Documented by: 


Tamsulosin HCl (Flomax)  0.4 mg PO ONETIME ONE


   Stop: 01/15/21 13:01


   Last Admin: 01/15/21 13:27 Dose:  0.4 mg


   Documented by: 











- Exam


Quality Assessment: Reports: DVT Prophylaxis


General: Reports: Alert, Oriented, Cooperative, No Acute Distress


HEENT: Reports: Mucous Membr. Moist/Pink, Other (Pitosis of left eye)


Neck: Reports: Supple, Trachea Midline


Lungs: Reports: Clear to Auscultation, Normal Respiratory Effort


Cardiovascular: Reports: Regular Rate, Tachycardia


GI/Abdominal Exam: Normal Bowel Sounds, Soft, Non-Tender, No Distention


 (Male) Exam: Deferred


Rectal (Males) Exam: Deferred


Back Exam: Reports: Normal Inspection, Full Range of Motion


Extremities: Normal Inspection, Normal Range of Motion, Non-Tender, No Pedal 

Edema, Normal Capillary Refill


Skin: Reports: Warm, Dry, Intact


Neurological: Reports: No New Focal Deficit


Psy/Mental Status: Reports: Alert, Normal Affect, Normal Mood

## 2021-11-02 NOTE — EDM.PDOC
ED HPI GENERAL MEDICAL PROBLEM





- General


Chief Complaint: Gastrointestinal Problem


Stated Complaint: ANNUAL VISIT


Time Seen by Provider: 11/02/21 12:39


Source of Information: Reports: Patient


History Limitations: Reports: No Limitations





- History of Present Illness


INITIAL COMMENTS - FREE TEXT/NARRATIVE: 





[facility].  

The patient lives at [place] and for the past week had diarrhea.  He also has 

generalized weakness.  [facility]  They sent him over

for further evaluation.  He also has a rash to the right side of his chest that 

goes to his back.  He has no fever or chills.  He has no cough, congestion or 

runny nose.  He has no nausea or vomiting.  He has no abdominal pain, chest 

pain, shortness of breath, or cough.  He has a history of abdominal aortic 

aneurysm and cerebral aneurysms.  His left eye is affected by one of the 

aneurysms.  He cannot open his eye and he has blurry and double vision when he 

does.


Onset: Gradual


Duration: Week(s):


Location: Reports: Other


Severity: Moderate


Improves with: Reports: None


Worsens with: Reports: None


Associated Symptoms: Denies: Chest Pain, Cough, Fever/Chills, Headaches, 

Nausea/Vomiting, Shortness of Breath


  ** Lower Back


Pain Score (Numeric/FACES): 2





- Related Data


                                    Allergies











Allergy/AdvReac Type Severity Reaction Status Date / Time


 


No Known Allergies Allergy   Verified 01/13/21 19:14











Home Meds: 


                                    Home Meds





Cholecalciferol (Vitamin D3) [Vitamin D3] 2,000 unit PO DAILY 01/15/21 [History]


amLODIPine [Norvasc] 10 mg PO DAILY 01/15/21 [History]


Docusate Sodium [Colace] 100 mg PO BID PRN #20 cap 01/19/21 [Rx]


Tamsulosin [Flomax] 0.4 mg PO PCBREAKFAST #20 cap.er 01/19/21 [Rx]











Past Medical History


HEENT History: Reports: Impaired Vision, Other (See Below)


Other HEENT History: Bilateral aneurysms behind eyes


Cardiovascular History: Reports: Hypertension, Other (See Below)


Other Cardiovascular History: abdominal aortic aneurysm


Respiratory History: Reports: COPD


Gastrointestinal History: Reports: Chronic Constipation, Other (See Below)


Other Gastrointestinal History: For past 1.5 years


Genitourinary History: Reports: BPH, Prostate Disorder, UTI, Recurrent


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


Musculoskeletal History: Reports: Back Pain, Chronic


Neurological History: Reports: Other (See Below)


Other Neuro History: anursym in the head-to be sent to [facility] Sept 11,2020


Psychiatric History: Reports: None


Endocrine/Metabolic History: Reports: None


Hematologic History: Reports: None


Immunologic History: Reports: None


Oncologic (Cancer) History: Reports: None


Dermatologic History: Reports: None





- Infectious Disease History


Infectious Disease History: Reports: Chicken Pox, Measles, Mumps





- Past Surgical History


Head Surgeries/Procedures: Reports: None


HEENT Surgical History: Reports: Cataract Surgery, Other (See Below)


Other HEENT Surgeries/Procedures: Bilaterally


Cardiovascular Surgical History: Reports: None


Respiratory Surgical History: Reports: None


GI Surgical History: Reports: None


Male  Surgical History: Reports: None


Musculoskeletal Surgical History: Reports: Other (See Below)


Other Musculoskeletal Surgeries/Procedures:: laminectomy





Social & Family History





- Family History


Family Medical History: No Pertinent Family History


Cardiac: Reports: MI


Musculoskeletal: Reports: Back pain, Chronic





- Tobacco Use


Tobacco Use Status *Q: Former Tobacco User


Used Tobacco, but Quit: Yes


Month/Year Tobacco Last Used: 2020





- Caffeine Use


Caffeine Use: Reports: None





- Recreational Drug Use


Recreational Drug Use: No





- Living Situation & Occupation


Living situation: Reports: Single


Occupation: Retired





ED ROS GENERAL





- Review of Systems


Review Of Systems: See Below


Constitutional: Reports: Malaise, Weakness, Fatigue.  Denies: Fever, Chills


HEENT: Reports: No Symptoms


Respiratory: Reports: No Symptoms


Cardiovascular: Reports: No Symptoms


Endocrine: Reports: No Symptoms


GI/Abdominal: Reports: Diarrhea.  Denies: Abdominal Pain, Nausea, Vomiting


: Reports: No Symptoms


Musculoskeletal: Reports: No Symptoms





ED EXAM, GI/ABD





- Physical Exam


Exam: See Below


Exam Limited By: No Limitations


General Appearance: Alert, No Apparent Distress


Ears: Normal External Exam


Nose: Normal Inspection


Head: Atraumatic, Normocephalic


Neck: Normal Inspection


Respiratory/Chest: No Respiratory Distress, Lungs Clear, Normal Breath Sounds


Cardiovascular: Regular Rate, Rhythm, No Edema, No Murmur, Other (Vesicular rash

 to the right side of his chest appears to be in a single dermatome and does not

 cross the midline of his chest.)


GI/Abdominal Exam: Soft, Non-Tender, No Organomegaly, No Mass


Back Exam: Normal Inspection


Extremities: Normal Inspection


Neurological: Alert, Oriented, No Motor/Sensory Deficits





Course





- Vital Signs


Last Recorded V/S: 


                                Last Vital Signs











Temp  96.8 F L  11/02/21 12:30


 


Pulse  90   11/02/21 12:30


 


Resp  18   11/02/21 12:30


 


BP  118/85   11/02/21 12:30


 


Pulse Ox  99   11/02/21 12:30














- Orders/Labs/Meds


Orders: 


                               Active Orders 24 hr











 Category Date Time Status


 


 Peripheral IV Care [RC] .AS DIRECTED Care  11/02/21 13:06 Active


 


 BLOOD CULTURE [MREF] Stat Lab  11/02/21 13:55 Received


 


 BLOOD CULTURE [MREF] Stat Lab  11/02/21 14:05 Received


 


 C DIFFICILE PCR W/REFLEX [MOLEC] Stat Lab  11/02/21 13:06 Ordered


 


 CULTURE URINE [MREF] Stat Lab  11/02/21 13:25 Received


 


 OVA & PARASITES BY IMMUNOASSAY [MREF] Stat Lab  11/02/21 13:08 Ordered


 


 STOOL CULTURE/SHIGA TOXIN [MREF] Stat Lab  11/02/21 13:07 Ordered


 


 Sodium Chloride 0.9% [Saline Flush] Med  11/02/21 13:05 Active





 10 ml FLUSH ASDIRECTED PRN   


 


 cefTRIAXone [Rocephin] 1 gm Med  11/02/21 15:10 Active





 Sodium Chloride 0.9% [Normal Saline AdvBag] 100 ml   





 IV ONETIME   


 


 Blood Culture x2 Reflex Set [OM.PC] Stat Oth  11/02/21 13:08 Ordered


 


 Peripheral IV Insertion Adult [OM.PC] Stat Oth  11/02/21 13:05 Ordered








                                Medication Orders





Ceftriaxone Sodium 1 gm/ (Sodium Chloride)  100 mls @ 200 mls/hr IV ONETIME ONE


   Stop: 11/02/21 15:39


Sodium Chloride (Sodium Chloride 0.9% 10 Ml Syringe)  10 ml FLUSH ASDIRECTED PRN


   PRN Reason: Keep Vein Open


   Last Admin: 11/02/21 14:05  Dose: 10 ml


   Documented by: MONICA








Labs: 


                                Laboratory Tests











  11/02/21 11/02/21 11/02/21 Range/Units





  13:25 13:30 13:55 


 


WBC    7.44  (4.23-9.07)  K/mm3


 


RBC    4.50 L  (4.63-6.08)  M/mm3


 


Hgb    13.8  (13.7-17.5)  gm/dl


 


Hct    43.5  (40.1-51.0)  %


 


MCV    96.7 H  (79.0-92.2)  fl


 


MCH    30.7  (25.7-32.2)  pg


 


MCHC    31.7 L  (32.2-35.5)  g/dl


 


RDW Std Deviation    51.2 H  (35.1-43.9)  fL


 


Plt Count    223  (163-337)  K/mm3


 


MPV    9.7  (9.4-12.3)  fl


 


Neut % (Auto)    65.2  (34.0-67.9)  %


 


Lymph % (Auto)    16.7 L  (21.8-53.1)  %


 


Mono % (Auto)    16.8 H  (5.3-12.2)  %


 


Eos % (Auto)    0.5 L  (0.8-7.0)  


 


Baso % (Auto)    0.7  (0.1-1.2)  %


 


Neut # (Auto)    4.85  (1.78-5.38)  K/mm3


 


Lymph # (Auto)    1.24 L  (1.32-3.57)  K/mm3


 


Mono # (Auto)    1.25 H  (0.30-0.82)  K/mm3


 


Eos # (Auto)    0.04  (0.04-0.54)  K/mm3


 


Baso # (Auto)    0.05  (0.01-0.08)  K/mm3


 


Sodium     (136-145)  mEq/L


 


Potassium     (3.5-5.1)  mEq/L


 


Chloride     ()  mEq/L


 


Carbon Dioxide     (21-32)  mEq/L


 


Anion Gap     (5-15)  


 


BUN     (7-18)  mg/dL


 


Creatinine     (0.7-1.3)  mg/dL


 


Est Cr Clr Drug Dosing     mL/min


 


Estimated GFR (MDRD)     (>60)  mL/min


 


BUN/Creatinine Ratio     (14-18)  


 


Glucose     (70-99)  mg/dL


 


Lactic Acid     (0.4-2.0)  mmol/L


 


Calcium     (8.5-10.1)  mg/dL


 


Magnesium     (1.8-2.4)  mg/dL


 


Total Bilirubin     (0.2-1.0)  mg/dL


 


AST     (15-37)  U/L


 


ALT     (16-63)  U/L


 


Alkaline Phosphatase     ()  U/L


 


Total Protein     (6.4-8.2)  g/dl


 


Albumin     (3.4-5.0)  g/dl


 


Globulin     gm/dL


 


Albumin/Globulin Ratio     (1-2)  


 


Urine Color   Yellow   (Yellow)  


 


Urine Appearance   Turbid H   (Clear)  


 


Urine pH   6.0   (5.0-8.0)  


 


Ur Specific Gravity   <=1.005   (1.005-1.030)  


 


Urine Protein   2+ H   (Negative)  


 


Urine Glucose (UA)   Negative   (Negative)  


 


Urine Ketones   Negative   (Negative)  


 


Urine Occult Blood   3+ H   (Negative)  


 


Urine Nitrite   Negative   (Negative)  


 


Urine Bilirubin   1+ H   (Negative)  


 


Urine Urobilinogen   0.2   (0.2-1.0)  


 


Ur Leukocyte Esterase   3+ H   (Negative)  


 


Urine RBC   0-5   (0-5)  /hpf


 


Urine WBC    H   (0-5)  /hpf


 


Ur Epithelial Cells   0-5   (0-5)  /hpf


 


Urine Bacteria   Few   (FEW)  /hpf


 


Urine Mucus   Few   (FEW)  /hpf


 


SARS-CoV-2 RNA (MADDIE)  Negative    (NEGATIVE)  














  11/02/21 11/02/21 Range/Units





  13:55 13:55 


 


WBC    (4.23-9.07)  K/mm3


 


RBC    (4.63-6.08)  M/mm3


 


Hgb    (13.7-17.5)  gm/dl


 


Hct    (40.1-51.0)  %


 


MCV    (79.0-92.2)  fl


 


MCH    (25.7-32.2)  pg


 


MCHC    (32.2-35.5)  g/dl


 


RDW Std Deviation    (35.1-43.9)  fL


 


Plt Count    (163-337)  K/mm3


 


MPV    (9.4-12.3)  fl


 


Neut % (Auto)    (34.0-67.9)  %


 


Lymph % (Auto)    (21.8-53.1)  %


 


Mono % (Auto)    (5.3-12.2)  %


 


Eos % (Auto)    (0.8-7.0)  


 


Baso % (Auto)    (0.1-1.2)  %


 


Neut # (Auto)    (1.78-5.38)  K/mm3


 


Lymph # (Auto)    (1.32-3.57)  K/mm3


 


Mono # (Auto)    (0.30-0.82)  K/mm3


 


Eos # (Auto)    (0.04-0.54)  K/mm3


 


Baso # (Auto)    (0.01-0.08)  K/mm3


 


Sodium  136   (136-145)  mEq/L


 


Potassium  4.4   (3.5-5.1)  mEq/L


 


Chloride  102   ()  mEq/L


 


Carbon Dioxide  22   (21-32)  mEq/L


 


Anion Gap  16.4 H   (5-15)  


 


BUN  42 H   (7-18)  mg/dL


 


Creatinine  2.2 H   (0.7-1.3)  mg/dL


 


Est Cr Clr Drug Dosing  24.41   mL/min


 


Estimated GFR (MDRD)  29   (>60)  mL/min


 


BUN/Creatinine Ratio  19.1 H   (14-18)  


 


Glucose  109 H   (70-99)  mg/dL


 


Lactic Acid   2.3 H*  (0.4-2.0)  mmol/L


 


Calcium  9.1   (8.5-10.1)  mg/dL


 


Magnesium  2.3   (1.8-2.4)  mg/dL


 


Total Bilirubin  0.4   (0.2-1.0)  mg/dL


 


AST  17   (15-37)  U/L


 


ALT  18   (16-63)  U/L


 


Alkaline Phosphatase  89   ()  U/L


 


Total Protein  7.4   (6.4-8.2)  g/dl


 


Albumin  3.0 L   (3.4-5.0)  g/dl


 


Globulin  4.4   gm/dL


 


Albumin/Globulin Ratio  0.7 L   (1-2)  


 


Urine Color    (Yellow)  


 


Urine Appearance    (Clear)  


 


Urine pH    (5.0-8.0)  


 


Ur Specific Gravity    (1.005-1.030)  


 


Urine Protein    (Negative)  


 


Urine Glucose (UA)    (Negative)  


 


Urine Ketones    (Negative)  


 


Urine Occult Blood    (Negative)  


 


Urine Nitrite    (Negative)  


 


Urine Bilirubin    (Negative)  


 


Urine Urobilinogen    (0.2-1.0)  


 


Ur Leukocyte Esterase    (Negative)  


 


Urine RBC    (0-5)  /hpf


 


Urine WBC    (0-5)  /hpf


 


Ur Epithelial Cells    (0-5)  /hpf


 


Urine Bacteria    (FEW)  /hpf


 


Urine Mucus    (FEW)  /hpf


 


SARS-CoV-2 RNA (MADDIE)    (NEGATIVE)  











Meds: 


Medications











Generic Name Dose Route Start Last Admin





  Trade Name Freq  PRN Reason Stop Dose Admin


 


Ceftriaxone Sodium 1 gm/  100 mls @ 200 mls/hr  11/02/21 15:10 





  Sodium Chloride  IV  11/02/21 15:39 





  ONETIME ONE  


 


Sodium Chloride  10 ml  11/02/21 13:05  11/02/21 14:05





  Sodium Chloride 0.9% 10 Ml Syringe  FLUSH   10 ml





  ASDIRECTED PRN   Administration





  Keep Vein Open  














Discontinued Medications














Generic Name Dose Route Start Last Admin





  Trade Name Lewisq  PRN Reason Stop Dose Admin


 


Sodium Chloride  1,000 mls @ 1,000 mls/hr  11/02/21 13:05  11/02/21 14:14





  Normal Saline  IV  11/02/21 14:04  1,000 mls/hr





  .BOLUS STA   Administration


 


Valacyclovir HCl  1,000 mg  11/02/21 15:10 





  Valacyclovir 1,000 Mg Tab  PO  11/02/21 15:11 





  ONETIME ONE  














- Re-Assessments/Exams


Free Text/Narrative Re-Assessment/Exam: 





11/02/21 15:07


I ordered an IV NS 1L bolus, labs, blood cultures, lactic acid, UA and COVID 19.

  His CBC looks good.  His creatinine is elevated at 2.2.  His UA shows a UTI.  

I have obtained a urine culture.  He is COVID negative.  I will ordered rocephin

 1 gram IV and I will also get him some valacyclivir.  He was unable to give us 

a stool sample yet.





Departure





- Departure


Time of Disposition: 15:20


Disposition: Refer to Observation


Condition: Fair


Clinical Impression: 


 UTI, Urinary tract infectious disease, Generalized weakness





Shingles


Qualifiers:


 Herpes zoster complications: unspecified herpes zoster complication Qualified 

Code(s): B02.8 - Zoster with other complications





Chronic renal insufficiency, stage III (moderate)


Qualifiers:


 Chronic kidney disease stage 3 subtype: stage 3a (GFR 45-59) Qualified Code(s):

 N18.31 - Chronic kidney disease, stage 3a








- Discharge Information


Referrals: 


Melanie Cao MD [Primary Care Provider] - 


Forms:  ED Department Discharge





Sepsis Event Note (ED)





- Evaluation


Sepsis Screening Result: No Definite Risk





- Focused Exam


Vital Signs: 


                                   Vital Signs











  Temp Pulse Resp BP Pulse Ox


 


 11/02/21 12:30  96.8 F L  90  18  118/85  99














- My Orders


Last 24 Hours: 


My Active Orders





11/02/21 13:05


Sodium Chloride 0.9% [Saline Flush]   10 ml FLUSH ASDIRECTED PRN 


Peripheral IV Insertion Adult [OM.PC] Stat 





11/02/21 13:06


Peripheral IV Care [RC] .AS DIRECTED 


C DIFFICILE PCR W/REFLEX [MOLEC] Stat 





11/02/21 13:07


STOOL CULTURE/SHIGA TOXIN [MREF] Stat 





11/02/21 13:08


OVA & PARASITES BY IMMUNOASSAY [MREF] Stat 


Blood Culture x2 Reflex Set [OM.PC] Stat 





11/02/21 13:25


CULTURE URINE [MREF] Stat 





11/02/21 13:55


BLOOD CULTURE [MREF] Stat 





11/02/21 14:05


BLOOD CULTURE [MREF] Stat 





11/02/21 15:10


cefTRIAXone [Rocephin] 1 gm   Sodium Chloride 0.9% [Normal Saline AdvBag] 100 ml

IV ONETIME 














- Assessment/Plan


Last 24 Hours: 


My Active Orders





11/02/21 13:05


Sodium Chloride 0.9% [Saline Flush]   10 ml FLUSH ASDIRECTED PRN 


Peripheral IV Insertion Adult [OM.PC] Stat 





11/02/21 13:06


Peripheral IV Care [RC] .AS DIRECTED 


C DIFFICILE PCR W/REFLEX [MOLEC] Stat 





11/02/21 13:07


STOOL CULTURE/SHIGA TOXIN [MREF] Stat 





11/02/21 13:08


OVA & PARASITES BY IMMUNOASSAY [MREF] Stat 


Blood Culture x2 Reflex Set [OM.PC] Stat 





11/02/21 13:25


CULTURE URINE [MREF] Stat 





11/02/21 13:55


BLOOD CULTURE [MREF] Stat 





11/02/21 14:05


BLOOD CULTURE [MREF] Stat 





11/02/21 15:10


cefTRIAXone [Rocephin] 1 gm   Sodium Chloride 0.9% [Normal Saline AdvBag] 100 ml

IV ONETIME

## 2021-11-02 NOTE — PCM.HP.2
H&P History of Present Illness





- General


Date of Service: 11/02/21


Admit Problem/Dx: 


                           Admission Diagnosis/Problem





Admission Diagnosis/Problem      Urinary tract infection











- History of Present Illness


Initial Comments - Free Text/Narative: 





85[facility] has had diarrhea for approximately 1 week.  Patient is a poor 

historian, therefore history was obtained through patient himself, emergency 

department notes, and [facility] notes.  [facility] who told her that they were unable to care for him anymore at this 

time.  There was suggestion that he would need nursing home type options.  

Because of his diarrhea the provider felt he needed to go to the emergency 

department for "possible hospitalization until either he is strong enough to go 

back to assisted living or more appropriate housing is found."  Also there were 

several notes per [facility] stating that he has increased 

problem with transferring due to pain.  He apparently has not taken any meds for

approximately 1 week.





Also, patient was found to have a rash on the right side of his back.  Is been 

there for at least a couple of days but could be longer.  He states he did not 

know about the rash until today, but it is painful.  He was also found to have a

UTI in the emergency department and given 1 g Rocephin.





Patient also has a history of abdominal aortic aneurysm and cerebral aneurysms. 

It has left him with ptosis of the left eye and asymmetry of the pupils with the

left eye being dilated.


  ** Lower Back


Pain Score (Numeric/FACES): 2





- Related Data


Allergies/Adverse Reactions: 


                                    Allergies











Allergy/AdvReac Type Severity Reaction Status Date / Time


 


No Known Allergies Allergy   Verified 11/02/21 17:44











Home Medications: 


                                    Home Meds





Cholecalciferol (Vitamin D3) [Vitamin D3] 2,000 unit PO DAILY 01/15/21 [History]


Docusate Sodium [Colace] 100 mg PO BID PRN #20 cap 01/19/21 [Rx]


Tamsulosin [Flomax] 0.4 mg PO PCBREAKFAST #20 cap.er 01/19/21 [Rx]


Acetaminophen [8 Hour Pain Relief] 650 mg PO ASDIRECTED PRN 11/02/21 [History]


Ciprofloxacin HCl [Cipro] 250 mg PO BID 11/02/21 [History]


Ferrous Sulfate [Iron] 325 mg PO DAILY 11/02/21 [History]


Sennosides/Docusate Sodium [Senna Plus 8.6-50 mg Tablet] 2 tab PO ASDIRECTED 11/ 02/21 [History]


Sennosides/Docusate Sodium [Senna Plus 8.6-50 mg Tablet] 2 tab PO BID PRN 

11/02/21 [History]


Sulfamethoxazole/Trimethoprim [Sulfamethoxazole-Tmp Ds Tablet] 1 tab PO DAILY 

11/02/21 [History]











Past Medical History


HEENT History: Reports: Impaired Vision, Other (See Below)


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


Cardiovascular History: Reports: Hypertension, Other (See Below)


Other Cardiovascular History: abdominal aortic aneurysm


Respiratory History: Reports: COPD


Gastrointestinal History: Reports: Chronic Constipation, Other (See Below)


Other Gastrointestinal History: For past 1.5 years


Genitourinary History: Reports: BPH, Prostate Disorder, UTI, Recurrent


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

states "made hole in bladder-had mixed feces and urine." pt states then it 

"released itself" confirmed by radiology.


Musculoskeletal History: Reports: Back Pain, Chronic


Neurological History: Reports: Other (See Below)


Other Neuro History: anursym in the head-to be sent to [facility] Sept 11,2020


Psychiatric History: Reports: None


Endocrine/Metabolic History: Reports: None


Hematologic History: Reports: None


Immunologic History: Reports: None


Oncologic (Cancer) History: Reports: None


Dermatologic History: Reports: Other (See Below)


Other Dermatologic History: currently has shingles





- Infectious Disease History


Infectious Disease History: Reports: Chicken Pox, Measles, Mumps, Shingles


Other Infectious Disease History: currently has shingles





- Past Surgical History


Head Surgeries/Procedures: Reports: None


HEENT Surgical History: Reports: Cataract Surgery, Other (See Below)


Other HEENT Surgeries/Procedures: Bilaterally


Cardiovascular Surgical History: Reports: None


Respiratory Surgical History: Reports: None


GI Surgical History: Reports: None


Male  Surgical History: Reports: None


Musculoskeletal Surgical History: Reports: Other (See Below)


Other Musculoskeletal Surgeries/Procedures:: laminectomy





Social & Family History





- Family History


Family Medical History: No Pertinent Family History


Cardiac: Reports: MI


Musculoskeletal: Reports: Back pain, Chronic





- Tobacco Use


Tobacco Use Status *Q: Current Every Day Tobacco User


Years of Tobacco use: 60


Packs/Tins Daily: 1


Used Tobacco, but Quit: No


Month/Year Tobacco Last Used: 2020





- Caffeine Use


Caffeine Use: Reports: Coffee, Soda





- Recreational Drug Use


Recreational Drug Use: No





- Living Situation & Occupation


Living situation: Reports: Single


Occupation: Retired





H&P Review of Systems





- Review of Systems:


Review Of Systems: Unable To Obtain


Reason Not Obtained: Patient was mainly rambling and a poor historian





Exam





- Exam


Exam: See Below





- Vital Signs


Vital Signs: 


                                Last Vital Signs











Temp  96.8 F L  11/02/21 12:30


 


Pulse  90   11/02/21 12:30


 


Resp  18   11/02/21 12:30


 


BP  118/85   11/02/21 12:30


 


Pulse Ox  99   11/02/21 12:30











Weight: 150 lb 8 oz





- Exam


Quality Assessment: No: Supplemental Oxygen


General: Alert.  No: Oriented


HEENT: Conjunctiva Clear, Hearing Intact, Mucosa Moist & Pink, Normal Nasal 

Septum, Abnormal Pupils (Left pupil dilated).  No: PERRLA


Neck: Supple, Trachea Midline, 2


Lungs: Clear to Auscultation, Normal Respiratory Effort


Cardiovascular: Regular Rate, Regular Rhythm


GI/Abdominal Exam: Normal Bowel Sounds, Soft, Non-Tender, No Organomegaly, No 

Distention, No Abnormal Bruit, No Mass


Extremities: Normal Inspection, Normal Range of Motion, Non-Tender, No Pedal 

Edema, Normal Capillary Refill


Skin: Rash (Herpetic rash on the right upper back)


Neuro Extensive - Mental Status: Alert, Other (Difficult to get a good history 

from secondary to rambling and wandering nature of his speech.)


Psychiatric: Alert





- Patient Data


Lab Results Last 24 hrs: 


                         Laboratory Results - last 24 hr











  11/02/21 11/02/21 11/02/21 Range/Units





  13:25 13:30 13:55 


 


WBC    7.44  (4.23-9.07)  K/mm3


 


RBC    4.50 L  (4.63-6.08)  M/mm3


 


Hgb    13.8  (13.7-17.5)  gm/dl


 


Hct    43.5  (40.1-51.0)  %


 


MCV    96.7 H  (79.0-92.2)  fl


 


MCH    30.7  (25.7-32.2)  pg


 


MCHC    31.7 L  (32.2-35.5)  g/dl


 


RDW Std Deviation    51.2 H  (35.1-43.9)  fL


 


Plt Count    223  (163-337)  K/mm3


 


MPV    9.7  (9.4-12.3)  fl


 


Neut % (Auto)    65.2  (34.0-67.9)  %


 


Lymph % (Auto)    16.7 L  (21.8-53.1)  %


 


Mono % (Auto)    16.8 H  (5.3-12.2)  %


 


Eos % (Auto)    0.5 L  (0.8-7.0)  


 


Baso % (Auto)    0.7  (0.1-1.2)  %


 


Neut # (Auto)    4.85  (1.78-5.38)  K/mm3


 


Lymph # (Auto)    1.24 L  (1.32-3.57)  K/mm3


 


Mono # (Auto)    1.25 H  (0.30-0.82)  K/mm3


 


Eos # (Auto)    0.04  (0.04-0.54)  K/mm3


 


Baso # (Auto)    0.05  (0.01-0.08)  K/mm3


 


Manual Slide Review    Abnormal smear  


 


Sodium     (136-145)  mEq/L


 


Potassium     (3.5-5.1)  mEq/L


 


Chloride     ()  mEq/L


 


Carbon Dioxide     (21-32)  mEq/L


 


Anion Gap     (5-15)  


 


BUN     (7-18)  mg/dL


 


Creatinine     (0.7-1.3)  mg/dL


 


Est Cr Clr Drug Dosing     mL/min


 


Estimated GFR (MDRD)     (>60)  mL/min


 


BUN/Creatinine Ratio     (14-18)  


 


Glucose     (70-99)  mg/dL


 


Lactic Acid     (0.4-2.0)  mmol/L


 


Calcium     (8.5-10.1)  mg/dL


 


Magnesium     (1.8-2.4)  mg/dL


 


Total Bilirubin     (0.2-1.0)  mg/dL


 


AST     (15-37)  U/L


 


ALT     (16-63)  U/L


 


Alkaline Phosphatase     ()  U/L


 


Total Protein     (6.4-8.2)  g/dl


 


Albumin     (3.4-5.0)  g/dl


 


Globulin     gm/dL


 


Albumin/Globulin Ratio     (1-2)  


 


Urine Color   Yellow   (Yellow)  


 


Urine Appearance   Turbid H   (Clear)  


 


Urine pH   6.0   (5.0-8.0)  


 


Ur Specific Gravity   <=1.005   (1.005-1.030)  


 


Urine Protein   2+ H   (Negative)  


 


Urine Glucose (UA)   Negative   (Negative)  


 


Urine Ketones   Negative   (Negative)  


 


Urine Occult Blood   3+ H   (Negative)  


 


Urine Nitrite   Negative   (Negative)  


 


Urine Bilirubin   1+ H   (Negative)  


 


Urine Urobilinogen   0.2   (0.2-1.0)  


 


Ur Leukocyte Esterase   3+ H   (Negative)  


 


Urine RBC   0-5   (0-5)  /hpf


 


Urine WBC    H   (0-5)  /hpf


 


Ur Epithelial Cells   0-5   (0-5)  /hpf


 


Urine Bacteria   Few   (FEW)  /hpf


 


Urine Mucus   Few   (FEW)  /hpf


 


SARS-CoV-2 RNA (MADDIE)  Negative    (NEGATIVE)  














  11/02/21 11/02/21 Range/Units





  13:55 13:55 


 


WBC    (4.23-9.07)  K/mm3


 


RBC    (4.63-6.08)  M/mm3


 


Hgb    (13.7-17.5)  gm/dl


 


Hct    (40.1-51.0)  %


 


MCV    (79.0-92.2)  fl


 


MCH    (25.7-32.2)  pg


 


MCHC    (32.2-35.5)  g/dl


 


RDW Std Deviation    (35.1-43.9)  fL


 


Plt Count    (163-337)  K/mm3


 


MPV    (9.4-12.3)  fl


 


Neut % (Auto)    (34.0-67.9)  %


 


Lymph % (Auto)    (21.8-53.1)  %


 


Mono % (Auto)    (5.3-12.2)  %


 


Eos % (Auto)    (0.8-7.0)  


 


Baso % (Auto)    (0.1-1.2)  %


 


Neut # (Auto)    (1.78-5.38)  K/mm3


 


Lymph # (Auto)    (1.32-3.57)  K/mm3


 


Mono # (Auto)    (0.30-0.82)  K/mm3


 


Eos # (Auto)    (0.04-0.54)  K/mm3


 


Baso # (Auto)    (0.01-0.08)  K/mm3


 


Manual Slide Review    


 


Sodium  136   (136-145)  mEq/L


 


Potassium  4.4   (3.5-5.1)  mEq/L


 


Chloride  102   ()  mEq/L


 


Carbon Dioxide  22   (21-32)  mEq/L


 


Anion Gap  16.4 H   (5-15)  


 


BUN  42 H   (7-18)  mg/dL


 


Creatinine  2.2 H   (0.7-1.3)  mg/dL


 


Est Cr Clr Drug Dosing  24.41   mL/min


 


Estimated GFR (MDRD)  29   (>60)  mL/min


 


BUN/Creatinine Ratio  19.1 H   (14-18)  


 


Glucose  109 H   (70-99)  mg/dL


 


Lactic Acid   2.3 H*  (0.4-2.0)  mmol/L


 


Calcium  9.1   (8.5-10.1)  mg/dL


 


Magnesium  2.3   (1.8-2.4)  mg/dL


 


Total Bilirubin  0.4   (0.2-1.0)  mg/dL


 


AST  17   (15-37)  U/L


 


ALT  18   (16-63)  U/L


 


Alkaline Phosphatase  89   ()  U/L


 


Total Protein  7.4   (6.4-8.2)  g/dl


 


Albumin  3.0 L   (3.4-5.0)  g/dl


 


Globulin  4.4   gm/dL


 


Albumin/Globulin Ratio  0.7 L   (1-2)  


 


Urine Color    (Yellow)  


 


Urine Appearance    (Clear)  


 


Urine pH    (5.0-8.0)  


 


Ur Specific Gravity    (1.005-1.030)  


 


Urine Protein    (Negative)  


 


Urine Glucose (UA)    (Negative)  


 


Urine Ketones    (Negative)  


 


Urine Occult Blood    (Negative)  


 


Urine Nitrite    (Negative)  


 


Urine Bilirubin    (Negative)  


 


Urine Urobilinogen    (0.2-1.0)  


 


Ur Leukocyte Esterase    (Negative)  


 


Urine RBC    (0-5)  /hpf


 


Urine WBC    (0-5)  /hpf


 


Ur Epithelial Cells    (0-5)  /hpf


 


Urine Bacteria    (FEW)  /hpf


 


Urine Mucus    (FEW)  /hpf


 


SARS-CoV-2 RNA (MADDIE)    (NEGATIVE)  











Result Diagrams: 


                                 11/02/21 13:55





                                 11/02/21 13:55





Sepsis Event Note





- Evaluation


Sepsis Screening Result: No Definite Risk





- Focused Exam


Vital Signs: 


                                   Vital Signs











  Temp Pulse Resp BP Pulse Ox


 


 11/02/21 12:30  96.8 F L  90  18  118/85  99














- Problem List


(1) Generalized weakness


SNOMED Code(s): 40718552


   ICD Code: R53.1 - WEAKNESS   Status: Acute   Current Visit: Yes   





(2) Shingles


SNOMED Code(s): 8851610


   ICD Code: B02.9 - ZOSTER WITHOUT COMPLICATIONS   Status: Acute   Current 

Visit: Yes   


Qualifiers: 


   Herpes zoster complications: unspecified herpes zoster complication   

Qualified Code(s): B02.8 - Zoster with other complications   





(3) UTI, Urinary tract infectious disease


SNOMED Code(s): 22894468


   ICD Code: N39.0 - URINARY TRACT INFECTION, SITE NOT SPECIFIED   Status: Acute

  Current Visit: Yes   





(4) Acute renal injury


SNOMED Code(s): 31478158, 38990936


   ICD Code: N17.9 - ACUTE KIDNEY FAILURE, UNSPECIFIED   Status: Acute   

Priority: High   Current Visit: No   





(5) Elevated lactic acid level


SNOMED Code(s): 8102195


   ICD Code: R79.89 - OTHER SPECIFIED ABNORMAL FINDINGS OF BLOOD CHEMISTRY   

Status: Acute   Priority: High   Current Visit: No   


Problem List Initiated/Reviewed/Updated: Yes


Orders Last 24hrs: 


                               Active Orders 24 hr











 Category Date Time Status


 


 Admission Status [Patient Status] [ADT] Routine ADT  11/02/21 15:38 Active


 


 Oxygen Therapy [RC] PRN Care  11/02/21 17:33 Active


 


 Up With Assistance [RC] ASDIRECTED Care  11/02/21 17:33 Active


 


 VTE/DVT Education [RC] PER UNIT ROUTINE Care  11/02/21 17:33 Active


 


 Vital Signs [RC] Q4HR Care  11/02/21 17:33 Active


 


 PT Evaluation and Treatment [CONS] Routine Cons  11/02/21 17:35 Active


 


 Regular Diet [DIET] Diet  11/02/21 Dinner Active


 


 BLOOD CULTURE [MREF] Stat Lab  11/02/21 13:55 Received


 


 BLOOD CULTURE [MREF] Stat Lab  11/02/21 14:05 Received


 


 C DIFFICILE PCR W/REFLEX [MOLEC] Stat Lab  11/02/21 13:06 Ordered


 


 CULTURE URINE [MREF] Stat Lab  11/02/21 13:25 Received


 


 LACTIC ACID [CHEM] Stat Lab  11/02/21 17:27 Received


 


 OVA & PARASITES BY IMMUNOASSAY [MREF] Stat Lab  11/02/21 13:08 Ordered


 


 STOOL CULTURE/SHIGA TOXIN [MREF] Stat Lab  11/02/21 13:07 Ordered


 


 Acetaminophen [TylenoL] Med  11/02/21 17:33 Ordered





 650 mg PO Q4H PRN   


 


 Enoxaparin [Lovenox] Med  11/03/21 09:00 Ordered





 40 mg SUBCUT DAILY   


 


 Ondansetron [Zofran] Med  11/02/21 17:33 Ordered





 4 mg IV Q6H PRN   


 


 Sodium Chloride 0.9% [Saline Flush] Med  11/02/21 13:05 Active





 10 ml FLUSH ASDIRECTED PRN   


 


 cefTRIAXone [Rocephin] 1 gm Med  11/03/21 15:00 Ordered





 Sodium Chloride 0.9% [Normal Saline AdvBag] 100 ml   





 IV Q24H   


 


 Blood Culture x2 Reflex Set [OM.PC] Stat Oth  11/02/21 13:08 Ordered


 


 Peripheral IV Insertion Adult [OM.PC] Stat Oth  11/02/21 13:05 Ordered


 


 Resuscitation Status Routine Resus Stat  11/02/21 17:33 Ordered








                                Medication Orders





Acetaminophen (Acetaminophen 325 Mg Tab)  650 mg PO Q4H PRN


   PRN Reason: Pain (Mild 1-3)/fever


Enoxaparin Sodium (Enoxaparin 40 Mg/0.4 Ml Syringe)  40 mg SUBCUT DAILY CHE


Ceftriaxone Sodium 1 gm/ (Sodium Chloride)  100 mls @ 200 mls/hr IV Q24H CHE


Ondansetron HCl (Ondansetron 4 Mg/2 Ml Sdv)  4 mg IV Q6H PRN


   PRN Reason: Nausea/Vomiting


Sodium Chloride (Sodium Chloride 0.9% 10 Ml Syringe)  10 ml FLUSH ASDIRECTED PRN


   PRN Reason: Keep Vein Open


   Last Admin: 11/02/21 14:05  Dose: 10 ml


   Documented by: MONICA








Assessment/Plan Comment:: 





85-year-old male from [facility] presents with weakness and 

diarrhea for approximately 1 week.





Diarrhea


Patient is increasing diarrhea and weakness make him a poor candidate to stay at

 [facility].  Although I do not see any recent antibiotic usage 

the [facility] and the emergency room doc recommended C. difficile screening.  

Stool culture was ordered in the emergency department





Acute on chronic, stage III, renal disease


Creatinine is increased to 2.2 and estimated GFR is worse at 29.  Our most 

recent hospitalization showed on January 18, 2021 creatinine of 1.4 and an 

estimated GFR of 48.  This is likely secondary to prerenal disease from 

hypovolemia due to diarrhea.





Lactic acidosis


Initial lactic acid of 2.3.  This is likely secondary to hypovolemia and poor 

tissue perfusion.  He was given 1000 mL of normal saline in the emergency 

department.





UTI


UA showed  WBCs with only 0-5 epithelial cells.  Cultures were obtained in

 the emergency department and given ceftriaxone 1 g.





Shingles right upper back


Unknown how long this has been present.  Rash appears to be at least a few days 

old.  Does not appear that antiviral treatment will be of any benefit at this 

time.  According [facility] from the [facility] he was complaining of back pain at 

today's visit, but also he stated that this has been going on since 1956 and has

 not changed.  He declined any further evaluation or treatment.





Active problems: Intracranial aneurysm, he BPH, hematuria, diverticular disease 

of colon, abdominal aortic aneurysm of 3.6 cm, hypertension, elevated PSA, low 

back pain.





Plan


* Refer for observation


* Continue IV hydration


* Get stool for C. difficile, ova parasite, and culture


* Follow lactic acid until less than 2


* Rocephin 1 g daily awaiting urine culture and blood cultures.


* Symptomatic care for shingles.  Place in isolation.


* Continue home medication of amlodipine for hypertension


* Continue tamsulosin.





VTE prophylaxis with Lovenox


CODE STATUS: Full code





- Mortality Measure


Prognosis:: Good

## 2021-11-03 NOTE — PCM.PN
- General Info


Date of Service: 11/03/21


Admission Dx/Problem (Free Text): 


                           Admission Diagnosis/Problem





Admission Diagnosis/Problem      Urinary tract infection








Functional Status: Reports: Pain Controlled, Tolerating Diet (diet improving ), 

Ambulating, Urinating





- Review of Systems


General: Reports: Weakness (improving ), Fatigue, Malaise.  Denies: Fever, 

Chills


HEENT: Reports: No Symptoms.  Denies: Headaches, Sore Throat, Visual Changes


Pulmonary: Reports: No Symptoms.  Denies: Shortness of Breath, Cough, Sputum, 

Wheezing


Cardiovascular: Reports: No Symptoms.  Denies: Chest Pain, Palpitations, Dyspnea

on Exertion, Edema


Gastrointestinal: Denies: Abdominal Pain, Constipation, Decreased Appetite 

(improved today ), Diarrhea (none today), Nausea, Vomiting


Genitourinary: Reports: No Symptoms.  Denies: Pain


Musculoskeletal: Reports: No Symptoms


Skin: Reports: No Symptoms


Neurological: Reports: No Symptoms


Psychiatric: Reports: No Symptoms





- Patient Data


Vitals - Most Recent: 


                                Last Vital Signs











Temp  97.9 F   11/03/21 07:41


 


Pulse  74   11/03/21 07:41


 


Resp  20   11/03/21 07:41


 


BP  123/89   11/03/21 07:41


 


Pulse Ox  97   11/03/21 07:41











Weight - Most Recent: 142 lb 9.6 oz


I&O - Last 24 Hours: 


                                 Intake & Output











 11/02/21 11/03/21 11/03/21





 22:59 06:59 14:59


 


Intake Total  1298 


 


Output Total 50 350 


 


Balance -50 948 











Lab Results Last 24 Hours: 


                         Laboratory Results - last 24 hr











  11/02/21 11/02/21 11/02/21 Range/Units





  13:25 13:30 13:55 


 


WBC    7.44  (4.23-9.07)  K/mm3


 


RBC    4.50 L  (4.63-6.08)  M/mm3


 


Hgb    13.8  (13.7-17.5)  gm/dl


 


Hct    43.5  (40.1-51.0)  %


 


MCV    96.7 H  (79.0-92.2)  fl


 


MCH    30.7  (25.7-32.2)  pg


 


MCHC    31.7 L  (32.2-35.5)  g/dl


 


RDW Std Deviation    51.2 H  (35.1-43.9)  fL


 


Plt Count    223  (163-337)  K/mm3


 


MPV    9.7  (9.4-12.3)  fl


 


Neut % (Auto)    65.2  (34.0-67.9)  %


 


Lymph % (Auto)    16.7 L  (21.8-53.1)  %


 


Mono % (Auto)    16.8 H  (5.3-12.2)  %


 


Eos % (Auto)    0.5 L  (0.8-7.0)  


 


Baso % (Auto)    0.7  (0.1-1.2)  %


 


Neut # (Auto)    4.85  (1.78-5.38)  K/mm3


 


Lymph # (Auto)    1.24 L  (1.32-3.57)  K/mm3


 


Mono # (Auto)    1.25 H  (0.30-0.82)  K/mm3


 


Eos # (Auto)    0.04  (0.04-0.54)  K/mm3


 


Baso # (Auto)    0.05  (0.01-0.08)  K/mm3


 


Manual Slide Review    Abnormal smear  


 


Sodium     (136-145)  mEq/L


 


Potassium     (3.5-5.1)  mEq/L


 


Chloride     ()  mEq/L


 


Carbon Dioxide     (21-32)  mEq/L


 


Anion Gap     (5-15)  


 


BUN     (7-18)  mg/dL


 


Creatinine     (0.7-1.3)  mg/dL


 


Est Cr Clr Drug Dosing     mL/min


 


Estimated GFR (MDRD)     (>60)  mL/min


 


BUN/Creatinine Ratio     (14-18)  


 


Glucose     (70-99)  mg/dL


 


Lactic Acid     (0.4-2.0)  mmol/L


 


Calcium     (8.5-10.1)  mg/dL


 


Magnesium     (1.8-2.4)  mg/dL


 


Total Bilirubin     (0.2-1.0)  mg/dL


 


AST     (15-37)  U/L


 


ALT     (16-63)  U/L


 


Alkaline Phosphatase     ()  U/L


 


Total Protein     (6.4-8.2)  g/dl


 


Albumin     (3.4-5.0)  g/dl


 


Globulin     gm/dL


 


Albumin/Globulin Ratio     (1-2)  


 


Urine Color   Yellow   (Yellow)  


 


Urine Appearance   Turbid H   (Clear)  


 


Urine pH   6.0   (5.0-8.0)  


 


Ur Specific Gravity   <=1.005   (1.005-1.030)  


 


Urine Protein   2+ H   (Negative)  


 


Urine Glucose (UA)   Negative   (Negative)  


 


Urine Ketones   Negative   (Negative)  


 


Urine Occult Blood   3+ H   (Negative)  


 


Urine Nitrite   Negative   (Negative)  


 


Urine Bilirubin   1+ H   (Negative)  


 


Urine Urobilinogen   0.2   (0.2-1.0)  


 


Ur Leukocyte Esterase   3+ H   (Negative)  


 


Urine RBC   0-5   (0-5)  /hpf


 


Urine WBC    H   (0-5)  /hpf


 


Ur Epithelial Cells   0-5   (0-5)  /hpf


 


Urine Bacteria   Few   (FEW)  /hpf


 


Urine Mucus   Few   (FEW)  /hpf


 


SARS-CoV-2 RNA (MADDIE)  Negative    (NEGATIVE)  


 


MRSA (PCR)     














  11/02/21 11/02/21 11/02/21 Range/Units





  13:55 13:55 17:27 


 


WBC     (4.23-9.07)  K/mm3


 


RBC     (4.63-6.08)  M/mm3


 


Hgb     (13.7-17.5)  gm/dl


 


Hct     (40.1-51.0)  %


 


MCV     (79.0-92.2)  fl


 


MCH     (25.7-32.2)  pg


 


MCHC     (32.2-35.5)  g/dl


 


RDW Std Deviation     (35.1-43.9)  fL


 


Plt Count     (163-337)  K/mm3


 


MPV     (9.4-12.3)  fl


 


Neut % (Auto)     (34.0-67.9)  %


 


Lymph % (Auto)     (21.8-53.1)  %


 


Mono % (Auto)     (5.3-12.2)  %


 


Eos % (Auto)     (0.8-7.0)  


 


Baso % (Auto)     (0.1-1.2)  %


 


Neut # (Auto)     (1.78-5.38)  K/mm3


 


Lymph # (Auto)     (1.32-3.57)  K/mm3


 


Mono # (Auto)     (0.30-0.82)  K/mm3


 


Eos # (Auto)     (0.04-0.54)  K/mm3


 


Baso # (Auto)     (0.01-0.08)  K/mm3


 


Manual Slide Review     


 


Sodium  136    (136-145)  mEq/L


 


Potassium  4.4    (3.5-5.1)  mEq/L


 


Chloride  102    ()  mEq/L


 


Carbon Dioxide  22    (21-32)  mEq/L


 


Anion Gap  16.4 H    (5-15)  


 


BUN  42 H    (7-18)  mg/dL


 


Creatinine  2.2 H    (0.7-1.3)  mg/dL


 


Est Cr Clr Drug Dosing  24.41    mL/min


 


Estimated GFR (MDRD)  29    (>60)  mL/min


 


BUN/Creatinine Ratio  19.1 H    (14-18)  


 


Glucose  109 H    (70-99)  mg/dL


 


Lactic Acid   2.3 H*  1.7  (0.4-2.0)  mmol/L


 


Calcium  9.1    (8.5-10.1)  mg/dL


 


Magnesium  2.3    (1.8-2.4)  mg/dL


 


Total Bilirubin  0.4    (0.2-1.0)  mg/dL


 


AST  17    (15-37)  U/L


 


ALT  18    (16-63)  U/L


 


Alkaline Phosphatase  89    ()  U/L


 


Total Protein  7.4    (6.4-8.2)  g/dl


 


Albumin  3.0 L    (3.4-5.0)  g/dl


 


Globulin  4.4    gm/dL


 


Albumin/Globulin Ratio  0.7 L    (1-2)  


 


Urine Color     (Yellow)  


 


Urine Appearance     (Clear)  


 


Urine pH     (5.0-8.0)  


 


Ur Specific Gravity     (1.005-1.030)  


 


Urine Protein     (Negative)  


 


Urine Glucose (UA)     (Negative)  


 


Urine Ketones     (Negative)  


 


Urine Occult Blood     (Negative)  


 


Urine Nitrite     (Negative)  


 


Urine Bilirubin     (Negative)  


 


Urine Urobilinogen     (0.2-1.0)  


 


Ur Leukocyte Esterase     (Negative)  


 


Urine RBC     (0-5)  /hpf


 


Urine WBC     (0-5)  /hpf


 


Ur Epithelial Cells     (0-5)  /hpf


 


Urine Bacteria     (FEW)  /hpf


 


Urine Mucus     (FEW)  /hpf


 


SARS-CoV-2 RNA (MADDIE)     (NEGATIVE)  


 


MRSA (PCR)     














  11/02/21 11/03/21 11/03/21 Range/Units





  18:55 05:04 05:04 


 


WBC   5.78   (4.23-9.07)  K/mm3


 


RBC   4.07 L   (4.63-6.08)  M/mm3


 


Hgb   12.5 L   (13.7-17.5)  gm/dl


 


Hct   39.5 L   (40.1-51.0)  %


 


MCV   97.1 H   (79.0-92.2)  fl


 


MCH   30.7   (25.7-32.2)  pg


 


MCHC   31.6 L   (32.2-35.5)  g/dl


 


RDW Std Deviation   50.6 H   (35.1-43.9)  fL


 


Plt Count   215   (163-337)  K/mm3


 


MPV   9.9   (9.4-12.3)  fl


 


Neut % (Auto)   48.0   (34.0-67.9)  %


 


Lymph % (Auto)   31.0   (21.8-53.1)  %


 


Mono % (Auto)   18.7 H   (5.3-12.2)  %


 


Eos % (Auto)   1.4   (0.8-7.0)  


 


Baso % (Auto)   0.7   (0.1-1.2)  %


 


Neut # (Auto)   2.78   (1.78-5.38)  K/mm3


 


Lymph # (Auto)   1.79   (1.32-3.57)  K/mm3


 


Mono # (Auto)   1.08 H   (0.30-0.82)  K/mm3


 


Eos # (Auto)   0.08   (0.04-0.54)  K/mm3


 


Baso # (Auto)   0.04   (0.01-0.08)  K/mm3


 


Manual Slide Review   Abnormal smear   


 


Sodium    139  (136-145)  mEq/L


 


Potassium    4.5  (3.5-5.1)  mEq/L


 


Chloride    107  ()  mEq/L


 


Carbon Dioxide    22  (21-32)  mEq/L


 


Anion Gap    14.5  (5-15)  


 


BUN    37 H  (7-18)  mg/dL


 


Creatinine    2.0 H  (0.7-1.3)  mg/dL


 


Est Cr Clr Drug Dosing    31.40  mL/min


 


Estimated GFR (MDRD)    32  (>60)  mL/min


 


BUN/Creatinine Ratio    18.5 H  (14-18)  


 


Glucose    105 H  (70-99)  mg/dL


 


Lactic Acid     (0.4-2.0)  mmol/L


 


Calcium    8.3 L  (8.5-10.1)  mg/dL


 


Magnesium    2.1  (1.8-2.4)  mg/dL


 


Total Bilirubin    0.3  (0.2-1.0)  mg/dL


 


AST    13 L  (15-37)  U/L


 


ALT    16  (16-63)  U/L


 


Alkaline Phosphatase    76  ()  U/L


 


Total Protein    5.9 L  (6.4-8.2)  g/dl


 


Albumin    2.6 L  (3.4-5.0)  g/dl


 


Globulin    3.3  gm/dL


 


Albumin/Globulin Ratio    0.8 L  (1-2)  


 


Urine Color     (Yellow)  


 


Urine Appearance     (Clear)  


 


Urine pH     (5.0-8.0)  


 


Ur Specific Gravity     (1.005-1.030)  


 


Urine Protein     (Negative)  


 


Urine Glucose (UA)     (Negative)  


 


Urine Ketones     (Negative)  


 


Urine Occult Blood     (Negative)  


 


Urine Nitrite     (Negative)  


 


Urine Bilirubin     (Negative)  


 


Urine Urobilinogen     (0.2-1.0)  


 


Ur Leukocyte Esterase     (Negative)  


 


Urine RBC     (0-5)  /hpf


 


Urine WBC     (0-5)  /hpf


 


Ur Epithelial Cells     (0-5)  /hpf


 


Urine Bacteria     (FEW)  /hpf


 


Urine Mucus     (FEW)  /hpf


 


SARS-CoV-2 RNA (MADDIE)     (NEGATIVE)  


 


MRSA (PCR)  Negative    











Med Orders - Current: 


                               Current Medications





Acetaminophen (Acetaminophen 325 Mg Tab)  650 mg PO Q4H PRN


   PRN Reason: Pain (Mild 1-3)/fever


Enoxaparin Sodium (Enoxaparin 30 Mg/0.3 Ml Syringe)  30 mg SUBCUT DAILY [facility]


   Last Admin: 11/03/21 09:04 Dose:  Not Given


   Documented by: 


Ceftriaxone Sodium 1 gm/ (Sodium Chloride)  100 mls @ 200 mls/hr IV Q24H [facility]


Sodium Chloride (Normal Saline)  1,000 mls @ 75 mls/hr IV ASDIRECTED [facility]


   Last Admin: 11/03/21 09:01 Dose:  75 mls/hr


   Documented by: 


Ondansetron HCl (Ondansetron 4 Mg/2 Ml Sdv)  4 mg IV Q6H PRN


   PRN Reason: Nausea/Vomiting


Senna/Docusate Sodium (Docusate Sodium/Sennosides 50-8.6 Mg Tab)  1 tab PO BID 

PRN


   PRN Reason: Constipation


Sodium Chloride (Sodium Chloride 0.9% 10 Ml Syringe)  10 ml FLUSH ASDIRECTED PRN


   PRN Reason: Keep Vein Open


   Last Admin: 11/02/21 14:05 Dose:  10 ml


   Documented by: 


Tamsulosin HCl (Tamsulosin 0.4 Mg Cap.Er)  0.4 mg PO BEDTIME [facility]


   Last Admin: 11/02/21 21:30 Dose:  0.4 mg


   Documented by: 





Discontinued Medications





Sodium Chloride (Normal Saline)  1,000 mls @ 1,000 mls/hr IV .BOLUS STA


   Stop: 11/02/21 14:04


   Last Admin: 11/02/21 14:14 Dose:  1,000 mls/hr


   Documented by: 


Ceftriaxone Sodium 1 gm/ (Sodium Chloride)  100 mls @ 200 mls/hr IV ONETIME ONE


   Stop: 11/02/21 15:39


   Last Admin: 11/02/21 15:43 Dose:  200 mls/hr


   Documented by: 


Valacyclovir HCl (Valacyclovir 1,000 Mg Tab)  1,000 mg PO ONETIME ONE


   Stop: 11/02/21 15:11


   Last Admin: 11/02/21 15:50 Dose:  1,000 mg


   Documented by: 











- Exam


Quality Assessment: DVT Prophylaxis.  No: Supplemental Oxygen, Urine Catheter


General: Alert, Oriented, Cooperative, No Acute Distress


HEENT: Mucous Membr. Moist/Pink, Other (Severe proptosis of the left eyelid with

dilated pupil).  No: Pupils Equal, Pupils Reactive


Neck: Supple, Trachea Midline


Lungs: Clear to Auscultation, Normal Respiratory Effort


Cardiovascular: Regular Rate, Regular Rhythm


GI/Abdominal Exam: Normal Bowel Sounds, Soft, Non-Tender, No Distention


 (Male) Exam: Deferred


Back Exam: Normal Inspection, Full Range of Motion, Paraspinal Tenderness, 

Vertebral Tenderness (Lower lumbar), Other (Herpetic rash on right upper back ).

 No: CVA Tenderness (L), CVA Tenderness (R)


Extremities: Normal Inspection, Normal Range of Motion, Non-Tender, No Pedal 

Edema


Skin: Warm, Dry, Intact


Neurological: No New Focal Deficit


Psy/Mental Status: Alert, Normal Affect, Normal Mood





- Patient Data


Lab Results Last 24 hrs: 


                         Laboratory Results - last 24 hr











  11/02/21 11/02/21 11/02/21 Range/Units





  13:25 13:30 13:55 


 


WBC    7.44  (4.23-9.07)  K/mm3


 


RBC    4.50 L  (4.63-6.08)  M/mm3


 


Hgb    13.8  (13.7-17.5)  gm/dl


 


Hct    43.5  (40.1-51.0)  %


 


MCV    96.7 H  (79.0-92.2)  fl


 


MCH    30.7  (25.7-32.2)  pg


 


MCHC    31.7 L  (32.2-35.5)  g/dl


 


RDW Std Deviation    51.2 H  (35.1-43.9)  fL


 


Plt Count    223  (163-337)  K/mm3


 


MPV    9.7  (9.4-12.3)  fl


 


Neut % (Auto)    65.2  (34.0-67.9)  %


 


Lymph % (Auto)    16.7 L  (21.8-53.1)  %


 


Mono % (Auto)    16.8 H  (5.3-12.2)  %


 


Eos % (Auto)    0.5 L  (0.8-7.0)  


 


Baso % (Auto)    0.7  (0.1-1.2)  %


 


Neut # (Auto)    4.85  (1.78-5.38)  K/mm3


 


Lymph # (Auto)    1.24 L  (1.32-3.57)  K/mm3


 


Mono # (Auto)    1.25 H  (0.30-0.82)  K/mm3


 


Eos # (Auto)    0.04  (0.04-0.54)  K/mm3


 


Baso # (Auto)    0.05  (0.01-0.08)  K/mm3


 


Manual Slide Review    Abnormal smear  


 


Sodium     (136-145)  mEq/L


 


Potassium     (3.5-5.1)  mEq/L


 


Chloride     ()  mEq/L


 


Carbon Dioxide     (21-32)  mEq/L


 


Anion Gap     (5-15)  


 


BUN     (7-18)  mg/dL


 


Creatinine     (0.7-1.3)  mg/dL


 


Est Cr Clr Drug Dosing     mL/min


 


Estimated GFR (MDRD)     (>60)  mL/min


 


BUN/Creatinine Ratio     (14-18)  


 


Glucose     (70-99)  mg/dL


 


Lactic Acid     (0.4-2.0)  mmol/L


 


Calcium     (8.5-10.1)  mg/dL


 


Magnesium     (1.8-2.4)  mg/dL


 


Total Bilirubin     (0.2-1.0)  mg/dL


 


AST     (15-37)  U/L


 


ALT     (16-63)  U/L


 


Alkaline Phosphatase     ()  U/L


 


Total Protein     (6.4-8.2)  g/dl


 


Albumin     (3.4-5.0)  g/dl


 


Globulin     gm/dL


 


Albumin/Globulin Ratio     (1-2)  


 


Urine Color   Yellow   (Yellow)  


 


Urine Appearance   Turbid H   (Clear)  


 


Urine pH   6.0   (5.0-8.0)  


 


Ur Specific Gravity   <=1.005   (1.005-1.030)  


 


Urine Protein   2+ H   (Negative)  


 


Urine Glucose (UA)   Negative   (Negative)  


 


Urine Ketones   Negative   (Negative)  


 


Urine Occult Blood   3+ H   (Negative)  


 


Urine Nitrite   Negative   (Negative)  


 


Urine Bilirubin   1+ H   (Negative)  


 


Urine Urobilinogen   0.2   (0.2-1.0)  


 


Ur Leukocyte Esterase   3+ H   (Negative)  


 


Urine RBC   0-5   (0-5)  /hpf


 


Urine WBC    H   (0-5)  /hpf


 


Ur Epithelial Cells   0-5   (0-5)  /hpf


 


Urine Bacteria   Few   (FEW)  /hpf


 


Urine Mucus   Few   (FEW)  /hpf


 


SARS-CoV-2 RNA (MADDIE)  Negative    (NEGATIVE)  


 


MRSA (PCR)     














  11/02/21 11/02/21 11/02/21 Range/Units





  13:55 13:55 17:27 


 


WBC     (4.23-9.07)  K/mm3


 


RBC     (4.63-6.08)  M/mm3


 


Hgb     (13.7-17.5)  gm/dl


 


Hct     (40.1-51.0)  %


 


MCV     (79.0-92.2)  fl


 


MCH     (25.7-32.2)  pg


 


MCHC     (32.2-35.5)  g/dl


 


RDW Std Deviation     (35.1-43.9)  fL


 


Plt Count     (163-337)  K/mm3


 


MPV     (9.4-12.3)  fl


 


Neut % (Auto)     (34.0-67.9)  %


 


Lymph % (Auto)     (21.8-53.1)  %


 


Mono % (Auto)     (5.3-12.2)  %


 


Eos % (Auto)     (0.8-7.0)  


 


Baso % (Auto)     (0.1-1.2)  %


 


Neut # (Auto)     (1.78-5.38)  K/mm3


 


Lymph # (Auto)     (1.32-3.57)  K/mm3


 


Mono # (Auto)     (0.30-0.82)  K/mm3


 


Eos # (Auto)     (0.04-0.54)  K/mm3


 


Baso # (Auto)     (0.01-0.08)  K/mm3


 


Manual Slide Review     


 


Sodium  136    (136-145)  mEq/L


 


Potassium  4.4    (3.5-5.1)  mEq/L


 


Chloride  102    ()  mEq/L


 


Carbon Dioxide  22    (21-32)  mEq/L


 


Anion Gap  16.4 H    (5-15)  


 


BUN  42 H    (7-18)  mg/dL


 


Creatinine  2.2 H    (0.7-1.3)  mg/dL


 


Est Cr Clr Drug Dosing  24.41    mL/min


 


Estimated GFR (MDRD)  29    (>60)  mL/min


 


BUN/Creatinine Ratio  19.1 H    (14-18)  


 


Glucose  109 H    (70-99)  mg/dL


 


Lactic Acid   2.3 H*  1.7  (0.4-2.0)  mmol/L


 


Calcium  9.1    (8.5-10.1)  mg/dL


 


Magnesium  2.3    (1.8-2.4)  mg/dL


 


Total Bilirubin  0.4    (0.2-1.0)  mg/dL


 


AST  17    (15-37)  U/L


 


ALT  18    (16-63)  U/L


 


Alkaline Phosphatase  89    ()  U/L


 


Total Protein  7.4    (6.4-8.2)  g/dl


 


Albumin  3.0 L    (3.4-5.0)  g/dl


 


Globulin  4.4    gm/dL


 


Albumin/Globulin Ratio  0.7 L    (1-2)  


 


Urine Color     (Yellow)  


 


Urine Appearance     (Clear)  


 


Urine pH     (5.0-8.0)  


 


Ur Specific Gravity     (1.005-1.030)  


 


Urine Protein     (Negative)  


 


Urine Glucose (UA)     (Negative)  


 


Urine Ketones     (Negative)  


 


Urine Occult Blood     (Negative)  


 


Urine Nitrite     (Negative)  


 


Urine Bilirubin     (Negative)  


 


Urine Urobilinogen     (0.2-1.0)  


 


Ur Leukocyte Esterase     (Negative)  


 


Urine RBC     (0-5)  /hpf


 


Urine WBC     (0-5)  /hpf


 


Ur Epithelial Cells     (0-5)  /hpf


 


Urine Bacteria     (FEW)  /hpf


 


Urine Mucus     (FEW)  /hpf


 


SARS-CoV-2 RNA (MADDIE)     (NEGATIVE)  


 


MRSA (PCR)     














  11/02/21 11/03/21 11/03/21 Range/Units





  18:55 05:04 05:04 


 


WBC   5.78   (4.23-9.07)  K/mm3


 


RBC   4.07 L   (4.63-6.08)  M/mm3


 


Hgb   12.5 L   (13.7-17.5)  gm/dl


 


Hct   39.5 L   (40.1-51.0)  %


 


MCV   97.1 H   (79.0-92.2)  fl


 


MCH   30.7   (25.7-32.2)  pg


 


MCHC   31.6 L   (32.2-35.5)  g/dl


 


RDW Std Deviation   50.6 H   (35.1-43.9)  fL


 


Plt Count   215   (163-337)  K/mm3


 


MPV   9.9   (9.4-12.3)  fl


 


Neut % (Auto)   48.0   (34.0-67.9)  %


 


Lymph % (Auto)   31.0   (21.8-53.1)  %


 


Mono % (Auto)   18.7 H   (5.3-12.2)  %


 


Eos % (Auto)   1.4   (0.8-7.0)  


 


Baso % (Auto)   0.7   (0.1-1.2)  %


 


Neut # (Auto)   2.78   (1.78-5.38)  K/mm3


 


Lymph # (Auto)   1.79   (1.32-3.57)  K/mm3


 


Mono # (Auto)   1.08 H   (0.30-0.82)  K/mm3


 


Eos # (Auto)   0.08   (0.04-0.54)  K/mm3


 


Baso # (Auto)   0.04   (0.01-0.08)  K/mm3


 


Manual Slide Review   Abnormal smear   


 


Sodium    139  (136-145)  mEq/L


 


Potassium    4.5  (3.5-5.1)  mEq/L


 


Chloride    107  ()  mEq/L


 


Carbon Dioxide    22  (21-32)  mEq/L


 


Anion Gap    14.5  (5-15)  


 


BUN    37 H  (7-18)  mg/dL


 


Creatinine    2.0 H  (0.7-1.3)  mg/dL


 


Est Cr Clr Drug Dosing    31.40  mL/min


 


Estimated GFR (MDRD)    32  (>60)  mL/min


 


BUN/Creatinine Ratio    18.5 H  (14-18)  


 


Glucose    105 H  (70-99)  mg/dL


 


Lactic Acid     (0.4-2.0)  mmol/L


 


Calcium    8.3 L  (8.5-10.1)  mg/dL


 


Magnesium    2.1  (1.8-2.4)  mg/dL


 


Total Bilirubin    0.3  (0.2-1.0)  mg/dL


 


AST    13 L  (15-37)  U/L


 


ALT    16  (16-63)  U/L


 


Alkaline Phosphatase    76  ()  U/L


 


Total Protein    5.9 L  (6.4-8.2)  g/dl


 


Albumin    2.6 L  (3.4-5.0)  g/dl


 


Globulin    3.3  gm/dL


 


Albumin/Globulin Ratio    0.8 L  (1-2)  


 


Urine Color     (Yellow)  


 


Urine Appearance     (Clear)  


 


Urine pH     (5.0-8.0)  


 


Ur Specific Gravity     (1.005-1.030)  


 


Urine Protein     (Negative)  


 


Urine Glucose (UA)     (Negative)  


 


Urine Ketones     (Negative)  


 


Urine Occult Blood     (Negative)  


 


Urine Nitrite     (Negative)  


 


Urine Bilirubin     (Negative)  


 


Urine Urobilinogen     (0.2-1.0)  


 


Ur Leukocyte Esterase     (Negative)  


 


Urine RBC     (0-5)  /hpf


 


Urine WBC     (0-5)  /hpf


 


Ur Epithelial Cells     (0-5)  /hpf


 


Urine Bacteria     (FEW)  /hpf


 


Urine Mucus     (FEW)  /hpf


 


SARS-CoV-2 RNA ([facility])     (NEGATIVE)  


 


MRSA (PCR)  Negative    











Result Diagrams: 


                                 11/03/21 05:04





                                 11/03/21 05:04





Sepsis Event Note





- Evaluation


Sepsis Screening Result: No Definite Risk





- Focused Exam


Vital Signs: 


                                   Vital Signs











  Temp Pulse Resp BP Pulse Ox


 


 11/03/21 07:41  97.9 F  74  20  123/89  97


 


 11/03/21 04:19  98.1 F  68  20  104/70  97


 


 11/03/21 00:11  97.2 F  78  27 H  89/66 L  94 L














- Problem List & Annotations


(1) Generalized weakness


SNOMED Code(s): 68820424


   Code(s): R53.1 - WEAKNESS   Status: Acute   Priority: High   Current Visit: 

Yes   





(2) Shingles


SNOMED Code(s): 9311002


   Code(s): B02.9 - ZOSTER WITHOUT COMPLICATIONS   Status: Acute   Priority: 

Medium   Current Visit: Yes   


Qualifiers: 


   Herpes zoster complications: unspecified herpes zoster complication   

Qualified Code(s): B02.8 - Zoster with other complications   





(3) UTI, Urinary tract infectious disease


SNOMED Code(s): 02689964


   Code(s): N39.0 - URINARY TRACT INFECTION, SITE NOT SPECIFIED   Status: Acute 

 Priority: High   Current Visit: Yes   





(4) Chronic renal insufficiency, stage III (moderate)


SNOMED Code(s): 751277550


   Code(s): N18.30 - CHRONIC KIDNEY DISEASE, STAGE 3 UNSPECIFIED   Status: 

Chronic   Priority: Medium   Current Visit: Yes   


Qualifiers: 


   Chronic kidney disease stage 3 subtype: stage 3a (GFR 45-59)   Qualified 

Code(s): N18.31 - Chronic kidney disease, stage 3a   





(5) Elevated lactic acid level


SNOMED Code(s): 5856984


   Code(s): R79.89 - OTHER SPECIFIED ABNORMAL FINDINGS OF BLOOD CHEMISTRY   

Status: Resolved   Priority: High   Current Visit: Yes   





(6) AAA (abdominal aortic aneurysm)


SNOMED Code(s): 336423247


   Code(s): I71.4 - ABDOMINAL AORTIC ANEURYSM, WITHOUT RUPTURE   Status: Chronic

  Priority: Medium   Current Visit: No   


Qualifiers: 


   Presence of rupture: without rupture   Qualified Code(s): I71.4 - Abdominal 

aortic aneurysm, without rupture   





(7) COPD (chronic obstructive pulmonary disease)


SNOMED Code(s): 27078381


   Code(s): J44.9 - CHRONIC OBSTRUCTIVE PULMONARY DISEASE, UNSPECIFIED   Status:

Chronic   Priority: Medium   Current Visit: No   


Qualifiers: 


   COPD type: unspecified COPD   Qualified Code(s): J44.9 - Chronic obstructive 

pulmonary disease, unspecified   





(8) Cerebral arterial aneurysm


SNOMED Code(s): 045042774


   Code(s): I67.1 - CEREBRAL ANEURYSM, NONRUPTURED   Status: Chronic   Priority:

Low   Current Visit: No   





(9) HTN (hypertension)


SNOMED Code(s): 73081793


   Code(s): I10 - ESSENTIAL (PRIMARY) HYPERTENSION   Status: Chronic   Priority:

Medium   Current Visit: No   


Qualifiers: 


   Hypertension type: unspecified   Qualified Code(s): I10 - Essential (primary)

hypertension   





(10) Prostate disorder


SNOMED Code(s): 35886879


   Code(s): N42.9 - DISORDER OF PROSTATE, UNSPECIFIED   Status: Chronic   

Priority: Medium   Current Visit: No   





(11) Recurrent UTI


SNOMED Code(s): 526545768


   Code(s): N39.0 - URINARY TRACT INFECTION, SITE NOT SPECIFIED   Status: 

Chronic   Priority: High   Current Visit: Yes   





(12) Diarrhea


SNOMED Code(s): 54894398


   Code(s): R19.7 - DIARRHEA, UNSPECIFIED   Status: Acute   Priority: High   

Current Visit: Yes   


Qualifiers: 


   Diarrhea type: unspecified type   Qualified Code(s): R19.7 - Diarrhea, 

unspecified   





(13) Acute renal failure


SNOMED Code(s): 51622773


   Code(s): N17.9 - ACUTE KIDNEY FAILURE, UNSPECIFIED   Status: Acute   

Priority: High   Current Visit: Yes   


Qualifiers: 


   Acute renal failure type: unspecified   Qualified Code(s): N17.9 - Acute 

kidney failure, unspecified   





- Problem List Review


Problem List Initiated/Reviewed/Updated: Yes





- Plan


Plan:: 


85-year-old male from [place] assisted [facility] presents with weakness and balaji

rrhea for approximately 1 week.





Diarrhea, Improved


Canceled C. diff testing


Continue O&P and stool culture ordered by ED





Acute on chronic, stage III, renal disease


Creatinine is increased to 2.2 and estimated GFR is worse at 29 on admission.  

Our most recent hospitalization showed on January 18, 2021 creatinine of 1.4 and

an estimated GFR of 48.  Today creatinine is 2.0 and GFR is 32. This is likely 

secondary to prerenal disease from hypovolemia due to diarrhea.





Lactic acidosis, resolved 


Initial lactic acid of 2.3 in ED.  This is likely secondary to hypovolemia and 

poor tissue perfusion.  He was given 1000 mL of normal saline in the emergency 

department. Resolved on floor.





UTI


UA showed  WBCs with only 0-5 epithelial cells.  Urine and blood cultures 

were obtained in the emergency department and given ceftriaxone 1 g. History of 

E. Coli UTIs. Nursing reports gross pus in urine. 





Shingles right upper back


Unknown how long this has been present.  Rash appears to be at least a few days 

old but no one knows for sure. [facility] was 

complaining of back pain at today's visit, but also he stated that this has been

going on since 1956 and has not changed. Valtrex 1gm started in ED.





Active problems: Intracranial aneurysm, he BPH, hematuria, diverticular disease 

of colon, abdominal aortic aneurysm of 3.6 cm, hypertension, elevated PSA, low 

back pain.





Plan


* Inpatient 


* Continue IV hydration


* Discontinue c. diff stool study


* Continue O&P and stool culture


* Fecal lactoferrin ordered


* Rocephin 1 g daily awaiting urine culture and blood cultures.


* Symptomatic care for shingles.  Place in isolation - droplet and contact.


* Continue home medication of amlodipine for hypertension


* Continue tamsulosin.


* IV fluids as ordered 


* Daily labs


* PT/OT


* CM/SW consult as [facility] if reportedly refusing patient return 


* Continue Valtrex 1gm Q12hr due to poor renal function





VTE prophylaxis with Lovenox


CODE STATUS: Full code


[facility]P: [facility]

## 2021-11-04 NOTE — PCM.PN
- General Info


Date of Service: 11/04/21


Admission Dx/Problem (Free Text): 


                           Admission Diagnosis/Problem





Admission Diagnosis/Problem      Urinary tract infection








Functional Status: Reports: Pain Controlled, Tolerating Diet, Ambulating, 

Urinating.  Denies: New Symptoms





- Review of Systems


General: Reports: No Symptoms.  Denies: Fever, Weakness, Fatigue, Malaise, 

Chills


HEENT: Reports: Other (Chronic difficulty seeing through left eye).  Denies: 

Headaches, Sore Throat, Visual Changes


Pulmonary: Reports: No Symptoms.  Denies: Shortness of Breath, Cough, Sputum, 

Wheezing


Cardiovascular: Reports: No Symptoms.  Denies: Chest Pain, Palpitations, Dyspnea

on Exertion, Edema


Gastrointestinal: Reports: No Symptoms, Other (Last BM was yesterday AM).  

Denies: Abdominal Pain, Constipation, Diarrhea, Nausea, Vomiting


Genitourinary: Reports: No Symptoms.  Denies: Pain


Musculoskeletal: Reports: No Symptoms


Skin: Reports: No Symptoms.  Denies: Cyanosis


Neurological: Reports: No Symptoms.  Denies: Confusion, Dizziness, Headache, 

Numbness, Pre-Existing Deficit, Seizure, Syncope, Tingling, Difficulty Walking, 

Weakness, Gait Disturbance


Psychiatric: Reports: No Symptoms





- Patient Data


Vitals - Most Recent: 


                                Last Vital Signs











Temp  97.3 F   11/04/21 03:41


 


Pulse  65   11/04/21 03:41


 


Resp  16   11/04/21 03:41


 


BP  108/56 L  11/04/21 03:41


 


Pulse Ox  96   11/04/21 03:41











Weight - Most Recent: 140 lb 3.2 oz


I&O - Last 24 Hours: 


                                 Intake & Output











 11/03/21 11/04/21 11/04/21





 22:59 06:59 14:59


 


Intake Total 1340 400 


 


Output Total 850 350 


 


Balance 490 50 











Lab Results Last 24 Hours: 


                         Laboratory Results - last 24 hr











  11/03/21 11/04/21 11/04/21 Range/Units





  05:04 05:32 05:32 


 


WBC   5.51   (4.23-9.07)  K/mm3


 


RBC   3.68 L   (4.63-6.08)  M/mm3


 


Hgb   11.4 L   (13.7-17.5)  gm/dl


 


Hct   36.0 L   (40.1-51.0)  %


 


MCV   97.8 H   (79.0-92.2)  fl


 


MCH   31.0   (25.7-32.2)  pg


 


MCHC   31.7 L   (32.2-35.5)  g/dl


 


RDW Std Deviation   49.9 H   (35.1-43.9)  fL


 


Plt Count   180   (163-337)  K/mm3


 


MPV   9.4   (9.4-12.3)  fl


 


Neut % (Auto)   51.4   (34.0-67.9)  %


 


Lymph % (Auto)   32.1   (21.8-53.1)  %


 


Mono % (Auto)   11.4   (5.3-12.2)  %


 


Eos % (Auto)   3.8   (0.8-7.0)  


 


Baso % (Auto)   1.1   (0.1-1.2)  %


 


Neut # (Auto)   2.83   (1.78-5.38)  K/mm3


 


Lymph # (Auto)   1.77   (1.32-3.57)  K/mm3


 


Mono # (Auto)   0.63   (0.30-0.82)  K/mm3


 


Eos # (Auto)   0.21   (0.04-0.54)  K/mm3


 


Baso # (Auto)   0.06   (0.01-0.08)  K/mm3


 


Manual Slide Review  Abnormal smear    


 


Sodium    141  (136-145)  mEq/L


 


Potassium    4.4  (3.5-5.1)  mEq/L


 


Chloride    111 H  ()  mEq/L


 


Carbon Dioxide    23  (21-32)  mEq/L


 


Anion Gap    11.4  (5-15)  


 


BUN    25 H  (7-18)  mg/dL


 


Creatinine    1.7 H  (0.7-1.3)  mg/dL


 


Est Cr Clr Drug Dosing    28.94  mL/min


 


Estimated GFR (MDRD)    38  (>60)  mL/min


 


BUN/Creatinine Ratio    14.7  (14-18)  


 


Glucose    100 H  (70-99)  mg/dL


 


Calcium    8.2 L  (8.5-10.1)  mg/dL


 


Magnesium    2.0  (1.8-2.4)  mg/dL


 


C-Reactive Protein    2.4 H*  (<1.0)  mg/dL











Med Orders - Current: 


                               Current Medications





Acetaminophen (Acetaminophen 325 Mg Tab)  650 mg PO Q4H PRN


   PRN Reason: Pain (Mild 1-3)/fever


   Last Admin: 11/04/21 06:14 Dose:  650 mg


   Documented by: 


Enoxaparin Sodium (Enoxaparin 40 Mg/0.4 Ml Syringe)  40 mg SUBCUT DAILY [facility]


Ceftriaxone Sodium 1 gm/ (Sodium Chloride)  100 mls @ 200 mls/hr IV Q24H [facility]


   Last Admin: 11/03/21 16:14 Dose:  200 mls/hr


   Documented by: 


Sodium Chloride (Normal Saline)  1,000 mls @ 75 mls/hr IV ASDIRECTED [facility]


   Last Admin: 11/03/21 23:18 Dose:  75 mls/hr


   Documented by: 


Ondansetron HCl (Ondansetron 4 Mg/2 Ml Sdv)  4 mg IV Q6H PRN


   PRN Reason: Nausea/Vomiting


Senna/Docusate Sodium (Docusate Sodium/Sennosides 50-8.6 Mg Tab)  1 tab PO BID 

PRN


   PRN Reason: Constipation


Sodium Chloride (Sodium Chloride 0.9% 10 Ml Syringe)  10 ml FLUSH ASDIRECTED PRN


   PRN Reason: Keep Vein Open


   Last Admin: 11/02/21 14:05 Dose:  10 ml


   Documented by: 


Tamsulosin HCl (Tamsulosin 0.4 Mg Cap.Er)  0.4 mg PO BEDTIME [facility]


   Last Admin: 11/03/21 20:18 Dose:  0.4 mg


   Documented by: 


Valacyclovir HCl (Valacyclovir 1,000 Mg Tab)  1,000 mg PO BID [facility]


   Last Admin: 11/03/21 20:17 Dose:  1,000 mg


   Documented by: 





Discontinued Medications





Enoxaparin Sodium (Enoxaparin 30 Mg/0.3 Ml Syringe)  30 mg SUBCUT DAILY [facility]


   Last Admin: 11/03/21 09:04 Dose:  Not Given


   Documented by: 


Sodium Chloride (Normal Saline)  1,000 mls @ 1,000 mls/hr IV .BOLUS STA


   Stop: 11/02/21 14:04


   Last Admin: 11/02/21 14:14 Dose:  1,000 mls/hr


   Documented by: 


Ceftriaxone Sodium 1 gm/ (Sodium Chloride)  100 mls @ 200 mls/hr IV ONETIME ONE


   Stop: 11/02/21 15:39


   Last Admin: 11/02/21 15:43 Dose:  200 mls/hr


   Documented by: 


Valacyclovir HCl (Valacyclovir 1,000 Mg Tab)  1,000 mg PO ONETIME ONE


   Stop: 11/02/21 15:11


   Last Admin: 11/02/21 15:50 Dose:  1,000 mg


   Documented by: 











- Exam


Quality Assessment: DVT Prophylaxis.  No: Supplemental Oxygen, Urine Catheter


General: Alert, Oriented, Cooperative, No Acute Distress


HEENT: Mucous Membr. Moist/Pink, Other (Ptosis of left eye).  No: Pupils Equal 

(Left pupil dilated chronically), Pupils Reactive


Neck: Supple, Trachea Midline


Lungs: Clear to Auscultation, Normal Respiratory Effort


Cardiovascular: Regular Rate, Regular Rhythm


GI/Abdominal Exam: Normal Bowel Sounds, Soft, Non-Tender, No Distention


 (Male) Exam: Deferred


Back Exam: Normal Inspection, Full Range of Motion, Other (Herpetic rash to 

right upper back)


Extremities: Normal Inspection, Normal Range of Motion, Non-Tender, No Pedal 

Edema


Skin: Warm, Dry, Intact, Rash (Herpetic rash to right upper back- lesions have 

crusted over.)


Neurological: No New Focal Deficit


Psy/Mental Status: Alert, Normal Affect, Normal Mood





- Patient Data


Lab Results Last 24 hrs: 


                         Laboratory Results - last 24 hr











  11/03/21 11/04/21 11/04/21 Range/Units





  05:04 05:32 05:32 


 


WBC   5.51   (4.23-9.07)  K/mm3


 


RBC   3.68 L   (4.63-6.08)  M/mm3


 


Hgb   11.4 L   (13.7-17.5)  gm/dl


 


Hct   36.0 L   (40.1-51.0)  %


 


MCV   97.8 H   (79.0-92.2)  fl


 


MCH   31.0   (25.7-32.2)  pg


 


MCHC   31.7 L   (32.2-35.5)  g/dl


 


RDW Std Deviation   49.9 H   (35.1-43.9)  fL


 


Plt Count   180   (163-337)  K/mm3


 


MPV   9.4   (9.4-12.3)  fl


 


Neut % (Auto)   51.4   (34.0-67.9)  %


 


Lymph % (Auto)   32.1   (21.8-53.1)  %


 


Mono % (Auto)   11.4   (5.3-12.2)  %


 


Eos % (Auto)   3.8   (0.8-7.0)  


 


Baso % (Auto)   1.1   (0.1-1.2)  %


 


Neut # (Auto)   2.83   (1.78-5.38)  K/mm3


 


Lymph # (Auto)   1.77   (1.32-3.57)  K/mm3


 


Mono # (Auto)   0.63   (0.30-0.82)  K/mm3


 


Eos # (Auto)   0.21   (0.04-0.54)  K/mm3


 


Baso # (Auto)   0.06   (0.01-0.08)  K/mm3


 


Manual Slide Review  Abnormal smear    


 


Sodium    141  (136-145)  mEq/L


 


Potassium    4.4  (3.5-5.1)  mEq/L


 


Chloride    111 H  ()  mEq/L


 


Carbon Dioxide    23  (21-32)  mEq/L


 


Anion Gap    11.4  (5-15)  


 


BUN    25 H  (7-18)  mg/dL


 


Creatinine    1.7 H  (0.7-1.3)  mg/dL


 


Est Cr Clr Drug Dosing    28.94  mL/min


 


Estimated GFR (MDRD)    38  (>60)  mL/min


 


BUN/Creatinine Ratio    14.7  (14-18)  


 


Glucose    100 H  (70-99)  mg/dL


 


Calcium    8.2 L  (8.5-10.1)  mg/dL


 


Magnesium    2.0  (1.8-2.4)  mg/dL


 


C-Reactive Protein    2.4 H*  (<1.0)  mg/dL











Result Diagrams: 


                                 11/04/21 05:32





                                 11/04/21 05:32





Sepsis Event Note





- Evaluation


Sepsis Screening Result: No Definite Risk





- Focused Exam


Vital Signs: 


                                   Vital Signs











  Temp Pulse Resp BP Pulse Ox


 


 11/04/21 03:41  97.3 F  65  16  108/56 L  96


 


 11/04/21 01:01  97.3 F  64  16  114/64  97


 


 11/03/21 20:16  97.5 F  65  16  111/67  95














- Problem List & Annotations


(1) Generalized weakness


SNOMED Code(s): 56440369


   Code(s): R53.1 - WEAKNESS   Status: Acute   Priority: High   Current Visit: 

Yes   





(2) Shingles


SNOMED Code(s): 4983982


   Code(s): B02.9 - ZOSTER WITHOUT COMPLICATIONS   Status: Acute   Priority: 

Medium   Current Visit: Yes   


Qualifiers: 


   Herpes zoster complications: unspecified herpes zoster complication   

Qualified Code(s): B02.8 - Zoster with other complications   





(3) UTI, Urinary tract infectious disease


SNOMED Code(s): 36892129


   Code(s): N39.0 - URINARY TRACT INFECTION, SITE NOT SPECIFIED   Status: Acute 

 Priority: High   Current Visit: Yes   





(4) Chronic renal insufficiency, stage III (moderate)


SNOMED Code(s): 417762717


   Code(s): N18.30 - CHRONIC KIDNEY DISEASE, STAGE 3 UNSPECIFIED   Status: 

Chronic   Priority: Medium   Current Visit: Yes   


Qualifiers: 


   Chronic kidney disease stage 3 subtype: stage 3a (GFR 45-59)   Qualified 

Code(s): N18.31 - Chronic kidney disease, stage 3a   





(5) Elevated lactic acid level


SNOMED Code(s): 4687708


   Code(s): R79.89 - OTHER SPECIFIED ABNORMAL FINDINGS OF BLOOD CHEMISTRY   

Status: Resolved   Priority: High   Current Visit: Yes   





(6) AAA (abdominal aortic aneurysm)


SNOMED Code(s): 641787454


   Code(s): I71.4 - ABDOMINAL AORTIC ANEURYSM, WITHOUT RUPTURE   Status: Chronic

  Priority: Medium   Current Visit: No   


Qualifiers: 


   Presence of rupture: without rupture   Qualified Code(s): I71.4 - Abdominal 

aortic aneurysm, without rupture   





(7) COPD (chronic obstructive pulmonary disease)


SNOMED Code(s): 80504988


   Code(s): J44.9 - CHRONIC OBSTRUCTIVE PULMONARY DISEASE, UNSPECIFIED   Status:

Chronic   Priority: Medium   Current Visit: No   


Qualifiers: 


   COPD type: unspecified COPD   Qualified Code(s): J44.9 - Chronic obstructive 

pulmonary disease, unspecified   





(8) Cerebral arterial aneurysm


SNOMED Code(s): 159167736


   Code(s): I67.1 - CEREBRAL ANEURYSM, NONRUPTURED   Status: Chronic   Priority:

Low   Current Visit: No   





(9) HTN (hypertension)


SNOMED Code(s): 01668777


   Code(s): I10 - ESSENTIAL (PRIMARY) HYPERTENSION   Status: Chronic   Priority:

Medium   Current Visit: No   


Qualifiers: 


   Hypertension type: unspecified   Qualified Code(s): I10 - Essential (primary)

hypertension   





(10) Prostate disorder


SNOMED Code(s): 16198818


   Code(s): N42.9 - DISORDER OF PROSTATE, UNSPECIFIED   Status: Chronic   

Priority: Medium   Current Visit: No   





(11) Recurrent UTI


SNOMED Code(s): 461733927


   Code(s): N39.0 - URINARY TRACT INFECTION, SITE NOT SPECIFIED   Status: 

Chronic   Priority: High   Current Visit: Yes   





(12) Diarrhea


SNOMED Code(s): 33609557


   Code(s): R19.7 - DIARRHEA, UNSPECIFIED   Status: Resolved   Priority: High   

Current Visit: Yes   


Qualifiers: 


   Diarrhea type: unspecified type   Qualified Code(s): R19.7 - Diarrhea, 

unspecified   





(13) Acute renal failure


SNOMED Code(s): 04114938


   Code(s): N17.9 - ACUTE KIDNEY FAILURE, UNSPECIFIED   Status: Acute   

Priority: High   Current Visit: Yes   


Qualifiers: 


   Acute renal failure type: unspecified   Qualified Code(s): N17.9 - Acute 

kidney failure, unspecified   





- Problem List Review


Problem List Initiated/Reviewed/Updated: Yes





- My Orders


Last 24 Hours: 


My Active Orders





11/03/21 09:44


Consult to Case Management/ [CONS] Routine 


OT Evaluation and Treatment [CONS] Routine 





11/03/21 10:56


FECAL LACTOFERRIN [MREF] Routine 


OVA & PARASITES BY IMMUNOASSAY [MREF] Routine 


STOOL CULTURE/SHIGA TOXIN [MREF] Routine 





11/03/21 11:45


valACYclovir [Valtrex]   1,000 mg PO BID 





11/03/21 13:01


Consult to Dietician [CONS] Routine 





11/05/21 05:11


BASIC METABOLIC PANEL,BMP [CHEM] AM 


CBC WITH AUTO DIFF [HEME] AM 


CRP [C-REACTIVE PROTEIN] [CHEM] AM 


MAGNESIUM [CHEM] AM 





11/06/21 05:11


BASIC METABOLIC PANEL,BMP [CHEM] AM 


CBC WITH AUTO DIFF [HEME] AM 


CRP [C-REACTIVE PROTEIN] [CHEM] AM 


MAGNESIUM [CHEM] AM 





11/07/21 05:11


BASIC METABOLIC PANEL,BMP [CHEM] AM 


CBC WITH AUTO DIFF [HEME] AM 


CRP [C-REACTIVE PROTEIN] [CHEM] AM 


MAGNESIUM [CHEM] AM 














- Plan


Plan:: 


85-year-old male from [facility] presents with weakness and 

diarrhea for approximately 1 week.





Diarrhea, Resolved 


No need for further work-up.  We will discontinue stool studies.  Last bowel 

movement was yesterday morning





Acute on chronic, stage III, renal disease


Creatinine on admission was 2.2 and has improved to 1.7.  GFR on admission was 

29 and has improved to 38.  Patient receiving IV fluids.  Intake on admission 

was very poor however he has improved this throughout his stay.  Resolution of 

his diarrhea likely also helped renal function.  Labs from 1/18/2021 showed 

creatinine of 1.4 and a GFR 48.





Lactic acidosis, resolved 


Initial lactic acid of 2.3 in ED.  This is likely secondary to hypovolemia and 

poor tissue perfusion.  He was given 1000 mL of normal saline in the emergency 

department. Resolved on floor.





UTI


UA showed  WBCs with only 0-5 epithelial cells.  Urine and blood cultures 

were obtained in the emergency department and given ceftriaxone 1 g. History of 

E. Coli UTIs. Nursing reports gross pyuria.  Prior labs reviewed and showed 

urine growing E. coli with some resistance antibodies





Shingles right upper back


Unknown how long this has been present.  Rash appears to be at least a few days 

old but no one knows for sure. [facility] he was 

complaining of back pain at that visit, but also he stated that this has been 

going on since 1956 and has not changed. Valtrex 1gm started in ED. lesions have

 crusted over.





Active problems: Intracranial aneurysm, he BPH, hematuria, diverticular disease 

of colon, abdominal aortic aneurysm of 3.6 cm, hypertension, elevated PSA, low 

back pain.





Plan


* Inpatient 


* Discontinue IV hydration after completion of current bag 


* Discontinue Stool studies as diarrhea has resolved 


* Rocephin 1 g daily awaiting urine culture and blood cultures.


* Symptomatic care for shingles.  Place in isolation - droplet and contact.


* Continue home medication of amlodipine for hypertension


* Continue tamsulosin.


* IV fluids as ordered 


* Daily labs


* PT/OT


* CM/SW consult as [facility] if reportedly refusing patient return 


* Continue Valtrex 1gm Q12hr due to poor renal function


* Await urine and blood cultures. 





VTE prophylaxis with Lovenox


CODE STATUS: Full code


PCP: [facility]


Disposition: Likely discharge tomorrow pending urine cultures and blood 

cultures.

## 2021-11-05 NOTE — [id number]
**Discharge Summary





- Hospital Course


HPI Initial Comments: 


85[facility]-old [facility] has had diarrhea for approximately 1 week.  Patient is a poor 

historian, therefore history was obtained through patient himself, emergency 

department notes, and [facility] notes.  [facility] the [facility] 

[facility] who told her that they were unable to care for him anymore at this 

time.  There was suggestion that he would need nursing home type options.  Iris

use of his diarrhea the provider felt he needed to go to the emergency 

department for "possible hospitalization until either he is strong enough to go 

back to assisted living or more appropriate housing is found."  Also there were 

several notes per [facility] stating that he has increased 

problem with transferring due to pain.  He apparently has not taken any meds for

approximately 1 week.





Also, patient was found to have a rash on the right side of his back.  Is been 

there for at least a couple of days but could be longer.  He states he did not 

know about the rash until today, but it is painful.  He was also found to have a

UTI in the emergency department and given 1 g Rocephin.





Patient also has a history of abdominal aortic aneurysm and cerebral aneurysms. 

It has left him with ptosis of the left eye and asymmetry of the pupils with the

left eye being dilated.





Diagnosis: Stroke: No





- Discharge Data


Discharge Date: 11/05/21 (Admit date: 11/2/2021)


Discharge Disposition: Home, [facility] Agency [facility]


Condition: Good





- Referral to Home Health


Date of Face to Face Encounter: 11/05/21


Reason for Homebound Status: See discharge summary


Primary Care Physician: 


Melanie Cao MD





Skilled Need: See discharge summary





- Discharge Diagnosis/Problem(s)


(1) Generalized weakness


SNOMED Code(s): 53529451


   ICD Code: R53.1 - WEAKNESS   Status: Acute   Priority: High   Current Visit: 

Yes   





(2) Shingles


SNOMED Code(s): 5533438


   ICD Code: B02.9 - ZOSTER WITHOUT COMPLICATIONS   Status: Acute   Priority: 

Medium   Current Visit: Yes   


Qualifiers: 


   Herpes zoster complications: unspecified herpes zoster complication   

Qualified Code(s): B02.8 - Zoster with other complications   





(3) UTI, Urinary tract infectious disease


SNOMED Code(s): 89859040


   ICD Code: N39.0 - URINARY TRACT INFECTION, SITE NOT SPECIFIED   Status: Acute

  Priority: High   Current Visit: Yes   





(4) Chronic renal insufficiency, stage III (moderate)


SNOMED Code(s): 606032415


   ICD Code: N18.30 - CHRONIC KIDNEY DISEASE, STAGE 3 UNSPECIFIED   Status: 

Chronic   Priority: Medium   Current Visit: Yes   


Qualifiers: 


   Chronic kidney disease stage 3 subtype: stage 3a (GFR 45-59)   Qualified 

Code(s): N18.31 - Chronic kidney disease, stage 3a   





(5) Elevated lactic acid level


SNOMED Code(s): 4139339


   ICD Code: R79.89 - OTHER SPECIFIED ABNORMAL FINDINGS OF BLOOD CHEMISTRY   

Status: Resolved   Priority: High   Current Visit: Yes   





(6) AAA (abdominal aortic aneurysm)


SNOMED Code(s): 483376370


   ICD Code: I71.4 - ABDOMINAL AORTIC ANEURYSM, WITHOUT RUPTURE   Status: 

Chronic   Priority: Medium   Current Visit: No   


Qualifiers: 


   Presence of rupture: without rupture   Qualified Code(s): I71.4 - Abdominal 

aortic aneurysm, without rupture   





(7) COPD (chronic obstructive pulmonary disease)


SNOMED Code(s): 30008781


   ICD Code: J44.9 - CHRONIC OBSTRUCTIVE PULMONARY DISEASE, UNSPECIFIED   

Status: Chronic   Priority: Medium   Current Visit: No   


Qualifiers: 


   COPD type: unspecified COPD   Qualified Code(s): J44.9 - Chronic obstructive 

pulmonary disease, unspecified   





(8) Cerebral arterial aneurysm


SNOMED Code(s): 447384416


   ICD Code: I67.1 - CEREBRAL ANEURYSM, NONRUPTURED   Status: Chronic   

Priority: Low   Current Visit: No   





(9) HTN (hypertension)


SNOMED Code(s): 01935360


   ICD Code: I10 - ESSENTIAL (PRIMARY) HYPERTENSION   Status: Chronic   

Priority: Medium   Current Visit: No   


Qualifiers: 


   Hypertension type: unspecified   Qualified Code(s): I10 - Essential (primary)

hypertension   





(10) Prostate disorder


SNOMED Code(s): 79523147


   ICD Code: N42.9 - DISORDER OF PROSTATE, UNSPECIFIED   Status: Chronic   

Priority: Medium   Current Visit: No   





(11) Recurrent UTI


SNOMED Code(s): 997586779


   ICD Code: N39.0 - URINARY TRACT INFECTION, SITE NOT SPECIFIED   Status: 

Chronic   Priority: High   Current Visit: Yes   





(12) Diarrhea


SNOMED Code(s): 00638162


   ICD Code: R19.7 - DIARRHEA, UNSPECIFIED   Status: Resolved   Priority: High  

Current Visit: Yes   


Qualifiers: 


   Diarrhea type: unspecified type   Qualified Code(s): R19.7 - Diarrhea, 

unspecified   





(13) Acute renal failure


SNOMED Code(s): 45085971


   ICD Code: N17.9 - ACUTE KIDNEY FAILURE, UNSPECIFIED   Status: Acute   

Priority: High   Current Visit: Yes   


Qualifiers: 


   Acute renal failure type: unspecified   Qualified Code(s): N17.9 - Acute 

kidney failure, unspecified   





- Patient Summary/Data


Consults: 


                                  Consultations





11/02/21 17:35


PT Evaluation and Treatment [CONS] Routine 





11/03/21 09:44


Consult to Case Management/ [CONS] Routine 


OT Evaluation and Treatment [CONS] Routine 





11/03/21 13:01


Consult to Dietician [CONS] Routine 











Labs Pending at D/C: 


None





Recommended Follow-up Testing/Procedures: 


Follow-up with primary care provider within 7 to 10 days of discharge, sooner if

 needed.


* Patient discharged on Augmentin based on cultures and sensitivities of his UA.


* Home chronic antibiotics discontinued.  All other home medications were 

  continued.


* Recommend repeat CBC, CMP, and magnesium in follow-up.


* Prescribed Valtrex for his right upper back herpes zoster.





Consider outpatient urology follow-up.





Hospital Course: 


85-year-old male admitted to the floor for management and further work-up of 

weakness and diarrhea which had reportedly been ongoing for 1 week.  Per report 

patient had been defecating in his bed and too weak to get to the bathroom.  He 

was found to have a UA suggestive of UTI and started on 1 g Rocephin for this.  

He had been noting right upper back pain and was found to have a rash consistent

 with herpes zoster.  He was started on valacyclovir 1 g twice daily, as he does

 have baseline renal insufficiency.  Multiple stool studies were ordered however

 the patient's diarrhea did resolve prior to this being obtained.  Patient was 

provided IV fluids and his creatinine improved from 2.2 to 1.5 with a subsequent

 GFR improvement from 29 to 44.  He was noted to be septic in the ED with a 

lactic acid of 2.3 and this did resolve with treatment prior to admission to the

 floor.  In regards to his UA patient was noted to have gross pyuria by nursing.

  Urine culture grew out Enterococcus species greater than 100,000 CFU's and 10-

50 CFU's of E. coli.  Patient does have a history of frequent UTIs and has had 

multiple urine cultures returning E. coli with some antibiotic resistance.  

After reviewing sensitivities patient was placed on 875/125 Augmentin 1 tab 

twice daily for 5 more days.  He was denying any urinary symptoms prior to 

discharge.  As noted he did have a herpetic rash on his right back consistent 

with herpes zoster.  He was given Valtrex 1 g in the ED and his lesions had 

crusted over.  He was denying any constant pain but did note some pain with 

movement or palpation to this area.  He is high risk for neuropathy given his 

advanced age we will therefore continue Valtrex 1 g twice daily for 4 more days.

  He does have several chronic antibiotics listed nasal be discontinued while 

the patient is on Augmentin.  Otherwise all home meds were continued.  Recommend

 follow-up with PCP within 7 to 10 days of discharge, sooner if needed.  

Recommend repeat CBC, CMP, and magnesium in follow-up.  Recommend considering 

outpatient follow-up with urology, given patient's recurrent UTIs.  While here 

patient was seen by PT and OT who are recommending return to [facility] with 

home health PT and OT.  Orders were placed for this.  Discharged today back to 

[facility]. 





I personally met with Leighton prior to discharge to discuss his homebound 

status.  At this time our team is recommending home health PT and OT along with 

skilled nursing services.  Leighton has difficulty with his vision due to a 

chronic cerebral aneurysm with ptosis of his left eye.  He also has chronic 

weakness and is unable to drive.  We are recommending home health PT for 

strengthening.  We are recommending home health OT for strengthening and ADL 

training.  We are recommending home health skilled nursing services for overall 

disease management and assistance with medications.  The services can be 

monitored by the patient's primary care provider, Dr. Dominguez, and adjusted as 

she sees fit.








- Patient Instructions


Diet: Usual Diet as Tolerated


Activity: As Tolerated


Driving: Do Not Drive


Showering/Bathing: May Shower


Notify Provider of: Fever, Increased Pain, Nausea and/or Vomiting


Other/Special Instructions: Follow-up with primary care provider within 7 to 10 

days of discharge, sooner if needed.  You may benefit from seeing a urologist 

outpatient.  Please discuss this with your primary care provider.  You were 

prescribed an antibiotic for your urinary tract infection.  You should take this

 twice a day with your first dose starting tonight.  Please take this until 

completed.  Resume home medications as directed.  You were prescribed a 

antiviral medication for the shingles which are noted on your right upper back. 

 Please take this twice a day as prescribed until completed.  As they are 

crusted over we do not need to worry about isolation or risk of passing this on.

  Should symptoms return or worsen contact primary care provider or return to 

the emergency room.





- Discharge Plan


*PRESCRIPTION DRUG MONITORING PROGRAM REVIEWED*: No


*COPY OF PRESCRIPTION DRUG MONITORING REPORT IN PATIENT MEG: No


Prescriptions/Med Rec: 


Amoxicillin/Potassium Clav [Augmentin 875-125 Tablet] 1 each PO BID #10 tablet


valACYclovir [Valtrex] 1,000 mg PO BID #8 tablet


Home Medications: 


                                    Home Meds





Cholecalciferol (Vitamin D3) [Vitamin D3] 2,000 unit PO DAILY 01/15/21 [History]


Docusate Sodium [Colace] 100 mg PO BID PRN #20 cap 01/19/21 [Rx]


Acetaminophen [8 Hour Pain Relief] 650 mg PO Q6HR PRN 11/02/21 [History]


Ferrous Sulfate [Iron] 325 mg PO DAILY 11/02/21 [History]


Non-Formulary Medication [NF Drug] 1 applic TOP ASDIRECTED 11/02/21 [History]


Non-Formulary Medication [NF Drug] 1 tab PO DAILY 11/02/21 [History]


Sennosides/Docusate Sodium [Senna Plus 8.6-50 mg Tablet] 1 tab PO BID PRN 

11/02/21 [History]


Sennosides/Docusate Sodium [Senna Plus 8.6-50 mg Tablet] 2 tab PO QPM 11/02/21 

[History]


Tamsulosin [Flomax] 0.4 mg PO BEDTIME 11/02/21 [History]


Amoxicillin/Potassium Clav [Augmentin 875-125 Tablet] 1 each PO BID #10 tablet 

11/05/21 [Rx]


valACYclovir [Valtrex] 1,000 mg PO BID #8 tablet 11/05/21 [Rx]








Oxygen Therapy Mode: Room Air


Patient Handouts:  Urinary Tract Infection, Adult, Steps to Quit Smoking, 

Sepsis, Self Care, Adult


Referrals: 


Melanie Cao MD [Primary Care Provider] - 11/09/21 10:30 am





- Discharge Summary/Plan Comment


DC Time >30 min.: Yes


Total # of Minutes for Discharge Time: 


45 mins 








- General Info


Date of Service: 11/05/21


Admission Dx/Problem (Free Text: 


                           Admission Diagnosis/Problem





Admission Diagnosis/Problem      Urinary tract infection








Functional Status: Reports: Pain Controlled, Tolerating Diet, Ambulating, 

Urinating.  Denies: New Symptoms





- Review of Systems


General: Reports: Weakness (improving ).  Denies: Fever, Fatigue, Malaise, 

Chills


HEENT: Reports: No Symptoms.  Denies: Headaches, Sore Throat, Visual Changes


Pulmonary: Reports: No Symptoms.  Denies: Shortness of Breath, Cough, Sputum, 

Wheezing


Cardiovascular: Reports: No Symptoms.  Denies: Chest Pain, Palpitations, Dyspnea

on Exertion, Edema


Gastrointestinal: Reports: No Symptoms.  Denies: Abdominal Pain, Constipation, 

Diarrhea, Nausea, Vomiting


Genitourinary: Reports: No Symptoms.  Denies: Pain


Musculoskeletal: Reports: Back Pain, Joint Pain


Skin: Reports: No Symptoms.  Denies: Cyanosis


Neurological: Reports: No Symptoms.  Denies: Confusion, Pre-Existing Deficit, 

Difficulty Walking, Gait Disturbance


Psychiatric: Reports: No Symptoms





- Patient Data


Vitals - Most Recent: 


                                Last Vital Signs











Temp  98.1 F   11/05/21 08:27


 


Pulse  61   11/05/21 08:27


 


Resp  20   11/05/21 08:27


 


BP  112/64   11/05/21 08:27


 


Pulse Ox  96   11/05/21 08:27











Weight - Most Recent: 148 lb 8 oz


I&O - Last 24 hours: 


                                 Intake & Output











 11/04/21 11/05/21 11/05/21





 22:59 06:59 14:59


 


Intake Total 2052 541 


 


Output Total 425 550 


 


Balance 1627 -9 











Lab Results - Last 24 hrs: 


                         Laboratory Results - last 24 hr











  11/05/21 11/05/21 Range/Units





  05:44 05:44 


 


WBC  6.08   (4.23-9.07)  K/mm3


 


RBC  3.56 L   (4.63-6.08)  M/mm3


 


Hgb  11.0 L   (13.7-17.5)  gm/dl


 


Hct  34.8 L   (40.1-51.0)  %


 


MCV  97.8 H   (79.0-92.2)  fl


 


MCH  30.9   (25.7-32.2)  pg


 


MCHC  31.6 L   (32.2-35.5)  g/dl


 


RDW Std Deviation  49.4 H   (35.1-43.9)  fL


 


Plt Count  181   (163-337)  K/mm3


 


MPV  9.5   (9.4-12.3)  fl


 


Neut % (Auto)  54.4   (34.0-67.9)  %


 


Lymph % (Auto)  28.3   (21.8-53.1)  %


 


Mono % (Auto)  10.5   (5.3-12.2)  %


 


Eos % (Auto)  5.9   (0.8-7.0)  


 


Baso % (Auto)  0.7   (0.1-1.2)  %


 


Neut # (Auto)  3.31   (1.78-5.38)  K/mm3


 


Lymph # (Auto)  1.72   (1.32-3.57)  K/mm3


 


Mono # (Auto)  0.64   (0.30-0.82)  K/mm3


 


Eos # (Auto)  0.36   (0.04-0.54)  K/mm3


 


Baso # (Auto)  0.04   (0.01-0.08)  K/mm3


 


Sodium   140  (136-145)  mEq/L


 


Potassium   4.0  (3.5-5.1)  mEq/L


 


Chloride   110 H  ()  mEq/L


 


Carbon Dioxide   19 L  (21-32)  mEq/L


 


Anion Gap   15.0  (5-15)  


 


BUN   20 H  (7-18)  mg/dL


 


Creatinine   1.5 H  (0.7-1.3)  mg/dL


 


Est Cr Clr Drug Dosing   34.30  mL/min


 


Estimated GFR (MDRD)   44  (>60)  mL/min


 


BUN/Creatinine Ratio   13.3 L  (14-18)  


 


Glucose   103 H  (70-99)  mg/dL


 


Calcium   8.2 L  (8.5-10.1)  mg/dL


 


Magnesium   2.0  (1.8-2.4)  mg/dL


 


C-Reactive Protein   1.2 H*  (<1.0)  mg/dL











BILLIE Results - Last 24 hrs: 


                                  Microbiology











 11/02/21 13:25 Urine Culture - Final





 Urine    Enterococcus Species





    Escherichia Coli


 


 11/02/21 14:05 Blood Culture - Preliminary





 Blood - Venous - Lab Draw 


 


 11/02/21 13:55 Blood Culture - Preliminary





 Blood - Venous 











Med Orders - Current: 


                               Current Medications





Acetaminophen (Acetaminophen 325 Mg Tab)  650 mg PO Q4H PRN


   PRN Reason: Pain (Mild 1-3)/fever


   Last Admin: 11/05/21 08:30 Dose:  650 mg


   Documented by: 


Enoxaparin Sodium (Enoxaparin 40 Mg/0.4 Ml Syringe)  40 mg SUBCUT DAILY [facility]


   Last Admin: 11/05/21 08:31 Dose:  Not Given


   Documented by: 


Ondansetron HCl (Ondansetron 4 Mg/2 Ml Sdv)  4 mg IV Q6H PRN


   PRN Reason: Nausea/Vomiting


Senna/Docusate Sodium (Docusate Sodium/Sennosides 50-8.6 Mg Tab)  1 tab PO BID 

PRN


   PRN Reason: Constipation


Sodium Chloride (Sodium Chloride 0.9% 10 Ml Syringe)  10 ml FLUSH ASDIRECTED PRN


   PRN Reason: Keep Vein Open


   Last Admin: 11/02/21 14:05 Dose:  10 ml


   Documented by: 


Tamsulosin HCl (Tamsulosin 0.4 Mg Cap.Er)  0.4 mg PO BEDTIME [facility]


   Last Admin: 11/04/21 20:11 Dose:  0.4 mg


   Documented by: 


Valacyclovir HCl (Valacyclovir 1,000 Mg Tab)  1,000 mg PO BID [facility]


   Last Admin: 11/05/21 08:30 Dose:  1,000 mg


   Documented by: 





Discontinued Medications





Amoxicillin/Clavulanate Potassium (Amoxicillin/Clavulanate K 875-125 Mg Tab)  1 

tab PO ONETIME ONE


   Stop: 11/05/21 10:02


Enoxaparin Sodium (Enoxaparin 30 Mg/0.3 Ml Syringe)  30 mg SUBCUT DAILY [facility]


   Last Admin: 11/03/21 09:04 Dose:  Not Given


   Documented by: 


Sodium Chloride (Normal Saline)  1,000 mls @ 1,000 mls/hr IV .BOLUS STA


   Stop: 11/02/21 14:04


   Last Admin: 11/02/21 14:14 Dose:  1,000 mls/hr


   Documented by: 


Ceftriaxone Sodium 1 gm/ (Sodium Chloride)  100 mls @ 200 mls/hr IV ONETIME ONE


   Stop: 11/02/21 15:39


   Last Admin: 11/02/21 15:43 Dose:  200 mls/hr


   Documented by: 


Ceftriaxone Sodium 1 gm/ (Sodium Chloride)  100 mls @ 200 mls/hr IV Q24H [facility]


   Last Admin: 11/04/21 15:05 Dose:  200 mls/hr


   Documented by: 


Sodium Chloride (Normal Saline)  1,000 mls @ 75 mls/hr IV ASDIRECTED [facility]


   Last Admin: 11/03/21 23:18 Dose:  75 mls/hr


   Documented by: 


Valacyclovir HCl (Valacyclovir 1,000 Mg Tab)  1,000 mg PO ONETIME ONE


   Stop: 11/02/21 15:11


   Last Admin: 11/02/21 15:50 Dose:  1,000 mg


   Documented by: 











- Exam


Quality Assessment: Reports: DVT Prophylaxis.  Denies: Supplemental Oxygen, 

Urine Catheter


General: Reports: Alert, Oriented, Cooperative, No Acute Distress


HEENT: Reports: Mucous Membr. Moist/Pink, Other (ptosis of the left eye).  

Denies: Pupils Equal (Dilated left pupilchronic), Pupils Reactive (Cerebral 

aneurysmchronic resulting in asymmetrical pupils)


Neck: Reports: Supple, Trachea Midline


Lungs: Reports: Clear to Auscultation, Normal Respiratory Effort


Cardiovascular: Reports: Regular Rate, Regular Rhythm


GI/Abdominal Exam: Normal Bowel Sounds, Soft, Non-Tender, No Distention


 (Male) Exam: Deferred


Rectal (Males) Exam: Deferred


Back Exam: Reports: Normal Inspection, Full Range of Motion, Other (Herpetic 

rash on right upper back.  Lesions have scabbed over.)


Extremities: Normal Inspection, Normal Range of Motion, Non-Tender, No Pedal 

Edema, Normal Capillary Refill


Skin: Reports: Warm, Dry, Intact


Neurological: Reports: No New Focal Deficit


Psy/Mental Status: Reports: Alert, Normal Affect, Normal Mood

## 2021-12-16 NOTE — PCM.HP.2
H&P History of Present Illness





- General


Date of Service: 12/16/21


Admit Problem/Dx: 


                           Admission Diagnosis/Problem





Admission Diagnosis/Problem      Urosepsis








Source of Information: Patient, Old Records, Provider, RN, RN Notes Reviewed


History Limitations: Reports: No Limitations





- History of Present Illness


Initial Comments - Free Text/Narative: 


This is an 86-year-old male who presents to ED from [facility] assisted living 

facility with chills, generalized weakness, tremor, confusion, frequent 

urination, and tea colored urine for the past 3 to 4 days.  He does have a 

history of untreated brain aneurysm with left vision difficulties and left pupil

dilation.  He is elected not to pursue this.  He reportedly slid out of his 

wheelchair in our waiting room and they noted some swelling on his occipital 

scalp but no bruising or tenderness.





In the ED twelve-lead EKG is obtained showing a sinus rhythm at 86 bpm.  PACs 

are present in a prolonged CA interval.  Q waves are noted in V2 through V3.  

Temp is 98 Fahrenheit.  Pulse 110.  Respirations 16.  Blood pressure 109/65.  

Pulse ox 99%.  Labs are obtained showing a WBC of 16.47.  Hemoglobin 13.5.  

Platelet 281,000.  Neutrophils are elevated 84%.  There is 10% band neutrophils 

noted.  INR is 1.06.  Sodium 139.  Potassium 4.1.  Chloride 103.  Carbon dioxide

21.  Anion gap is 19.1.  BUN is 22.  Creatinine 2.0.  GFR 32.  Glucose 127.  

Calcium is 9.1.  Bilirubin 0.6.  AST is 13, ALT 20, alkaline phosphatase 66.  

CRP is 2.6.  Protein is 7.5.  Albumin is 3.1.  Lactic acid is initially 4.1 and 

is 3.5 on repeat.  SARS-CoV-2 RNA is negative.  Chest x-ray is obtained showing 

emphysematous change but nothing acute.  Left wrist x-rays obtained showing 

osteopenia area and degenerative change with chondrocalcinosis but no acute a

bnormality.  Patient is noted to have difficulty voiding in the ED and initially

refuses straight catheterization.  He ultimately does agree. UA shows turbid 

urine with 2+ protein, 2+ occult blood, Vikram, 3+ leukoesterase, 50-75 RBCs, too

numerous to count WBCs, many bacteria and moderate mucus.  Blood cultures were 

obtained and are pending.  





Of note patient was recently hospitalized for UTI from 11-2-2021 through 

11-5-2021 and culture grew out E. coli and Enterococcus species.  During that ti

me patient was receiving Rocephin.  He was discharged on 5 more days of [id number] 

Augmentin.  During that visit he was also noted to have herpes zoster on his 

right upper back and was discharged on Valtrex for this.





He carries a history of brain aneurysm, hypertension, aortic arch aneurysm, 

COPD, chronic constipation, BPH, recurrent UTI, chronic back pain, dementia.  He

is a current occasional tobacco user.  He is a full code.  [facility].  He subsequently admitted to the medical floor on telemetry for 

management of his UTI and sepsis.





  ** Lower Back


Pain Score (Numeric/FACES): 5





- Related Data


Allergies/Adverse Reactions: 


                                    Allergies











Allergy/AdvReac Type Severity Reaction Status Date / Time


 


No Known Allergies Allergy   Verified 12/16/21 08:35











Home Medications: 


                                    Home Meds





Cholecalciferol (Vitamin D3) [Vitamin D3] 2,000 unit PO DAILY 01/15/21 [History]


Docusate Sodium [Colace] 100 mg PO BID PRN #20 cap 01/19/21 [Rx]


Ferrous Sulfate [Iron] 325 mg PO DAILY 11/02/21 [History]


Sennosides/Docusate Sodium [Senna Plus 8.6-50 mg Tablet] 1 tab PO BID PRN 

11/02/21 [History]


Sennosides/Docusate Sodium [Senna Plus 8.6-50 mg Tablet] 2 tab PO QPM 11/02/21 

[History]


Tamsulosin [Flomax] 0.4 mg PO BEDTIME 11/02/21 [History]


Acetaminophen 650 mg PO QID PRN 12/16/21 [History]


L.acidoph,Paracasei, B.lactis [Probiotic] 1 cap PO DAILY 12/16/21 [History]











Past Medical History


HEENT History: Reports: Impaired Vision, Other (See Below)


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


Cardiovascular History: Reports: Hypertension, Other (See Below)


Other Cardiovascular History: abdominal aortic aneurysm


Respiratory History: Reports: COPD


Gastrointestinal History: Reports: Chronic Constipation, Other (See Below)


Other Gastrointestinal History: For past 1.5 years


Genitourinary History: Reports: BPH, Prostate Disorder, UTI, Recurrent


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

 states "made hole in bladder-had mixed feces and urine." pt states then it 

"released itself" confirmed by radiology.  states has 3 kidneys


Musculoskeletal History: Reports: Back Pain, Chronic


Neurological History: Reports: Other (See Below)


Other Neuro History: anursym in the head-to be sent to [facility] Sept 11,2020


Psychiatric History: Reports: Dementia


Endocrine/Metabolic History: Reports: None


Hematologic History: Reports: None


Immunologic History: Reports: None


Oncologic (Cancer) History: Reports: None


Dermatologic History: Reports: Other (See Below)


Other Dermatologic History: currently has shingles





- Infectious Disease History


Infectious Disease History: Reports: Chicken Pox, Measles, Mumps, Shingles


Other Infectious Disease History: currently has shingles





- Past Surgical History


Head Surgeries/Procedures: Reports: None


HEENT Surgical History: Reports: Cataract Surgery, Other (See Below)


Other HEENT Surgeries/Procedures: Bilaterally


Cardiovascular Surgical History: Reports: None


Respiratory Surgical History: Reports: None


GI Surgical History: Reports: None


Male  Surgical History: Reports: None


Musculoskeletal Surgical History: Reports: Other (See Below)


Other Musculoskeletal Surgeries/Procedures:: laminectomy





Social & Family History





- Family History


Family Medical History: No Pertinent Family History


Cardiac: Reports: MI


Musculoskeletal: Reports: Back pain, Chronic





- Tobacco Use


Tobacco Use Status *Q: Current Some Day Tobacco User


Years of Tobacco use: 50


Packs/Tins Daily: 0.1





- Caffeine Use


Caffeine Use: Reports: Coffee, Soda





- Recreational Drug Use


Recreational Drug Use: No





- Living Situation & Occupation


Living situation: Reports: Single


Occupation: Retired





H&P Review of Systems





- Review of Systems:


Review Of Systems: See Below


General: Reports: Chills, Malaise, Weakness, Fatigue.  Denies: Fever


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 

aneurysm).  Denies: Headaches, Sore Throat


Pulmonary: Reports: No Symptoms.  Denies: Shortness of Breath, Wheezing, Cough, 

Sputum


Cardiovascular: Reports: No Symptoms.  Denies: Chest Pain, Palpitations, Dyspnea

 on Exertion, Edema


Gastrointestinal: Reports: No Symptoms.  Denies: Abdominal Pain, Constipation, 

Diarrhea, Nausea, Vomiting


Genitourinary: Reports: Dysuria, Frequency, Urgency, Retention


Musculoskeletal: Reports: Muscle Pain (Generalized)


Skin: Reports: No Symptoms.  Denies: Cyanosis


Psychiatric: Reports: Confusion


Neurological: Reports: Difficulty Walking, Weakness.  Denies: Dizziness, 

Headache, Numbness, Seizure, Syncope, Tingling, Tremors, Gait Disturbance


Hematologic/Lymphatic: Reports: No Symptoms


Immunologic: Reports: No Symptoms





Exam





- Exam


Exam: See Below





- Vital Signs


Vital Signs: 


                                Last Vital Signs











Temp  98 F   12/16/21 08:24


 


Pulse  110 H  12/16/21 08:24


 


Resp  16   12/16/21 08:24


 


BP  109/65   12/16/21 08:24


 


Pulse Ox  99   12/16/21 08:24











Weight: 176 lb





- Exam


Quality Assessment: DVT Prophylaxis.  No: Supplemental Oxygen, Urinary Catheter


General: Alert, Cooperative.  No: Mild Distress


HEENT: Conjunctiva Clear, EACs Clear, Mucosa Moist & Pink, Posterior Pharynx 

Clear, Abnormal Pupils (Chronic left eye ptosis), Other (Mild area of swelling 

to posterior parietal area)


Neck: Supple, Trachea Midline


Lungs: Clear to Auscultation, Normal Respiratory Effort


Cardiovascular: Regular Rhythm, Tachycardia


GI/Abdominal Exam: Normal Bowel Sounds, Soft, Non-Tender, No Distention


 (Male) Exam: Deferred


Rectal (Males) Exam: Deferred


Back Exam: Normal Inspection, Full Range of Motion


Extremities: Normal Inspection, Normal Range of Motion, Non-Tender, No Pedal Ed

ema, Normal Capillary Refill


Skin: Warm, Dry, Intact


Neurological: Cranial Nerves Intact (Grossly but limited)


Neuro Extensive - Mental Status: Alert, Oriented x3, Normal Mood/Affect


Psychiatric: Alert, Normal Affect, Normal Mood





- Patient Data


Lab Results Last 24 hrs: 


                         Laboratory Results - last 24 hr











  12/16/21 12/16/21 12/16/21 Range/Units





  08:55 08:55 08:55 


 


WBC  16.47 H    (4.23-9.07)  K/mm3


 


RBC  4.32 L    (4.63-6.08)  M/mm3


 


Hgb  13.5 L D    (13.7-17.5)  gm/dl


 


Hct  42.8    (40.1-51.0)  %


 


MCV  99.1 H    (79.0-92.2)  fl


 


MCH  31.3    (25.7-32.2)  pg


 


MCHC  31.5 L    (32.2-35.5)  g/dl


 


RDW Std Deviation  56.6 H    (35.1-43.9)  fL


 


Plt Count  281  D    (163-337)  K/mm3


 


MPV  9.1 L    (9.4-12.3)  fl


 


Neutrophils % (Manual)  84 H    (40-60)  %


 


Band Neutrophils %  10    (0-10)  %


 


Lymphocytes % (Manual)  3 L    (20-40)  %


 


Atypical Lymphs %  1    %


 


Monocytes % (Manual)  2    (2-10)  %


 


Eosinophils % (Manual)  0 L    (0.8-7.0)  %


 


Basophils % (Manual)  0 L    (0.2-1.2)  


 


Platelet Estimate  Adequate    


 


Plt Morphology Comment  Normal    


 


RBC Morph Comment  Normal    


 


PT   11.7   (9.7-12.0)  SECONDS


 


INR   1.06   


 


Sodium    139  (136-145)  mEq/L


 


Potassium    4.1  (3.5-5.1)  mEq/L


 


Chloride    103  ()  mEq/L


 


Carbon Dioxide    21  (21-32)  mEq/L


 


Anion Gap    19.1 H  (5-15)  


 


BUN    22 H  (7-18)  mg/dL


 


Creatinine    2.0 H  (0.7-1.3)  mg/dL


 


Est Cr Clr Drug Dosing    TNP  


 


Estimated GFR (MDRD)    32  (>60)  mL/min


 


BUN/Creatinine Ratio    11.0 L  (14-18)  


 


Glucose    127 H  (70-99)  mg/dL


 


Lactic Acid     (0.4-2.0)  mmol/L


 


Calcium    9.1  (8.5-10.1)  mg/dL


 


Total Bilirubin    0.6  (0.2-1.0)  mg/dL


 


AST    13 L  (15-37)  U/L


 


ALT    20  (16-63)  U/L


 


Alkaline Phosphatase    66  ()  U/L


 


C-Reactive Protein    2.6 H*  (<1.0)  mg/dL


 


Total Protein    7.5  (6.4-8.2)  g/dl


 


Albumin    3.1 L  (3.4-5.0)  g/dl


 


Globulin    4.4  gm/dL


 


Albumin/Globulin Ratio    0.7 L  (1-2)  


 


Urine Color     (Yellow)  


 


Urine Appearance     (Clear)  


 


Urine pH     (5.0-8.0)  


 


Ur Specific Gravity     (1.005-1.030)  


 


Urine Protein     (Negative)  


 


Urine Glucose (UA)     (Negative)  


 


Urine Ketones     (Negative)  


 


Urine Occult Blood     (Negative)  


 


Urine Nitrite     (Negative)  


 


Urine Bilirubin     (Negative)  


 


Urine Urobilinogen     (0.2-1.0)  


 


Ur Leukocyte Esterase     (Negative)  


 


Urine RBC     (0-5)  /hpf


 


Urine WBC     (0-5)  /hpf


 


Ur Epithelial Cells     (0-5)  /hpf


 


Urine Bacteria     (FEW)  /hpf


 


Urine Mucus     (FEW)  /hpf


 


SARS-CoV-2 RNA (MADDIE)     (NEGATIVE)  














  12/16/21 12/16/21 12/16/21 Range/Units





  08:55 09:16 09:45 


 


WBC     (4.23-9.07)  K/mm3


 


RBC     (4.63-6.08)  M/mm3


 


Hgb     (13.7-17.5)  gm/dl


 


Hct     (40.1-51.0)  %


 


MCV     (79.0-92.2)  fl


 


MCH     (25.7-32.2)  pg


 


MCHC     (32.2-35.5)  g/dl


 


RDW Std Deviation     (35.1-43.9)  fL


 


Plt Count     (163-337)  K/mm3


 


MPV     (9.4-12.3)  fl


 


Neutrophils % (Manual)     (40-60)  %


 


Band Neutrophils %     (0-10)  %


 


Lymphocytes % (Manual)     (20-40)  %


 


Atypical Lymphs %     %


 


Monocytes % (Manual)     (2-10)  %


 


Eosinophils % (Manual)     (0.8-7.0)  %


 


Basophils % (Manual)     (0.2-1.2)  


 


Platelet Estimate     


 


Plt Morphology Comment     


 


RBC Morph Comment     


 


PT     (9.7-12.0)  SECONDS


 


INR     


 


Sodium     (136-145)  mEq/L


 


Potassium     (3.5-5.1)  mEq/L


 


Chloride     ()  mEq/L


 


Carbon Dioxide     (21-32)  mEq/L


 


Anion Gap     (5-15)  


 


BUN     (7-18)  mg/dL


 


Creatinine     (0.7-1.3)  mg/dL


 


Est Cr Clr Drug Dosing     


 


Estimated GFR (MDRD)     (>60)  mL/min


 


BUN/Creatinine Ratio     (14-18)  


 


Glucose     (70-99)  mg/dL


 


Lactic Acid  4.1 H*    (0.4-2.0)  mmol/L


 


Calcium     (8.5-10.1)  mg/dL


 


Total Bilirubin     (0.2-1.0)  mg/dL


 


AST     (15-37)  U/L


 


ALT     (16-63)  U/L


 


Alkaline Phosphatase     ()  U/L


 


C-Reactive Protein     (<1.0)  mg/dL


 


Total Protein     (6.4-8.2)  g/dl


 


Albumin     (3.4-5.0)  g/dl


 


Globulin     gm/dL


 


Albumin/Globulin Ratio     (1-2)  


 


Urine Color    Yellow  (Yellow)  


 


Urine Appearance    Turbid H  (Clear)  


 


Urine pH    7.0  (5.0-8.0)  


 


Ur Specific Gravity    1.015  (1.005-1.030)  


 


Urine Protein    2+ H  (Negative)  


 


Urine Glucose (UA)    Negative  (Negative)  


 


Urine Ketones    Negative  (Negative)  


 


Urine Occult Blood    2+ H  (Negative)  


 


Urine Nitrite    Negative  (Negative)  


 


Urine Bilirubin    1+ H  (Negative)  


 


Urine Urobilinogen    0.2  (0.2-1.0)  


 


Ur Leukocyte Esterase    3+ H  (Negative)  


 


Urine RBC    50-75 H  (0-5)  /hpf


 


Urine WBC    Too numerous to cnt H  (0-5)  /hpf


 


Ur Epithelial Cells    0-5  (0-5)  /hpf


 


Urine Bacteria    Many H  (FEW)  /hpf


 


Urine Mucus    Moderate H  (FEW)  /hpf


 


SARS-CoV-2 RNA (MADDIE)   Negative   (NEGATIVE)  














  12/16/21 Range/Units





  11:58 


 


WBC   (4.23-9.07)  K/mm3


 


RBC   (4.63-6.08)  M/mm3


 


Hgb   (13.7-17.5)  gm/dl


 


Hct   (40.1-51.0)  %


 


MCV   (79.0-92.2)  fl


 


MCH   (25.7-32.2)  pg


 


MCHC   (32.2-35.5)  g/dl


 


RDW Std Deviation   (35.1-43.9)  fL


 


Plt Count   (163-337)  K/mm3


 


MPV   (9.4-12.3)  fl


 


Neutrophils % (Manual)   (40-60)  %


 


Band Neutrophils %   (0-10)  %


 


Lymphocytes % (Manual)   (20-40)  %


 


Atypical Lymphs %   %


 


Monocytes % (Manual)   (2-10)  %


 


Eosinophils % (Manual)   (0.8-7.0)  %


 


Basophils % (Manual)   (0.2-1.2)  


 


Platelet Estimate   


 


Plt Morphology Comment   


 


RBC Morph Comment   


 


PT   (9.7-12.0)  SECONDS


 


INR   


 


Sodium   (136-145)  mEq/L


 


Potassium   (3.5-5.1)  mEq/L


 


Chloride   ()  mEq/L


 


Carbon Dioxide   (21-32)  mEq/L


 


Anion Gap   (5-15)  


 


BUN   (7-18)  mg/dL


 


Creatinine   (0.7-1.3)  mg/dL


 


Est Cr Clr Drug Dosing   


 


Estimated GFR (MDRD)   (>60)  mL/min


 


BUN/Creatinine Ratio   (14-18)  


 


Glucose   (70-99)  mg/dL


 


Lactic Acid  3.5 H*  (0.4-2.0)  mmol/L


 


Calcium   (8.5-10.1)  mg/dL


 


Total Bilirubin   (0.2-1.0)  mg/dL


 


AST   (15-37)  U/L


 


ALT   (16-63)  U/L


 


Alkaline Phosphatase   ()  U/L


 


C-Reactive Protein   (<1.0)  mg/dL


 


Total Protein   (6.4-8.2)  g/dl


 


Albumin   (3.4-5.0)  g/dl


 


Globulin   gm/dL


 


Albumin/Globulin Ratio   (1-2)  


 


Urine Color   (Yellow)  


 


Urine Appearance   (Clear)  


 


Urine pH   (5.0-8.0)  


 


Ur Specific Gravity   (1.005-1.030)  


 


Urine Protein   (Negative)  


 


Urine Glucose (UA)   (Negative)  


 


Urine Ketones   (Negative)  


 


Urine Occult Blood   (Negative)  


 


Urine Nitrite   (Negative)  


 


Urine Bilirubin   (Negative)  


 


Urine Urobilinogen   (0.2-1.0)  


 


Ur Leukocyte Esterase   (Negative)  


 


Urine RBC   (0-5)  /hpf


 


Urine WBC   (0-5)  /hpf


 


Ur Epithelial Cells   (0-5)  /hpf


 


Urine Bacteria   (FEW)  /hpf


 


Urine Mucus   (FEW)  /hpf


 


SARS-CoV-2 RNA (MADDIE)   (NEGATIVE)  











Result Diagrams: 


                                 12/16/21 08:55





                                 12/16/21 08:55





Sepsis Event Note





- Evaluation


Sepsis Screening Result: Severe Sepsis Risk





- Focused Exam


Vital Signs: 


                                   Vital Signs











  Temp Pulse Resp BP Pulse Ox


 


 12/16/21 08:24  98 F  110 H  16  109/65  99














- Problem List


(1) Dementia


SNOMED Code(s): 94660094


   ICD Code: F03.90 - UNSPECIFIED DEMENTIA WITHOUT BEHAVIORAL DISTURBANCE   

Status: Chronic   Priority: Low   Current Visit: No   


Qualifiers: 


   Dementia type: unspecified type   Dementia behavioral disturbance: without 

behavioral disturbance   Qualified Code(s): F03.90 - Unspecified dementia 

without behavioral disturbance   





(2) Generalized weakness


SNOMED Code(s): 84205336


   ICD Code: R53.1 - WEAKNESS   Status: Acute   Priority: High   Current Visit: 

No   





(3) Ptosis


SNOMED Code(s): 83968196


   ICD Code: H02.409 - UNSPECIFIED PTOSIS OF UNSPECIFIED EYELID   Status: 

Chronic   Priority: Low   Current Visit: No   


Qualifiers: 


   Laterality: left   Qualified Code(s): H02.402 - Unspecified ptosis of left 

eyelid   





(4) Shingles


SNOMED Code(s): 6433243


   ICD Code: B02.9 - ZOSTER WITHOUT COMPLICATIONS   Status: Chronic   Priority: 

Low   Current Visit: No   


Qualifiers: 


   Herpes zoster complications: without complications   Qualified Code(s): B02.9

 - Zoster without complications   





(5) UTI, Urinary tract infectious disease


SNOMED Code(s): 74088724


   ICD Code: N39.0 - URINARY TRACT INFECTION, SITE NOT SPECIFIED   Status: Acute

   Priority: High   Current Visit: No   





(6) AAA (abdominal aortic aneurysm)


SNOMED Code(s): 483919275


   ICD Code: I71.4 - ABDOMINAL AORTIC ANEURYSM, WITHOUT RUPTURE   Status: 

Chronic   Priority: Medium   Current Visit: No   


Qualifiers: 


   Presence of rupture: without rupture   Qualified Code(s): I71.4 - Abdominal 

aortic aneurysm, without rupture   





(7) COPD (chronic obstructive pulmonary disease)


SNOMED Code(s): 95977157


   ICD Code: J44.9 - CHRONIC OBSTRUCTIVE PULMONARY DISEASE, UNSPECIFIED   

Status: Chronic   Priority: Medium   Current Visit: No   


Qualifiers: 


   COPD type: unspecified COPD   Qualified Code(s): J44.9 - Chronic obstructive 

pulmonary disease, unspecified   





(8) Cerebral arterial aneurysm


SNOMED Code(s): 807442261


   ICD Code: I67.1 - CEREBRAL ANEURYSM, NONRUPTURED   Status: Chronic   

Priority: Low   Current Visit: No   





(9) Chronic back pain


SNOMED Code(s): 428100503


   ICD Code: M54.9 - DORSALGIA, UNSPECIFIED; G89.29 - OTHER CHRONIC PAIN   

Status: Chronic   Priority: Low   Current Visit: No   


Qualifiers: 


   Back pain location: back pain in unspecified location   Back pain laterality:

 unspecified   Qualified Code(s): M54.9 - Dorsalgia, unspecified; G89.29 - Other

 chronic pain   





(10) Chronic renal insufficiency, stage III (moderate)


SNOMED Code(s): 269181280


   ICD Code: N18.30 - CHRONIC KIDNEY DISEASE, STAGE 3 UNSPECIFIED   Status: 

Chronic   Priority: Medium   Current Visit: No   


Qualifiers: 


   Chronic kidney disease stage 3 subtype: stage 3a (GFR 45-59)   Qualified 

Code(s): N18.31 - Chronic kidney disease, stage 3a   





(11) Prostate disorder


SNOMED Code(s): 41779601


   ICD Code: N42.9 - DISORDER OF PROSTATE, UNSPECIFIED   Status: Chronic   

Priority: Medium   Current Visit: No   





(12) Recurrent UTI


SNOMED Code(s): 919002233


   ICD Code: N39.0 - URINARY TRACT INFECTION, SITE NOT SPECIFIED   Status: 

Chronic   Priority: High   Current Visit: No   





(13) Elevated lactic acid level


SNOMED Code(s): 1165250


   ICD Code: R79.89 - OTHER SPECIFIED ABNORMAL FINDINGS OF BLOOD CHEMISTRY   

Status: Acute   Priority: High   Current Visit: No   





(14) Sepsis


SNOMED Code(s): 12074061


   ICD Code: A41.9 - SEPSIS, UNSPECIFIED ORGANISM   Status: Acute   Priority: 

High   Current Visit: No   


Qualifiers: 


   Sepsis type: sepsis due to unspecified organism   Sepsis acute organ 

dysfunction status: with acute organ dysfunction   Severe sepsis acute organ 

dysfunction type: unspecified   Severe sepsis shock status: without septic shock

   Qualified Code(s): A41.9 - Sepsis, unspecified organism; R65.20 - Severe 

sepsis without septic shock   


Problem List Initiated/Reviewed/Updated: Yes


Orders Last 24hrs: 


                               Active Orders 24 hr











 Category Date Time Status


 


 Admission Status [Patient Status] [ADT] Routine ADT  12/16/21 12:41 Active


 


 Cardiac Monitoring [RC] .AS DIRECTED Care  12/16/21 13:43 Active


 


 Supplement (Dietary) [Dietary Supplements] [RC] DAILY Care  12/16/21 10:16 

Active


 


 BLOOD CULTURE [MREF] Stat Lab  12/16/21 08:55 Received


 


 BLOOD CULTURE [MREF] Stat Lab  12/16/21 09:04 Received


 


 Sodium Chloride 0.9% [Normal Saline] 1,000 ml Med  12/16/21 08:45 Active





 IV ASDIRECTED   


 


 Sodium Chloride 0.9% [Normal Saline] 1,000 ml Med  12/16/21 10:00 Active





 IV ASDIRECTED   


 


 Sodium Chloride 0.9% [Saline Flush] Med  12/16/21 08:36 Active





 10 ml FLUSH ASDIRECTED PRN   


 


 Vancomycin 1 gm Med  12/16/21 13:15 Active





 Vancomycin 250 mg   





 Sodium Chloride 0.9% [Normal Saline] 250 ml   





 IV ONETIME   


 


 Blood Culture x2 Reflex Set [OM.PC] Stat Oth  12/16/21 08:36 Ordered


 


 Saline Lock Insert [OM.PC] Stat Oth  12/16/21 08:36 Ordered


 


 EKG 12 Lead [EK] Stat Ther  12/16/21 08:36 Ordered








                                Medication Orders





Sodium Chloride (Normal Saline)  1,000 mls @ 126 mls/hr IV ASDIRECTED [facility]


   Last Admin: 12/16/21 09:16  Dose: 126 mls/hr


   Documented by: CHACORTA


Sodium Chloride (Normal Saline)  1,000 mls @ 250 mls/hr IV ASDIRECTED [facility]


   Last Admin: 12/16/21 10:14  Dose: 250 mls/hr


   Documented by: CHACORTA


Vancomycin HCl 1 gm/Vancomycin HCl 250 mg/ Sodium Chloride  250 mls @ 166 mls/hr

 IV ONETIME ONE


   Stop: 12/16/21 14:45


   Last Admin: 12/16/21 13:26  Dose: 166 mls/hr


   Documented by: CHACORTA


Sodium Chloride (Sodium Chloride 0.9% 10 Ml Syringe)  10 ml FLUSH ASDIRECTED PRN


   PRN Reason: Keep Vein Open


   Last Admin: 12/16/21 09:16  Dose: 10 ml


   Documented by: CHACORTA








Assessment/Plan Comment:: 


UTI, Urinary tract infectious disease


Prostate disorder


Recurrent UTI


Elevated lactic acid level


Sepsis


Generalized weakness


* Started on cefepime and vancomycin in ED given fairly recent hospitalization 

  for UTIcontinue with pharmacy to dose vancomycin


* Blood cultures pending


* Urine cultures pending


* Check procalcitonin


* Continue home Flomax


* As needed bladder scans


* Facility urinary retention protocol if needed


* IV fluids as indicated


* Repeat stat lactic acid with sepsis protocol every 3 hours until normal


* PT/OT


* Case management/social work consultation


* Will need urology follow-up after discharge


* Sepsis criteria: Leukocytosis, tachycardia and lactic acidosis-meets criteria.

    No hypotension noted





Dementia


* No acute concerns


* Untreated


* Let me sleep protocol





Chronic renal insufficiency, stage III (moderate)


* IV fluids as indicated


* Avoid nephrotoxic medications if able


* Baseline appears to be in the mid to upper ones for creatinine


* Monitor labs





Cerebral arterial aneurysm


Ptosis


* No acute concerns





Shingles


* No acute concerns


* Lesions have crusted over





AAA (abdominal aortic aneurysm)


* No acute concerns





COPD (chronic obstructive pulmonary disease)


* No acute concerns


* As needed DuoNebs


* PRN albuterol MDI





Chronic back pain


* PT/OT


* Pain medications as ordered





Code status: Full code


PCP: [facility]


DVT prophylaxis: Heparin Subcutaneous


Social: Patient resides at [facility]


Disposition: Admitted to hospital floor inpatient with telemetry for management 

of UTI and sepsis.  Likely length of stay 3 to 4 days pending culture results 

and progress.





 














- Mortality Measure


Prognosis:: Poor (Overall prognosis is poor given multiple comorbid conditions.)

## 2021-12-16 NOTE — CR
Left wrist: 3 views of the left wrist were obtained.

 

Comparison: No prior wrist study is available.

 

Severe degenerative change is seen within the CMC joint of the thumb. 

 Small bony densities are noted around this joint which are 

degenerative in etiology.

 

Joint space narrowing is also noted off the distal navicular bone.

 

Minimal chondrocalcinosis is noted within the triangular 

fibrocartilage.  Bony structures are osteopenic.

 

No acute fracture or other bony abnormality is appreciated.

 

Impression:

1.  Degenerative change as noted above with chondrocalcinosis.

2.  Osteopenia is present.

3.  No acute abnormality is appreciated.

 

Diagnostic code #2

## 2021-12-16 NOTE — CR
Chest: Portable view of the chest was obtained.

 

Comparison: Prior chest x-ray of 01/13/21.

 

Heart size is normal.  Upper mediastinum is within normal limits.  

Lucency is seen along the left chest which is most likely artifact.  

No definite acute parenchymal change within either lung.  Bony 

structures are slightly osteopenic but appear somewhat hyperinflated. 

 Mild degenerative change is seen within the spine.

 

Impression:

1.  Probable emphysematous change.

2.  Other findings felt to be incidental as described above.

3.  Nothing acute is appreciated.

 

Diagnostic code #2

## 2021-12-16 NOTE — EDM.PDOC
ED HPI GENERAL MEDICAL PROBLEM





- General


Chief Complaint: Fever


Stated Complaint: WEAK, SHAKING, AND NOT FEELING WELL


Time Seen by Provider: 12/16/21 08:33


Source of Information: Reports: Patient


History Limitations: Reports: No Limitations





- History of Present Illness


INITIAL COMMENTS - FREE TEXT/NARRATIVE: 


86-year-old male presents the emergency department from [facility] still living 

facility.  Patient reports to me that he has had the chills over the past few 

days.  Apparently assisted living facility states he has had increased confusion

as well.  Also noted tea colored urine for the past 3 to 4 days with associated 

symptoms.  States he has been voiding more frequently.  He also reports to me 

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  Patient 

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 

approximately 6 months ago.  As his pupils are unequal left greater than the 

right.  He states vision is decreased in the left eye.  He was offered surgical 

intervention for this and he elected not to do so.  Nursing staff also notifies 

me that the patient was found on the floor in the waiting room.  He tells me he 

accidentally slid out of his wheelchair.  He denies hitting his head or any loss

of consciousness however I do appreciate the area of swelling noted to the 

occipital scalp.  There is no bruising or redness.  Patient does not take any 

blood thinners.


  ** Lower Back


Pain Score (Numeric/FACES): 5





- Related Data


                                    Allergies











Allergy/AdvReac Type Severity Reaction Status Date / Time


 


No Known Allergies Allergy   Verified 12/16/21 08:35











Home Meds: 


                                    Home Meds





Cholecalciferol (Vitamin D3) [Vitamin D3] 2,000 unit PO DAILY 01/15/21 [History]


Docusate Sodium [Colace] 100 mg PO BID PRN #20 cap 01/19/21 [Rx]


Ferrous Sulfate [Iron] 325 mg PO DAILY 11/02/21 [History]


Sennosides/Docusate Sodium [Senna Plus 8.6-50 mg Tablet] 1 tab PO BID PRN 

11/02/21 [History]


Sennosides/Docusate Sodium [Senna Plus 8.6-50 mg Tablet] 2 tab PO QPM 11/02/21 

[History]


Tamsulosin [Flomax] 0.4 mg PO BEDTIME 11/02/21 [History]


Acetaminophen 650 mg PO QID PRN 12/16/21 [History]


L.acidoph,Paracasei, B.lactis [Probiotic] 1 cap PO DAILY 12/16/21 [History]











Past Medical History


HEENT History: Reports: Impaired Vision, Other (See Below)


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


Cardiovascular History: Reports: Hypertension, Other (See Below)


Other Cardiovascular History: abdominal aortic aneurysm


Respiratory History: Reports: COPD


Gastrointestinal History: Reports: Chronic Constipation, Other (See Below)


Other Gastrointestinal History: For past 1.5 years


Genitourinary History: Reports: BPH, Prostate Disorder, UTI, Recurrent


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

 states "made hole in bladder-had mixed feces and urine." pt states then it 

"released itself" confirmed by radiology.  states has 3 kidneys


Musculoskeletal History: Reports: Back Pain, Chronic


Neurological History: Reports: Other (See Below)


Other Neuro History: anursym in the head-to be sent to [place] Sept 11,2020


Psychiatric History: Reports: Dementia


Endocrine/Metabolic History: Reports: None


Hematologic History: Reports: None


Immunologic History: Reports: None


Oncologic (Cancer) History: Reports: None


Dermatologic History: Reports: Other (See Below)


Other Dermatologic History: currently has shingles





- Infectious Disease History


Infectious Disease History: Reports: Chicken Pox, Measles, Mumps, Shingles


Other Infectious Disease History: currently has shingles





- Past Surgical History


Head Surgeries/Procedures: Reports: None


HEENT Surgical History: Reports: Cataract Surgery, Other (See Below)


Other HEENT Surgeries/Procedures: Bilaterally


Cardiovascular Surgical History: Reports: None


Respiratory Surgical History: Reports: None


GI Surgical History: Reports: None


Male  Surgical History: Reports: None


Musculoskeletal Surgical History: Reports: Other (See Below)


Other Musculoskeletal Surgeries/Procedures:: laminectomy





Social & Family History





- Family History


Family Medical History: No Pertinent Family History


Cardiac: Reports: MI


Musculoskeletal: Reports: Back pain, Chronic





- Tobacco Use


Tobacco Use Status *Q: Current Some Day Tobacco User


Years of Tobacco use: 50


Packs/Tins Daily: 0.1





- Caffeine Use


Caffeine Use: Reports: Coffee, Soda





- Recreational Drug Use


Recreational Drug Use: No





- Living Situation & Occupation


Living situation: Reports: Single


Occupation: Retired





ED ROS GENERAL





- Review of Systems


Review Of Systems: Comprehensive ROS is negative, except as noted in HPI.





ED EXAM, GENERAL





- Physical Exam


Exam: See Below


Exam Limited By: No Limitations


General Appearance: Alert, WD/WN, No Apparent Distress


Eye Exam: Left Eye: Abnormal Pupil (Right pupil is 3 mm and reactive, left pupil

 is 5 mm)


Ears: Normal External Exam, Hearing Grossly Normal


Nose: Normal Inspection


Throat/Mouth: Normal Inspection, Normal Lips, Normal Voice, No Airway Compromise


Head: Other (Area of swelling noted to posterior parietal however there is no 

redness or bruising appreciated; patient denies any pain to this area)


Neck: Normal Inspection, Supple


Respiratory/Chest: No Respiratory Distress, Lungs Clear, Normal Breath Sounds, 

No Accessory Muscle Use, Chest Non-Tender


Cardiovascular: Normal Peripheral Pulses, Regular Rate, Rhythm, No Edema, No 

Murmur


Peripheral Pulses: 2+: Radial (L), Radial (R)


GI/Abdominal: Normal Bowel Sounds, Soft, Non-Tender, No Distention


 (Male) Exam: Deferred


Rectal (Males) Exam: Deferred


Back Exam: Normal Inspection, Full Range of Motion


Extremities: Normal Inspection, Normal Range of Motion, Non-Tender, No Pedal 

Edema, Normal Capillary Refill


Neurological: Alert, Oriented, Normal Cognition


Psychiatric: Normal Affect, Normal Mood


Skin Exam: Warm, Dry, Intact, Normal Color, No Rash


Lymphatic: No Adenopathy


  ** #1 Interpretation


EKG Date: 12/16/21


Time: 09:48


Rhythm: NSR


Rate (Beats/Min): 86


Axis: Normal


P-Wave: Present


QRS: Normal


ST-T: Normal


QT: Normal


EKG Interpretation Comments: 





Dr. Rutledge interpretation: Normal sinus rhythm at a rate of 86 bpm; PACs; 

prolonged WA interval; Q waves V2 through V3





Course





- Vital Signs


Text/Narrative:: 





Stated above patient presents with history of chills for the past 2 days.  At 

the time of my exam, the patient is tachycardic with systolic blood pressures in

 the 90s.  O2 saturations are 99% on room air.  He denies any other complaints. 

 Physical exam is essentially unremarkable.  Patient does meet sepsis criteria 

so will obtain a full septic work-up.


Last Recorded V/S: 


                                Last Vital Signs











Temp  98.1 F   12/20/21 16:24


 


Pulse  68   12/20/21 16:24


 


Resp  24 H  12/20/21 16:24


 


BP  124/74   12/20/21 16:24


 


Pulse Ox  94 L  12/20/21 16:24














- Orders/Labs/Meds


Orders: 


                                Medication Orders





Acetaminophen (Acetaminophen 325 Mg Tab)  650 mg PO Q4H PRN


   PRN Reason: Pain (Mild 1-3)/fever


   Last Admin: 12/20/21 09:49  Dose: 650 mg


   Documented by: JUAN


   Admin: 12/19/21 17:34  Dose: 650 mg


   Documented by: TERRI


Albuterol (Albuterol 6.7 Gm Inhaler)  0 gm INH Q2H PRN


   PRN Reason: SOB/Wheezing


Albuterol/Ipratropium (Albuterol/Ipratropium 3.0-0.5 Mg/3 Ml Neb Soln)  3 ml NEB

 QIDRT PRN


   PRN Reason: Shortness Of Breath/wheezing


Cholecalciferol (Cholecalciferol (Vitamin D3) 25 Mcg Tab)  50 mcg PO DAILY [facility]


   Last Admin: 12/20/21 09:33  Dose: 50 mcg


   Documented by: JUAN


   Admin: 12/19/21 08:25  Dose: 50 mcg


   Documented by: TYLOR


   Admin: 12/18/21 08:08  Dose: 50 mcg


   Documented by: TERRI


   Admin: 12/17/21 08:22  Dose: 50 mcg


   Documented by: JUAN


Ferrous Sulfate (Ferrous Sulfate 324 Mg Tab.Ec)  324 mg PO DAILY [facility]


   Last Admin: 12/20/21 09:33  Dose: 324 mg


   Documented by: JUAN


   Admin: 12/19/21 08:26  Dose: 324 mg


   Documented by: TYLOR


   Admin: 12/18/21 08:09  Dose: 324 mg


   Documented by: TERRI


   Admin: 12/17/21 08:22  Dose: 324 mg


   Documented by: JUAN


Heparin Sodium (Porcine) (Heparin Sodium 5,000 Units/Ml Vial)  5,000 units 

SUBCUT Q8H [facility]


   Last Admin: 12/20/21 16:58 Dose:  Not Given


   Documented by: JUAN


   Admin: 12/20/21 09:37 Dose:  Not Given


   Documented by: JUAN


   Admin: 12/20/21 00:31 Dose:  Not Given


   Documented by: ALAYNA


   Admin: 12/19/21 15:55 Dose:  Not Given


   Documented by: TYLOR


   Admin: 12/19/21 08:26 Dose:  Not Given


   Documented by: OTTOTAGABRIELA


   Admin: 12/19/21 00:21 Dose:  Not Given


   Documented by: ANAHIREB


   Admin: 12/18/21 16:34 Dose:  Not Given


   Documented by: KASSANDRAITDAM


   Admin: 12/18/21 08:09 Dose:  Not Given


   Documented by: MUITDAM


   Admin: 12/18/21 00:57 Dose:  Not Given


   Documented by: ANAHIREB


   Admin: 12/17/21 16:40 Dose:  Not Given


   Documented by: JUAN


   Admin: 12/17/21 08:20 Dose:  Not Given


   Documented by: JUAN


   Admin: 12/17/21 00:15 Dose:  Not Given


   Documented by: JEROMYCCAS


   Admin: 12/16/21 16:17  Dose: 5,000 units


   Documented by: TYLOR


Cefepime HCl 2 gm/ Sodium (Chloride)  50 mls @ 100 mls/hr IV Q12H [facility]


   Last Admin: 12/20/21 12:27  Dose: 100 mls/hr


   Documented by: JUAN


   Infusion: 12/20/21 01:36  Dose: 100 mls/hr


   Documented by: JUAN


   Admin: 12/20/21 01:06  Dose: 100 mls/hr


   Documented by: ALAYNA


   Infusion: 12/19/21 14:23  Dose: 100 mls/hr


   Documented by: ANAHIREANTONI


   Admin: 12/19/21 13:53  Dose: 100 mls/hr


   Documented by: OTTOTAGABRIELA


   Infusion: 12/19/21 00:51  Dose: 100 mls/hr


   Documented by: OTTOTAY


   Admin: 12/19/21 00:21  Dose: 100 mls/hr


   Documented by: ANAHIREB


   Infusion: 12/18/21 13:49  Dose: 100 mls/hr


   Documented by: ANAHIREB


   Admin: 12/18/21 13:19  Dose: 100 mls/hr


   Documented by: KASSANDRAITASHLEE


   Infusion: 12/18/21 02:43  Dose: 100 mls/hr


   Documented by: KASSANDRAITDAM


   Admin: 12/18/21 02:13  Dose: 100 mls/hr


   Documented by: ANAHIREB


   Infusion: 12/17/21 14:00  Dose: 100 mls/hr


   Documented by: ANAHIREB


   Admin: 12/17/21 13:30  Dose: 100 mls/hr


   Documented by: JUAN


   Infusion: 12/17/21 00:53  Dose: 100 mls/hr


   Documented by: JUAN


   Admin: 12/17/21 00:23  Dose: 100 mls/hr


   Documented by: BHAVANA


Vancomycin HCl 1 gm/Vancomycin HCl 250 mg/ Sodium Chloride  250 mls @ 166.667 

mls/hr IV Q24H [facility]


   Last Admin: 12/20/21 12:40  Dose: 166.667 mls/hr


   Documented by: JUAN


   Infusion: 12/19/21 16:06  Dose: 166.667 mls/hr


   Documented by: JUAN


   Admin: 12/19/21 14:36  Dose: 166.667 mls/hr


   Documented by: TYLOR


   Infusion: 12/18/21 16:02  Dose: 166.667 mls/hr


   Documented by: TYLOR


   Admin: 12/18/21 14:32  Dose: 166.667 mls/hr


   Documented by: TERRI


   Infusion: 12/17/21 14:56  Dose: 166.667 mls/hr


   Documented by: TERRI


   Admin: 12/17/21 13:26  Dose: 166.667 mls/hr


   Documented by: JUAN


Ondansetron HCl (Ondansetron 4 Mg/2 Ml Sdv)  4 mg IV Q6H PRN


   PRN Reason: Nausea/Vomiting


Oxycodone HCl (Oxycodone 5 Mg Tab)  5 mg PO Q4H PRN


   PRN Reason: Pain (moderate 4-6)


Saccharomyces Boulardii (Saccharomyces Boulardii (Probiotic) 250 Mg Cap)  250 mg

 PO DAILY [facility]


   Last Admin: 12/20/21 09:33  Dose: 250 mg


   Documented by: JUAN


   Admin: 12/19/21 08:26  Dose: 250 mg


   Documented by: TYLOR


   Admin: 12/18/21 08:09  Dose: 250 mg


   Documented by: TERRI


   Admin: 12/17/21 08:22  Dose: 250 mg


   Documented by: JUAN


Senna/Docusate Sodium (Docusate Sodium/Sennosides 50-8.6 Mg Tab)  1 tab PO BID 

PRN


   PRN Reason: Constipation


Senna/Docusate Sodium (Docusate Sodium/Sennosides 50-8.6 Mg Tab)  2 tab PO QPM 

[facility]


   Last Admin: 12/20/21 17:57  Dose: 2 tab


   Documented by: JUAN


   Admin: 12/19/21 17:13  Dose: 2 tab


   Documented by: TYLOR


   Admin: 12/18/21 17:33  Dose: 2 tab


   Documented by: TERRI


   Admin: 12/17/21 17:32 Dose:  Not Given


   Documented by: JUAN


   Admin: 12/16/21 17:37  Dose: 2 tab


   Documented by: TYLOR


Sodium Chloride (Sodium Chloride 0.9% 10 Ml Syringe)  10 ml FLUSH ASDIRECTED PRN


   PRN Reason: Keep Vein Open


   Last Admin: 12/16/21 09:16  Dose: 10 ml


   Documented by: CHACORTA


Tamsulosin HCl (Tamsulosin 0.4 Mg Cap.Er)  0.4 mg PO BEDTIME [facility]


   Last Admin: 12/19/21 21:11  Dose: 0.4 mg


   Documented by: ALAYNA


   Admin: 12/18/21 20:28  Dose: 0.4 mg


   Documented by: ALAYNA


   Admin: 12/17/21 21:45 Dose:  Not Given


   Documented by: ALAYNA


   Admin: 12/16/21 21:33  Dose: 0.4 mg


   Documented by: BHAVANA


Vancomycin HCl (Pharmacy To Dose - Vancomycin)  1 dose .XX ASDIRECTED PRN


   PRN Reason: RX TO DOSE VANCO








Labs: 


                                Laboratory Tests











  12/16/21 12/16/21 12/16/21 Range/Units





  08:55 08:55 08:55 


 


WBC  16.47 H    (4.23-9.07)  K/mm3


 


RBC  4.32 L    (4.63-6.08)  M/mm3


 


Hgb  13.5 L D    (13.7-17.5)  gm/dl


 


Hct  42.8    (40.1-51.0)  %


 


MCV  99.1 H    (79.0-92.2)  fl


 


MCH  31.3    (25.7-32.2)  pg


 


MCHC  31.5 L    (32.2-35.5)  g/dl


 


RDW Std Deviation  56.6 H    (35.1-43.9)  fL


 


Plt Count  281  D    (163-337)  K/mm3


 


MPV  9.1 L    (9.4-12.3)  fl


 


Neutrophils % (Manual)  84 H    (40-60)  %


 


Band Neutrophils %  10    (0-10)  %


 


Lymphocytes % (Manual)  3 L    (20-40)  %


 


Atypical Lymphs %  1    %


 


Monocytes % (Manual)  2    (2-10)  %


 


Eosinophils % (Manual)  0 L    (0.8-7.0)  %


 


Basophils % (Manual)  0 L    (0.2-1.2)  


 


Platelet Estimate  Adequate    


 


Plt Morphology Comment  Normal    


 


RBC Morph Comment  Normal    


 


PT   11.7   (9.7-12.0)  SECONDS


 


INR   1.06   


 


Sodium    139  (136-145)  mEq/L


 


Potassium    4.1  (3.5-5.1)  mEq/L


 


Chloride    103  ()  mEq/L


 


Carbon Dioxide    21  (21-32)  mEq/L


 


Anion Gap    19.1 H  (5-15)  


 


BUN    22 H  (7-18)  mg/dL


 


Creatinine    2.0 H  (0.7-1.3)  mg/dL


 


Est Cr Clr Drug Dosing    TNP  


 


Estimated GFR (MDRD)    32  (>60)  mL/min


 


BUN/Creatinine Ratio    11.0 L  (14-18)  


 


Glucose    127 H  (70-99)  mg/dL


 


Lactic Acid     (0.4-2.0)  mmol/L


 


Calcium    9.1  (8.5-10.1)  mg/dL


 


Total Bilirubin    0.6  (0.2-1.0)  mg/dL


 


AST    13 L  (15-37)  U/L


 


ALT    20  (16-63)  U/L


 


Alkaline Phosphatase    66  ()  U/L


 


C-Reactive Protein    2.6 H*  (<1.0)  mg/dL


 


Total Protein    7.5  (6.4-8.2)  g/dl


 


Albumin    3.1 L  (3.4-5.0)  g/dl


 


Globulin    4.4  gm/dL


 


Albumin/Globulin Ratio    0.7 L  (1-2)  


 


Urine Color     (Yellow)  


 


Urine Appearance     (Clear)  


 


Urine pH     (5.0-8.0)  


 


Ur Specific Gravity     (1.005-1.030)  


 


Urine Protein     (Negative)  


 


Urine Glucose (UA)     (Negative)  


 


Urine Ketones     (Negative)  


 


Urine Occult Blood     (Negative)  


 


Urine Nitrite     (Negative)  


 


Urine Bilirubin     (Negative)  


 


Urine Urobilinogen     (0.2-1.0)  


 


Ur Leukocyte Esterase     (Negative)  


 


Urine RBC     (0-5)  /hpf


 


Urine WBC     (0-5)  /hpf


 


Ur Epithelial Cells     (0-5)  /hpf


 


Urine Bacteria     (FEW)  /hpf


 


Urine Mucus     (FEW)  /hpf


 


SARS-CoV-2 RNA (MADDIE)     (NEGATIVE)  














  12/16/21 12/16/21 12/16/21 Range/Units





  08:55 09:16 09:45 


 


WBC     (4.23-9.07)  K/mm3


 


RBC     (4.63-6.08)  M/mm3


 


Hgb     (13.7-17.5)  gm/dl


 


Hct     (40.1-51.0)  %


 


MCV     (79.0-92.2)  fl


 


MCH     (25.7-32.2)  pg


 


MCHC     (32.2-35.5)  g/dl


 


RDW Std Deviation     (35.1-43.9)  fL


 


Plt Count     (163-337)  K/mm3


 


MPV     (9.4-12.3)  fl


 


Neutrophils % (Manual)     (40-60)  %


 


Band Neutrophils %     (0-10)  %


 


Lymphocytes % (Manual)     (20-40)  %


 


Atypical Lymphs %     %


 


Monocytes % (Manual)     (2-10)  %


 


Eosinophils % (Manual)     (0.8-7.0)  %


 


Basophils % (Manual)     (0.2-1.2)  


 


Platelet Estimate     


 


Plt Morphology Comment     


 


RBC Morph Comment     


 


PT     (9.7-12.0)  SECONDS


 


INR     


 


Sodium     (136-145)  mEq/L


 


Potassium     (3.5-5.1)  mEq/L


 


Chloride     ()  mEq/L


 


Carbon Dioxide     (21-32)  mEq/L


 


Anion Gap     (5-15)  


 


BUN     (7-18)  mg/dL


 


Creatinine     (0.7-1.3)  mg/dL


 


Est Cr Clr Drug Dosing     


 


Estimated GFR (MDRD)     (>60)  mL/min


 


BUN/Creatinine Ratio     (14-18)  


 


Glucose     (70-99)  mg/dL


 


Lactic Acid  4.1 H*    (0.4-2.0)  mmol/L


 


Calcium     (8.5-10.1)  mg/dL


 


Total Bilirubin     (0.2-1.0)  mg/dL


 


AST     (15-37)  U/L


 


ALT     (16-63)  U/L


 


Alkaline Phosphatase     ()  U/L


 


C-Reactive Protein     (<1.0)  mg/dL


 


Total Protein     (6.4-8.2)  g/dl


 


Albumin     (3.4-5.0)  g/dl


 


Globulin     gm/dL


 


Albumin/Globulin Ratio     (1-2)  


 


Urine Color    Yellow  (Yellow)  


 


Urine Appearance    Turbid H  (Clear)  


 


Urine pH    7.0  (5.0-8.0)  


 


Ur Specific Gravity    1.015  (1.005-1.030)  


 


Urine Protein    2+ H  (Negative)  


 


Urine Glucose (UA)    Negative  (Negative)  


 


Urine Ketones    Negative  (Negative)  


 


Urine Occult Blood    2+ H  (Negative)  


 


Urine Nitrite    Negative  (Negative)  


 


Urine Bilirubin    1+ H  (Negative)  


 


Urine Urobilinogen    0.2  (0.2-1.0)  


 


Ur Leukocyte Esterase    3+ H  (Negative)  


 


Urine RBC    50-75 H  (0-5)  /hpf


 


Urine WBC    Too numerous to cnt H  (0-5)  /hpf


 


Ur Epithelial Cells    0-5  (0-5)  /hpf


 


Urine Bacteria    Many H  (FEW)  /hpf


 


Urine Mucus    Moderate H  (FEW)  /hpf


 


SARS-CoV-2 RNA (MADDIE)   Negative   (NEGATIVE)  














  12/16/21 Range/Units





  11:58 


 


WBC   (4.23-9.07)  K/mm3


 


RBC   (4.63-6.08)  M/mm3


 


Hgb   (13.7-17.5)  gm/dl


 


Hct   (40.1-51.0)  %


 


MCV   (79.0-92.2)  fl


 


MCH   (25.7-32.2)  pg


 


MCHC   (32.2-35.5)  g/dl


 


RDW Std Deviation   (35.1-43.9)  fL


 


Plt Count   (163-337)  K/mm3


 


MPV   (9.4-12.3)  fl


 


Neutrophils % (Manual)   (40-60)  %


 


Band Neutrophils %   (0-10)  %


 


Lymphocytes % (Manual)   (20-40)  %


 


Atypical Lymphs %   %


 


Monocytes % (Manual)   (2-10)  %


 


Eosinophils % (Manual)   (0.8-7.0)  %


 


Basophils % (Manual)   (0.2-1.2)  


 


Platelet Estimate   


 


Plt Morphology Comment   


 


RBC Morph Comment   


 


PT   (9.7-12.0)  SECONDS


 


INR   


 


Sodium   (136-145)  mEq/L


 


Potassium   (3.5-5.1)  mEq/L


 


Chloride   ()  mEq/L


 


Carbon Dioxide   (21-32)  mEq/L


 


Anion Gap   (5-15)  


 


BUN   (7-18)  mg/dL


 


Creatinine   (0.7-1.3)  mg/dL


 


Est Cr Clr Drug Dosing   


 


Estimated GFR (MDRD)   (>60)  mL/min


 


BUN/Creatinine Ratio   (14-18)  


 


Glucose   (70-99)  mg/dL


 


Lactic Acid  3.5 H*  (0.4-2.0)  mmol/L


 


Calcium   (8.5-10.1)  mg/dL


 


Total Bilirubin   (0.2-1.0)  mg/dL


 


AST   (15-37)  U/L


 


ALT   (16-63)  U/L


 


Alkaline Phosphatase   ()  U/L


 


C-Reactive Protein   (<1.0)  mg/dL


 


Total Protein   (6.4-8.2)  g/dl


 


Albumin   (3.4-5.0)  g/dl


 


Globulin   gm/dL


 


Albumin/Globulin Ratio   (1-2)  


 


Urine Color   (Yellow)  


 


Urine Appearance   (Clear)  


 


Urine pH   (5.0-8.0)  


 


Ur Specific Gravity   (1.005-1.030)  


 


Urine Protein   (Negative)  


 


Urine Glucose (UA)   (Negative)  


 


Urine Ketones   (Negative)  


 


Urine Occult Blood   (Negative)  


 


Urine Nitrite   (Negative)  


 


Urine Bilirubin   (Negative)  


 


Urine Urobilinogen   (0.2-1.0)  


 


Ur Leukocyte Esterase   (Negative)  


 


Urine RBC   (0-5)  /hpf


 


Urine WBC   (0-5)  /hpf


 


Ur Epithelial Cells   (0-5)  /hpf


 


Urine Bacteria   (FEW)  /hpf


 


Urine Mucus   (FEW)  /hpf


 


SARS-CoV-2 RNA (MADDIE)   (NEGATIVE)  











Meds: 


Medications











Generic Name Dose Route Start Last Admin





  Trade Name Freq  PRN Reason Stop Dose Admin


 


Acetaminophen  650 mg  12/16/21 14:24  12/20/21 09:49





  Acetaminophen 325 Mg Tab  PO   650 mg





  Q4H PRN   Administration





  Pain (Mild 1-3)/fever  


 


Albuterol  0 gm  12/16/21 15:19 





  Albuterol 6.7 Gm Inhaler  INH  





  Q2H PRN  





  SOB/Wheezing  


 


Albuterol/Ipratropium  3 ml  12/16/21 15:19 





  Albuterol/Ipratropium 3.0-0.5 Mg/3 Ml Neb Soln  NEB  





  QIDRT PRN  





  Shortness Of Breath/wheezing  


 


Cholecalciferol  50 mcg  12/17/21 09:00  12/20/21 09:33





  Cholecalciferol (Vitamin D3) 25 Mcg Tab  PO   50 mcg





  DAILY [facility]   Administration


 


Ferrous Sulfate  324 mg  12/17/21 09:00  12/20/21 09:33





  Ferrous Sulfate 324 Mg Tab.Ec  PO   324 mg





  DAILY [facility]   Administration


 


Heparin Sodium (Porcine)  5,000 units  12/16/21 16:00  12/20/21 16:58





  Heparin Sodium 5,000 Units/Ml Vial  SUBCUT   Not Given





  Q8H CHE  


 


Cefepime HCl 2 gm/ Sodium  50 mls @ 100 mls/hr  12/17/21 01:00  12/20/21 12:27





  Chloride  IV   100 mls/hr





  Q12H CHE   Administration


 


Vancomycin HCl 1 gm/  250 mls @ 166.667 mls/hr  12/17/21 13:30  12/20/21 12:40





Vancomycin HCl 250 mg/ Sodium  IV   166.667 mls/hr





Chloride  Q24H CHE   Administration


 


Ondansetron HCl  4 mg  12/16/21 14:24 





  Ondansetron 4 Mg/2 Ml Sdv  IV  





  Q6H PRN  





  Nausea/Vomiting  


 


Oxycodone HCl  5 mg  12/16/21 15:19 





  Oxycodone 5 Mg Tab  PO  





  Q4H PRN  





  Pain (moderate 4-6)  


 


Saccharomyces Boulardii  250 mg  12/17/21 09:00  12/20/21 09:33





  Saccharomyces Boulardii (Probiotic) 250 Mg Cap  PO   250 mg





  DAILY CHE   Administration


 


Senna/Docusate Sodium  1 tab  12/16/21 14:24 





  Docusate Sodium/Sennosides 50-8.6 Mg Tab  PO  





  BID PRN  





  Constipation  


 


Senna/Docusate Sodium  2 tab  12/16/21 18:00  12/20/21 17:57





  Docusate Sodium/Sennosides 50-8.6 Mg Tab  PO   2 tab





  QPM CHE   Administration


 


Sodium Chloride  10 ml  12/16/21 08:36  12/16/21 09:16





  Sodium Chloride 0.9% 10 Ml Syringe  FLUSH   10 ml





  ASDIRECTED PRN   Administration





  Keep Vein Open  


 


Tamsulosin HCl  0.4 mg  12/16/21 21:00  12/19/21 21:11





  Tamsulosin 0.4 Mg Cap.Er  PO   0.4 mg





  BEDTIME CHE   Administration


 


Vancomycin HCl  1 dose  12/16/21 14:30 





  Pharmacy To Dose - Vancomycin  .XX  





  ASDIRECTED PRN  





  RX TO DOSE VANCO  














Discontinued Medications














Generic Name Dose Route Start Last Admin





  Trade Name Freq  PRN Reason Stop Dose Admin


 


Acetaminophen  650 mg  12/16/21 15:19 





  Acetaminophen 325 Mg Tab  PO  





  Q4H PRN  





  Pain (Mild 1-3)/fever  


 


Albuterol  0 gm  12/16/21 15:06 





  Albuterol 6.7 Gm Inhaler  INH  





  Q2H PRN  





  SOB/Wheezing  


 


Albuterol/Ipratropium  3 ml  12/16/21 15:05 





  Albuterol/Ipratropium 3.0-0.5 Mg/3 Ml Neb Soln  NEB  





  Q6HRRT PRN  





  wheezing/SOB/cough  


 


Cholecalciferol  50 mcg  12/17/21 09:00 





  Cholecalciferol (Vitamin D3) 25 Mcg Tab  PO  





  DAILY CHE  


 


Ferrous Sulfate  324 mg  12/17/21 09:00 





  Ferrous Sulfate 324 Mg Tab.Ec  PO  





  DAILY CHE  


 


Heparin Sodium (Porcine)  5,000 units  12/16/21 16:00 





  Heparin Sodium 5,000 Units/Ml Vial  SUBCUT  





  Q8H CHE  


 


Sodium Chloride  1,000 mls @ 126 mls/hr  12/16/21 08:45  12/16/21 09:16





  Normal Saline  IV   126 mls/hr





  ASDIRECTED CHE   Administration


 


Sodium Chloride  1,000 mls @ 250 mls/hr  12/16/21 10:00  12/16/21 10:14





  Normal Saline  IV   250 mls/hr





  ASDIRECTED CHE   Administration


 


Cefepime HCl 2 gm/ Sodium  50 mls @ 100 mls/hr  12/16/21 12:22  12/16/21 12:36





  Chloride  IV  12/16/21 12:51  100 mls/hr





  ONETIME ONE   Administration


 


Vancomycin HCl 1 gm/  250 mls @ 166 mls/hr  12/16/21 13:15  12/16/21 13:26





Vancomycin HCl 250 mg/ Sodium  IV  12/16/21 14:45  166 mls/hr





Chloride  ONETIME ONE   Administration


 


Lactated Ringer's  1,000 mls @ 250 mls/hr  12/16/21 15:30  12/16/21 16:56





  Ringers, Lactated  IV   50 mls/hr





  ASDIRECTED CHE   Infusion


 


Cefepime HCl 2 gm/ Sodium  50 mls @ 100 mls/hr  12/17/21 01:00 





  Chloride  IV  





  Q12H CHE  


 


Vancomycin HCl 1 gm/  250 mls @ 166.667 mls/hr  12/17/21 13:30 





Vancomycin HCl 250 mg/ Sodium  IV  





Chloride  Q24H CHE  


 


Lactated Ringer's  1,000 mls @ 50 mls/hr  12/16/21 16:30  12/17/21 11:24





  Ringers, Lactated  IV   50 mls/hr





  ASDIRECTED CHE   Administration


 


Lidocaine HCl  10 ml  12/16/21 11:44  12/16/21 12:42





  Lidocaine 2% Jelly 10 Ml Urojet  MUCMEM  12/16/21 11:45  Not Given





  ONETIME ONE  


 


Magnesium Hydroxide  30 ml  12/17/21 06:00  12/17/21 05:16





  Magnesium Hydroxide 400 Mg/5 Ml Susp 30 Ml Cup  PO  12/17/21 06:01  Not Given





  ONETIME ONE  


 


Ondansetron HCl  4 mg  12/16/21 15:19 





  Ondansetron 4 Mg/2 Ml Sdv  IV  





  Q4H PRN  





  Nausea/Vomiting  


 


Saccharomyces Boulardii  250 mg  12/17/21 09:00 





  Saccharomyces Boulardii (Probiotic) 250 Mg Cap  PO  





  DAILY CHE  


 


Senna/Docusate Sodium  1 tab  12/16/21 15:19 





  Docusate Sodium/Sennosides 50-8.6 Mg Tab  PO  





  BID PRN  





  Constipation  


 


Senna/Docusate Sodium  2 tab  12/16/21 18:00 





  Docusate Sodium/Sennosides 50-8.6 Mg Tab  PO  





  QPM CHE  


 


Tamsulosin HCl  0.4 mg  12/16/21 21:00 





  Tamsulosin 0.4 Mg Cap.Er  PO  





  BEDTIME CHE  


 


Vancomycin HCl  1 dose  12/16/21 12:45  12/16/21 13:22





  Pharmacy To Dose - Vancomycin  .XX  12/16/21 12:46  Not Given





  ASDIRECTED ONE  


 


Vancomycin HCl  1 dose  12/16/21 15:30 





  Pharmacy To Dose - Vancomycin  .XX  





  ASDIRECTED PRN  





  RX TO DOSE VANCO  














- Re-Assessments/Exams


Free Text/Narrative Re-Assessment/Exam: 





12/16/21 09:53


Lab calls to notify me that the patient's lactic acid level is 4.1.  We will 

change IV fluids to normal saline at 250 mL's per hour.  Lactic acid will be 

rechecked in 3 hours.


12/16/21 10:28


Radiologist impression portable view chest: Heart size is normal.  Upper 

mediastinum is within normal limits.  Lucency is seen along the left chest which

 is most likely artifact.  No definite acute parenchymal change within either danny

ng.  Bony structures are slightly osteopenic but appear somewhat hyperinflated. 

 Mild degenerative changes seen within the spine.





Impression: 1.  Probable emphysematous change.


2.  Other findings felt to be incidental as described above.


3.  Nothing acute is appreciated





Radiologist impression 3 view of the left wrist: Severe degenerative changes 

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  Small bony densities are noted around 

this joint which are degenerative in etiology.





Joint space narrowing is also noted off the distal navicular bone.





Minimal chondrocalcinosis is noted within the triangular fibrocartilage.  Bony 

structures are osteopenic.





No acute fracture or other bony abnormality is appreciated.





Impression: 1.  Degenerative change as noted above with the chondrocalcinosis.


2.  Osteopenia is present.


3.  No acute abnormality is appreciated.


12/16/21 11:00


Hematology reveals a WBC of 16.47, hemoglobin 13.5, hematocrit 42.8, platelet 

count 281, neutrophil percentage 84





Coagulation reveals a pro time 11.7, INR of 1.06





Chemistry reveals a sodium of 139, potassium 4.1, carbon dioxide 21, anion gap 

19.1, BUN 22, creatinine 2.0, glucose 127, lactic acid 4.1, AST 13, ALT 20, C-

reactive protein 2.6





Patient is Covid negative


12/16/21 11:49


Nursing staff notifies me that the patient has attempted to void several times 

and has been unable.  I have ordered for them to straight cath the patient for 

urine as we do need to start antibiotic therapy for this patient.


12/16/21 12:22


Discussed the case with Dr. Duran and he requests that we receive urine on 

this patient before he will accept him into the hospital.  Recommends that once 

we do that we give him cefepime as an antibiotic as the patient was recently on 

Rocephin for treatment of UTI.


12/16/21 12:42


Again discussed the case with Dr. Duran now that we have collected urine on 

the patient.  He request that I have pharmacy dose vancomycin.  He will be 

admitted to the hospital.





Departure





- Departure


Time of Disposition: 12:43


Disposition: Admitted As Inpatient 66


Condition: Fair


Clinical Impression: 


 UTI, Urinary tract infectious disease








- Discharge Information





Sepsis Event Note (ED)





- Evaluation


Sepsis Screening Result: No Definite Risk

## 2021-12-17 NOTE — PCM.PN
- General Info


Date of Service: 12/17/21


Admission Dx/Problem (Free Text): 


                           Admission Diagnosis/Problem





Admission Diagnosis/Problem      Urosepsis








Functional Status: Reports: Pain Controlled, Tolerating Diet, Ambulating, 

Urinating.  Denies: New Symptoms





- Review of Systems


General: Reports: Weakness.  Denies: Fever, Fatigue, Malaise, Chills


HEENT: Reports: No Symptoms, Other (left eye ptosis - chronic; Left eye 

blindness).  Denies: Headaches, Sore Throat


Pulmonary: Reports: No Symptoms.  Denies: Shortness of Breath, Cough, Sputum, 

Wheezing


Cardiovascular: Reports: No Symptoms.  Denies: Chest Pain, Palpitations


Gastrointestinal: Reports: No Symptoms.  Denies: Abdominal Pain, Constipation, 

Diarrhea, Nausea, Vomiting


Genitourinary: Reports: Dysuria, Frequency, Retention.  Denies: Hematuria


Musculoskeletal: Reports: No Symptoms


Skin: Reports: Other (herpez zoster lesions on back - crusted over).  Denies: 

Cyanosis


Neurological: Reports: Confusion, Pre-Existing Deficit, Difficulty Walking.  

Denies: Headache, Numbness, Syncope, Tingling, Weakness


Psychiatric: Reports: No Symptoms





- Patient Data


Vitals - Most Recent: 


                                Last Vital Signs











Temp  97.9 F   12/17/21 03:37


 


Pulse  66   12/17/21 03:37


 


Resp  15   12/17/21 03:37


 


BP  93/59 L  12/17/21 03:37


 


Pulse Ox  94 L  12/17/21 03:37











Weight - Most Recent: 142 lb 3.2 oz


I&O - Last 24 Hours: 


                                 Intake & Output











 12/16/21 12/17/21 12/17/21





 22:59 06:59 14:59


 


Intake Total 180 1233 


 


Output Total 50 500 


 


Balance 130 733 











Lab Results Last 24 Hours: 


                         Laboratory Results - last 24 hr











  12/16/21 12/16/21 12/16/21 Range/Units





  08:55 08:55 08:55 


 


WBC  16.47 H    (4.23-9.07)  K/mm3


 


RBC  4.32 L    (4.63-6.08)  M/mm3


 


Hgb  13.5 L D    (13.7-17.5)  gm/dl


 


Hct  42.8    (40.1-51.0)  %


 


MCV  99.1 H    (79.0-92.2)  fl


 


MCH  31.3    (25.7-32.2)  pg


 


MCHC  31.5 L    (32.2-35.5)  g/dl


 


RDW Std Deviation  56.6 H    (35.1-43.9)  fL


 


Plt Count  281  D    (163-337)  K/mm3


 


MPV  9.1 L    (9.4-12.3)  fl


 


Neut % (Auto)     (34.0-67.9)  %


 


Lymph % (Auto)     (21.8-53.1)  %


 


Mono % (Auto)     (5.3-12.2)  %


 


Eos % (Auto)     (0.8-7.0)  


 


Baso % (Auto)     (0.1-1.2)  %


 


Neut # (Auto)     (1.78-5.38)  K/mm3


 


Lymph # (Auto)     (1.32-3.57)  K/mm3


 


Mono # (Auto)     (0.30-0.82)  K/mm3


 


Eos # (Auto)     (0.04-0.54)  K/mm3


 


Baso # (Auto)     (0.01-0.08)  K/mm3


 


Neutrophils % (Manual)  84 H    (40-60)  %


 


Band Neutrophils %  10    (0-10)  %


 


Lymphocytes % (Manual)  3 L    (20-40)  %


 


Atypical Lymphs %  1    %


 


Monocytes % (Manual)  2    (2-10)  %


 


Eosinophils % (Manual)  0 L    (0.8-7.0)  %


 


Basophils % (Manual)  0 L    (0.2-1.2)  


 


Platelet Estimate  Adequate    


 


Plt Morphology Comment  Normal    


 


RBC Morph Comment  Normal    


 


PT   11.7   (9.7-12.0)  SECONDS


 


INR   1.06   


 


Sodium    139  (136-145)  mEq/L


 


Potassium    4.1  (3.5-5.1)  mEq/L


 


Chloride    103  ()  mEq/L


 


Carbon Dioxide    21  (21-32)  mEq/L


 


Anion Gap    19.1 H  (5-15)  


 


BUN    22 H  (7-18)  mg/dL


 


Creatinine    2.0 H  (0.7-1.3)  mg/dL


 


Est Cr Clr Drug Dosing    TNP  


 


Estimated GFR (MDRD)    32  (>60)  mL/min


 


BUN/Creatinine Ratio    11.0 L  (14-18)  


 


Glucose    127 H  (70-99)  mg/dL


 


Lactic Acid     (0.4-2.0)  mmol/L


 


Calcium    9.1  (8.5-10.1)  mg/dL


 


Total Bilirubin    0.6  (0.2-1.0)  mg/dL


 


AST    13 L  (15-37)  U/L


 


ALT    20  (16-63)  U/L


 


Alkaline Phosphatase    66  ()  U/L


 


C-Reactive Protein    2.6 H*  (<1.0)  mg/dL


 


Total Protein    7.5  (6.4-8.2)  g/dl


 


Albumin    3.1 L  (3.4-5.0)  g/dl


 


Globulin    4.4  gm/dL


 


Albumin/Globulin Ratio    0.7 L  (1-2)  


 


Urine Color     (Yellow)  


 


Urine Appearance     (Clear)  


 


Urine pH     (5.0-8.0)  


 


Ur Specific Gravity     (1.005-1.030)  


 


Urine Protein     (Negative)  


 


Urine Glucose (UA)     (Negative)  


 


Urine Ketones     (Negative)  


 


Urine Occult Blood     (Negative)  


 


Urine Nitrite     (Negative)  


 


Urine Bilirubin     (Negative)  


 


Urine Urobilinogen     (0.2-1.0)  


 


Ur Leukocyte Esterase     (Negative)  


 


Urine RBC     (0-5)  /hpf


 


Urine WBC     (0-5)  /hpf


 


Ur Epithelial Cells     (0-5)  /hpf


 


Urine Bacteria     (FEW)  /hpf


 


Urine Mucus     (FEW)  /hpf


 


SARS-CoV-2 RNA (MADDIE)     (NEGATIVE)  


 


MRSA (PCR)     














  12/16/21 12/16/21 12/16/21 Range/Units





  08:55 09:16 09:45 


 


WBC     (4.23-9.07)  K/mm3


 


RBC     (4.63-6.08)  M/mm3


 


Hgb     (13.7-17.5)  gm/dl


 


Hct     (40.1-51.0)  %


 


MCV     (79.0-92.2)  fl


 


MCH     (25.7-32.2)  pg


 


MCHC     (32.2-35.5)  g/dl


 


RDW Std Deviation     (35.1-43.9)  fL


 


Plt Count     (163-337)  K/mm3


 


MPV     (9.4-12.3)  fl


 


Neut % (Auto)     (34.0-67.9)  %


 


Lymph % (Auto)     (21.8-53.1)  %


 


Mono % (Auto)     (5.3-12.2)  %


 


Eos % (Auto)     (0.8-7.0)  


 


Baso % (Auto)     (0.1-1.2)  %


 


Neut # (Auto)     (1.78-5.38)  K/mm3


 


Lymph # (Auto)     (1.32-3.57)  K/mm3


 


Mono # (Auto)     (0.30-0.82)  K/mm3


 


Eos # (Auto)     (0.04-0.54)  K/mm3


 


Baso # (Auto)     (0.01-0.08)  K/mm3


 


Neutrophils % (Manual)     (40-60)  %


 


Band Neutrophils %     (0-10)  %


 


Lymphocytes % (Manual)     (20-40)  %


 


Atypical Lymphs %     %


 


Monocytes % (Manual)     (2-10)  %


 


Eosinophils % (Manual)     (0.8-7.0)  %


 


Basophils % (Manual)     (0.2-1.2)  


 


Platelet Estimate     


 


Plt Morphology Comment     


 


RBC Morph Comment     


 


PT     (9.7-12.0)  SECONDS


 


INR     


 


Sodium     (136-145)  mEq/L


 


Potassium     (3.5-5.1)  mEq/L


 


Chloride     ()  mEq/L


 


Carbon Dioxide     (21-32)  mEq/L


 


Anion Gap     (5-15)  


 


BUN     (7-18)  mg/dL


 


Creatinine     (0.7-1.3)  mg/dL


 


Est Cr Clr Drug Dosing     


 


Estimated GFR (MDRD)     (>60)  mL/min


 


BUN/Creatinine Ratio     (14-18)  


 


Glucose     (70-99)  mg/dL


 


Lactic Acid  4.1 H*    (0.4-2.0)  mmol/L


 


Calcium     (8.5-10.1)  mg/dL


 


Total Bilirubin     (0.2-1.0)  mg/dL


 


AST     (15-37)  U/L


 


ALT     (16-63)  U/L


 


Alkaline Phosphatase     ()  U/L


 


C-Reactive Protein     (<1.0)  mg/dL


 


Total Protein     (6.4-8.2)  g/dl


 


Albumin     (3.4-5.0)  g/dl


 


Globulin     gm/dL


 


Albumin/Globulin Ratio     (1-2)  


 


Urine Color    Yellow  (Yellow)  


 


Urine Appearance    Turbid H  (Clear)  


 


Urine pH    7.0  (5.0-8.0)  


 


Ur Specific Gravity    1.015  (1.005-1.030)  


 


Urine Protein    2+ H  (Negative)  


 


Urine Glucose (UA)    Negative  (Negative)  


 


Urine Ketones    Negative  (Negative)  


 


Urine Occult Blood    2+ H  (Negative)  


 


Urine Nitrite    Negative  (Negative)  


 


Urine Bilirubin    1+ H  (Negative)  


 


Urine Urobilinogen    0.2  (0.2-1.0)  


 


Ur Leukocyte Esterase    3+ H  (Negative)  


 


Urine RBC    50-75 H  (0-5)  /hpf


 


Urine WBC    Too numerous to cnt H  (0-5)  /hpf


 


Ur Epithelial Cells    0-5  (0-5)  /hpf


 


Urine Bacteria    Many H  (FEW)  /hpf


 


Urine Mucus    Moderate H  (FEW)  /hpf


 


SARS-CoV-2 RNA (MADDIE)   Negative   (NEGATIVE)  


 


MRSA (PCR)     














  12/16/21 12/16/21 12/16/21 Range/Units





  11:58 15:15 16:24 


 


WBC     (4.23-9.07)  K/mm3


 


RBC     (4.63-6.08)  M/mm3


 


Hgb     (13.7-17.5)  gm/dl


 


Hct     (40.1-51.0)  %


 


MCV     (79.0-92.2)  fl


 


MCH     (25.7-32.2)  pg


 


MCHC     (32.2-35.5)  g/dl


 


RDW Std Deviation     (35.1-43.9)  fL


 


Plt Count     (163-337)  K/mm3


 


MPV     (9.4-12.3)  fl


 


Neut % (Auto)     (34.0-67.9)  %


 


Lymph % (Auto)     (21.8-53.1)  %


 


Mono % (Auto)     (5.3-12.2)  %


 


Eos % (Auto)     (0.8-7.0)  


 


Baso % (Auto)     (0.1-1.2)  %


 


Neut # (Auto)     (1.78-5.38)  K/mm3


 


Lymph # (Auto)     (1.32-3.57)  K/mm3


 


Mono # (Auto)     (0.30-0.82)  K/mm3


 


Eos # (Auto)     (0.04-0.54)  K/mm3


 


Baso # (Auto)     (0.01-0.08)  K/mm3


 


Neutrophils % (Manual)     (40-60)  %


 


Band Neutrophils %     (0-10)  %


 


Lymphocytes % (Manual)     (20-40)  %


 


Atypical Lymphs %     %


 


Monocytes % (Manual)     (2-10)  %


 


Eosinophils % (Manual)     (0.8-7.0)  %


 


Basophils % (Manual)     (0.2-1.2)  


 


Platelet Estimate     


 


Plt Morphology Comment     


 


RBC Morph Comment     


 


PT     (9.7-12.0)  SECONDS


 


INR     


 


Sodium     (136-145)  mEq/L


 


Potassium     (3.5-5.1)  mEq/L


 


Chloride     ()  mEq/L


 


Carbon Dioxide     (21-32)  mEq/L


 


Anion Gap     (5-15)  


 


BUN     (7-18)  mg/dL


 


Creatinine     (0.7-1.3)  mg/dL


 


Est Cr Clr Drug Dosing     


 


Estimated GFR (MDRD)     (>60)  mL/min


 


BUN/Creatinine Ratio     (14-18)  


 


Glucose     (70-99)  mg/dL


 


Lactic Acid  3.5 H*  1.9   (0.4-2.0)  mmol/L


 


Calcium     (8.5-10.1)  mg/dL


 


Total Bilirubin     (0.2-1.0)  mg/dL


 


AST     (15-37)  U/L


 


ALT     (16-63)  U/L


 


Alkaline Phosphatase     ()  U/L


 


C-Reactive Protein     (<1.0)  mg/dL


 


Total Protein     (6.4-8.2)  g/dl


 


Albumin     (3.4-5.0)  g/dl


 


Globulin     gm/dL


 


Albumin/Globulin Ratio     (1-2)  


 


Urine Color     (Yellow)  


 


Urine Appearance     (Clear)  


 


Urine pH     (5.0-8.0)  


 


Ur Specific Gravity     (1.005-1.030)  


 


Urine Protein     (Negative)  


 


Urine Glucose (UA)     (Negative)  


 


Urine Ketones     (Negative)  


 


Urine Occult Blood     (Negative)  


 


Urine Nitrite     (Negative)  


 


Urine Bilirubin     (Negative)  


 


Urine Urobilinogen     (0.2-1.0)  


 


Ur Leukocyte Esterase     (Negative)  


 


Urine RBC     (0-5)  /hpf


 


Urine WBC     (0-5)  /hpf


 


Ur Epithelial Cells     (0-5)  /hpf


 


Urine Bacteria     (FEW)  /hpf


 


Urine Mucus     (FEW)  /hpf


 


SARS-CoV-2 RNA (MADDIE)     (NEGATIVE)  


 


MRSA (PCR)    Negative  














  12/17/21 12/17/21 Range/Units





  06:10 06:10 


 


WBC  6.93   (4.23-9.07)  K/mm3


 


RBC  3.88 L   (4.63-6.08)  M/mm3


 


Hgb  12.0 L D   (13.7-17.5)  gm/dl


 


Hct  38.6 L   (40.1-51.0)  %


 


MCV  99.5 H   (79.0-92.2)  fl


 


MCH  30.9   (25.7-32.2)  pg


 


MCHC  31.1 L   (32.2-35.5)  g/dl


 


RDW Std Deviation  56.9 H   (35.1-43.9)  fL


 


Plt Count  214   (163-337)  K/mm3


 


MPV  9.7   (9.4-12.3)  fl


 


Neut % (Auto)  67.8   (34.0-67.9)  %


 


Lymph % (Auto)  16.7 L   (21.8-53.1)  %


 


Mono % (Auto)  13.0 H   (5.3-12.2)  %


 


Eos % (Auto)  1.7   (0.8-7.0)  


 


Baso % (Auto)  0.7   (0.1-1.2)  %


 


Neut # (Auto)  4.69   (1.78-5.38)  K/mm3


 


Lymph # (Auto)  1.16 L   (1.32-3.57)  K/mm3


 


Mono # (Auto)  0.90 H   (0.30-0.82)  K/mm3


 


Eos # (Auto)  0.12   (0.04-0.54)  K/mm3


 


Baso # (Auto)  0.05   (0.01-0.08)  K/mm3


 


Neutrophils % (Manual)    (40-60)  %


 


Band Neutrophils %    (0-10)  %


 


Lymphocytes % (Manual)    (20-40)  %


 


Atypical Lymphs %    %


 


Monocytes % (Manual)    (2-10)  %


 


Eosinophils % (Manual)    (0.8-7.0)  %


 


Basophils % (Manual)    (0.2-1.2)  


 


Platelet Estimate    


 


Plt Morphology Comment    


 


RBC Morph Comment    


 


PT    (9.7-12.0)  SECONDS


 


INR    


 


Sodium   137  (136-145)  mEq/L


 


Potassium   4.2  (3.5-5.1)  mEq/L


 


Chloride   105  ()  mEq/L


 


Carbon Dioxide   21  (21-32)  mEq/L


 


Anion Gap   15.2 H  (5-15)  


 


BUN   22 H  (7-18)  mg/dL


 


Creatinine   1.6 H  (0.7-1.3)  mg/dL


 


Est Cr Clr Drug Dosing   30.23  


 


Estimated GFR (MDRD)   41  (>60)  mL/min


 


BUN/Creatinine Ratio   13.8 L  (14-18)  


 


Glucose   104 H  (70-99)  mg/dL


 


Lactic Acid    (0.4-2.0)  mmol/L


 


Calcium   8.9  (8.5-10.1)  mg/dL


 


Total Bilirubin    (0.2-1.0)  mg/dL


 


AST    (15-37)  U/L


 


ALT    (16-63)  U/L


 


Alkaline Phosphatase    ()  U/L


 


C-Reactive Protein   8.8 H*  (<1.0)  mg/dL


 


Total Protein    (6.4-8.2)  g/dl


 


Albumin    (3.4-5.0)  g/dl


 


Globulin    gm/dL


 


Albumin/Globulin Ratio    (1-2)  


 


Urine Color    (Yellow)  


 


Urine Appearance    (Clear)  


 


Urine pH    (5.0-8.0)  


 


Ur Specific Gravity    (1.005-1.030)  


 


Urine Protein    (Negative)  


 


Urine Glucose (UA)    (Negative)  


 


Urine Ketones    (Negative)  


 


Urine Occult Blood    (Negative)  


 


Urine Nitrite    (Negative)  


 


Urine Bilirubin    (Negative)  


 


Urine Urobilinogen    (0.2-1.0)  


 


Ur Leukocyte Esterase    (Negative)  


 


Urine RBC    (0-5)  /hpf


 


Urine WBC    (0-5)  /hpf


 


Ur Epithelial Cells    (0-5)  /hpf


 


Urine Bacteria    (FEW)  /hpf


 


Urine Mucus    (FEW)  /hpf


 


SARS-CoV-2 RNA (MADDIE)    (NEGATIVE)  


 


MRSA (PCR)    











Med Orders - Current: 


                               Current Medications





Acetaminophen (Acetaminophen 325 Mg Tab)  650 mg PO Q4H PRN


   PRN Reason: Pain (Mild 1-3)/fever


Albuterol (Albuterol 6.7 Gm Inhaler)  0 gm INH Q2H PRN


   PRN Reason: SOB/Wheezing


Albuterol/Ipratropium (Albuterol/Ipratropium 3.0-0.5 Mg/3 Ml Neb Soln)  3 ml NEB

QIDRT PRN


   PRN Reason: Shortness Of Breath/wheezing


Cholecalciferol (Cholecalciferol (Vitamin D3) 25 Mcg Tab)  50 mcg PO DAILY [facility]


Ferrous Sulfate (Ferrous Sulfate 324 Mg Tab.Ec)  324 mg PO DAILY CHE


Heparin Sodium (Porcine) (Heparin Sodium 5,000 Units/Ml Vial)  5,000 units 

SUBCUT Q8H [facility]


   Last Admin: 12/17/21 00:15 Dose:  Not Given


   Documented by: 


Cefepime HCl 2 gm/ Sodium (Chloride)  50 mls @ 100 mls/hr IV Q12H [facility]


   Last Admin: 12/17/21 00:23 Dose:  100 mls/hr


   Documented by: 


Vancomycin HCl 1 gm/Vancomycin HCl 250 mg/ Sodium Chloride  250 mls @ 166.667 

mls/hr IV Q24H [facility]


Lactated Ringer's (Ringers, Lactated)  1,000 mls @ 50 mls/hr IV ASDIRECTED [facility]


Ondansetron HCl (Ondansetron 4 Mg/2 Ml Sdv)  4 mg IV Q6H PRN


   PRN Reason: Nausea/Vomiting


Oxycodone HCl (Oxycodone 5 Mg Tab)  5 mg PO Q4H PRN


   PRN Reason: Pain (moderate 4-6)


Saccharomyces Boulardii (Saccharomyces Boulardii (Probiotic) 250 Mg Cap)  250 mg

PO DAILY [facility]


Senna/Docusate Sodium (Docusate Sodium/Sennosides 50-8.6 Mg Tab)  1 tab PO BID 

PRN


   PRN Reason: Constipation


Senna/Docusate Sodium (Docusate Sodium/Sennosides 50-8.6 Mg Tab)  2 tab PO QPM 

[facility]


   Last Admin: 12/16/21 17:37 Dose:  2 tab


   Documented by: 


Sodium Chloride (Sodium Chloride 0.9% 10 Ml Syringe)  10 ml FLUSH ASDIRECTED PRN


   PRN Reason: Keep Vein Open


   Last Admin: 12/16/21 09:16 Dose:  10 ml


   Documented by: 


Tamsulosin HCl (Tamsulosin 0.4 Mg Cap.Er)  0.4 mg PO BEDTIME [facility]


   Last Admin: 12/16/21 21:33 Dose:  0.4 mg


   Documented by: 


Vancomycin HCl (Pharmacy To Dose - Vancomycin)  1 dose .XX ASDIRECTED PRN


   PRN Reason: RX TO DOSE VANCO





Discontinued Medications





Acetaminophen (Acetaminophen 325 Mg Tab)  650 mg PO Q4H PRN


   PRN Reason: Pain (Mild 1-3)/fever


Albuterol (Albuterol 6.7 Gm Inhaler)  0 gm INH Q2H PRN


   PRN Reason: SOB/Wheezing


Albuterol/Ipratropium (Albuterol/Ipratropium 3.0-0.5 Mg/3 Ml Neb Soln)  3 ml NEB

Q6HRRT PRN


   PRN Reason: wheezing/SOB/cough


Cholecalciferol (Cholecalciferol (Vitamin D3) 25 Mcg Tab)  50 mcg PO DAILY [facility]


Ferrous Sulfate (Ferrous Sulfate 324 Mg Tab.Ec)  324 mg PO DAILY [facility]


Heparin Sodium (Porcine) (Heparin Sodium 5,000 Units/Ml Vial)  5,000 units 

SUBCUT Q8H [facility]


Sodium Chloride (Normal Saline)  1,000 mls @ 126 mls/hr IV ASDIRECTED [facility]


   Last Admin: 12/16/21 09:16 Dose:  126 mls/hr


   Documented by: 


Sodium Chloride (Normal Saline)  1,000 mls @ 250 mls/hr IV ASDIRECTED [facility]


   Last Admin: 12/16/21 10:14 Dose:  250 mls/hr


   Documented by: 


Cefepime HCl 2 gm/ Sodium (Chloride)  50 mls @ 100 mls/hr IV ONETIME ONE


   Stop: 12/16/21 12:51


   Last Admin: 12/16/21 12:36 Dose:  100 mls/hr


   Documented by: 


Vancomycin HCl 1 gm/Vancomycin HCl 250 mg/ Sodium Chloride  250 mls @ 166 mls/hr

IV ONETIME ONE


   Stop: 12/16/21 14:45


   Last Admin: 12/16/21 13:26 Dose:  166 mls/hr


   Documented by: 


Lactated Ringer's (Ringers, Lactated)  1,000 mls @ 250 mls/hr IV ASDIRECTED [facility]


   Last Infusion: 12/16/21 16:56 Dose:  50 mls/hr


   Documented by: 


Cefepime HCl 2 gm/ Sodium (Chloride)  50 mls @ 100 mls/hr IV Q12H [facility]


Vancomycin HCl 1 gm/Vancomycin HCl 250 mg/ Sodium Chloride  250 mls @ 166.667 

mls/hr IV Q24H [facility]


Lidocaine HCl (Lidocaine 2% Jelly 10 Ml Urojet)  10 ml MUCMEM ONETIME ONE


   Stop: 12/16/21 11:45


   Last Admin: 12/16/21 12:42 Dose:  Not Given


   Documented by: 


Magnesium Hydroxide (Magnesium Hydroxide 400 Mg/5 Ml Susp 30 Ml Cup)  30 ml PO 

ONETIME ONE


   Stop: 12/17/21 06:01


   Last Admin: 12/17/21 05:16 Dose:  Not Given


   Documented by: 


Ondansetron HCl (Ondansetron 4 Mg/2 Ml Sdv)  4 mg IV Q4H PRN


   PRN Reason: Nausea/Vomiting


Saccharomyces Boulardii (Saccharomyces Boulardii (Probiotic) 250 Mg Cap)  250 mg

PO DAILY [facility]


Senna/Docusate Sodium (Docusate Sodium/Sennosides 50-8.6 Mg Tab)  1 tab PO BID 

PRN


   PRN Reason: Constipation


Senna/Docusate Sodium (Docusate Sodium/Sennosides 50-8.6 Mg Tab)  2 tab PO QPM 

[facility]


Tamsulosin HCl (Tamsulosin 0.4 Mg Cap.Er)  0.4 mg PO BEDTIME [facility]


Vancomycin HCl (Pharmacy To Dose - Vancomycin)  1 dose .XX ASDIRECTED ONE


   Stop: 12/16/21 12:46


   Last Admin: 12/16/21 13:22 Dose:  Not Given


   Documented by: 


Vancomycin HCl (Pharmacy To Dose - Vancomycin)  1 dose .XX ASDIRECTED PRN


   PRN Reason: RX TO DOSE VANCO











- Exam


Quality Assessment: DVT Prophylaxis.  No: Supplemental Oxygen, Urine Catheter


General: Alert, Oriented (mostly ), Cooperative, No Acute Distress


HEENT: Pupils Equal, Pupils Reactive, Mucous Membr. Moist/Pink


Neck: Supple, Trachea Midline


Lungs: Clear to Auscultation, Normal Respiratory Effort


Cardiovascular: Regular Rate, Regular Rhythm


GI/Abdominal Exam: Normal Bowel Sounds, Soft, Non-Tender, No Distention


 (Male) Exam: Deferred


Back Exam: Decreased Range of Motion, Other (Herpes zoster lesions on right 

upper back - crusted over ).  No: CVA Tenderness (L), CVA Tenderness (R)


Extremities: Normal Inspection, Normal Range of Motion, Non-Tender, No Pedal 

Edema, Normal Capillary Refill


Skin: Warm, Dry, Intact


Neurological: No New Focal Deficit


Psy/Mental Status: Alert, Normal Affect, Normal Mood





- Patient Data


Lab Results Last 24 hrs: 


                         Laboratory Results - last 24 hr











  12/16/21 12/16/21 12/16/21 Range/Units





  08:55 08:55 08:55 


 


WBC  16.47 H    (4.23-9.07)  K/mm3


 


RBC  4.32 L    (4.63-6.08)  M/mm3


 


Hgb  13.5 L D    (13.7-17.5)  gm/dl


 


Hct  42.8    (40.1-51.0)  %


 


MCV  99.1 H    (79.0-92.2)  fl


 


MCH  31.3    (25.7-32.2)  pg


 


MCHC  31.5 L    (32.2-35.5)  g/dl


 


RDW Std Deviation  56.6 H    (35.1-43.9)  fL


 


Plt Count  281  D    (163-337)  K/mm3


 


MPV  9.1 L    (9.4-12.3)  fl


 


Neut % (Auto)     (34.0-67.9)  %


 


Lymph % (Auto)     (21.8-53.1)  %


 


Mono % (Auto)     (5.3-12.2)  %


 


Eos % (Auto)     (0.8-7.0)  


 


Baso % (Auto)     (0.1-1.2)  %


 


Neut # (Auto)     (1.78-5.38)  K/mm3


 


Lymph # (Auto)     (1.32-3.57)  K/mm3


 


Mono # (Auto)     (0.30-0.82)  K/mm3


 


Eos # (Auto)     (0.04-0.54)  K/mm3


 


Baso # (Auto)     (0.01-0.08)  K/mm3


 


Neutrophils % (Manual)  84 H    (40-60)  %


 


Band Neutrophils %  10    (0-10)  %


 


Lymphocytes % (Manual)  3 L    (20-40)  %


 


Atypical Lymphs %  1    %


 


Monocytes % (Manual)  2    (2-10)  %


 


Eosinophils % (Manual)  0 L    (0.8-7.0)  %


 


Basophils % (Manual)  0 L    (0.2-1.2)  


 


Platelet Estimate  Adequate    


 


Plt Morphology Comment  Normal    


 


RBC Morph Comment  Normal    


 


PT   11.7   (9.7-12.0)  SECONDS


 


INR   1.06   


 


Sodium    139  (136-145)  mEq/L


 


Potassium    4.1  (3.5-5.1)  mEq/L


 


Chloride    103  ()  mEq/L


 


Carbon Dioxide    21  (21-32)  mEq/L


 


Anion Gap    19.1 H  (5-15)  


 


BUN    22 H  (7-18)  mg/dL


 


Creatinine    2.0 H  (0.7-1.3)  mg/dL


 


Est Cr Clr Drug Dosing    TNP  


 


Estimated GFR (MDRD)    32  (>60)  mL/min


 


BUN/Creatinine Ratio    11.0 L  (14-18)  


 


Glucose    127 H  (70-99)  mg/dL


 


Lactic Acid     (0.4-2.0)  mmol/L


 


Calcium    9.1  (8.5-10.1)  mg/dL


 


Total Bilirubin    0.6  (0.2-1.0)  mg/dL


 


AST    13 L  (15-37)  U/L


 


ALT    20  (16-63)  U/L


 


Alkaline Phosphatase    66  ()  U/L


 


C-Reactive Protein    2.6 H*  (<1.0)  mg/dL


 


Total Protein    7.5  (6.4-8.2)  g/dl


 


Albumin    3.1 L  (3.4-5.0)  g/dl


 


Globulin    4.4  gm/dL


 


Albumin/Globulin Ratio    0.7 L  (1-2)  


 


Urine Color     (Yellow)  


 


Urine Appearance     (Clear)  


 


Urine pH     (5.0-8.0)  


 


Ur Specific Gravity     (1.005-1.030)  


 


Urine Protein     (Negative)  


 


Urine Glucose (UA)     (Negative)  


 


Urine Ketones     (Negative)  


 


Urine Occult Blood     (Negative)  


 


Urine Nitrite     (Negative)  


 


Urine Bilirubin     (Negative)  


 


Urine Urobilinogen     (0.2-1.0)  


 


Ur Leukocyte Esterase     (Negative)  


 


Urine RBC     (0-5)  /hpf


 


Urine WBC     (0-5)  /hpf


 


Ur Epithelial Cells     (0-5)  /hpf


 


Urine Bacteria     (FEW)  /hpf


 


Urine Mucus     (FEW)  /hpf


 


SARS-CoV-2 RNA ([facility])     (NEGATIVE)  


 


MRSA (PCR)     














  12/16/21 12/16/21 12/16/21 Range/Units





  08:55 09:16 09:45 


 


WBC     (4.23-9.07)  K/mm3


 


RBC     (4.63-6.08)  M/mm3


 


Hgb     (13.7-17.5)  gm/dl


 


Hct     (40.1-51.0)  %


 


MCV     (79.0-92.2)  fl


 


MCH     (25.7-32.2)  pg


 


MCHC     (32.2-35.5)  g/dl


 


RDW Std Deviation     (35.1-43.9)  fL


 


Plt Count     (163-337)  K/mm3


 


MPV     (9.4-12.3)  fl


 


Neut % (Auto)     (34.0-67.9)  %


 


Lymph % (Auto)     (21.8-53.1)  %


 


Mono % (Auto)     (5.3-12.2)  %


 


Eos % (Auto)     (0.8-7.0)  


 


Baso % (Auto)     (0.1-1.2)  %


 


Neut # (Auto)     (1.78-5.38)  K/mm3


 


Lymph # (Auto)     (1.32-3.57)  K/mm3


 


Mono # (Auto)     (0.30-0.82)  K/mm3


 


Eos # (Auto)     (0.04-0.54)  K/mm3


 


Baso # (Auto)     (0.01-0.08)  K/mm3


 


Neutrophils % (Manual)     (40-60)  %


 


Band Neutrophils %     (0-10)  %


 


Lymphocytes % (Manual)     (20-40)  %


 


Atypical Lymphs %     %


 


Monocytes % (Manual)     (2-10)  %


 


Eosinophils % (Manual)     (0.8-7.0)  %


 


Basophils % (Manual)     (0.2-1.2)  


 


Platelet Estimate     


 


Plt Morphology Comment     


 


RBC Morph Comment     


 


PT     (9.7-12.0)  SECONDS


 


INR     


 


Sodium     (136-145)  mEq/L


 


Potassium     (3.5-5.1)  mEq/L


 


Chloride     ()  mEq/L


 


Carbon Dioxide     (21-32)  mEq/L


 


Anion Gap     (5-15)  


 


BUN     (7-18)  mg/dL


 


Creatinine     (0.7-1.3)  mg/dL


 


Est Cr Clr Drug Dosing     


 


Estimated GFR (MDRD)     (>60)  mL/min


 


BUN/Creatinine Ratio     (14-18)  


 


Glucose     (70-99)  mg/dL


 


Lactic Acid  4.1 H*    (0.4-2.0)  mmol/L


 


Calcium     (8.5-10.1)  mg/dL


 


Total Bilirubin     (0.2-1.0)  mg/dL


 


AST     (15-37)  U/L


 


ALT     (16-63)  U/L


 


Alkaline Phosphatase     ()  U/L


 


C-Reactive Protein     (<1.0)  mg/dL


 


Total Protein     (6.4-8.2)  g/dl


 


Albumin     (3.4-5.0)  g/dl


 


Globulin     gm/dL


 


Albumin/Globulin Ratio     (1-2)  


 


Urine Color    Yellow  (Yellow)  


 


Urine Appearance    Turbid H  (Clear)  


 


Urine pH    7.0  (5.0-8.0)  


 


Ur Specific Gravity    1.015  (1.005-1.030)  


 


Urine Protein    2+ H  (Negative)  


 


Urine Glucose (UA)    Negative  (Negative)  


 


Urine Ketones    Negative  (Negative)  


 


Urine Occult Blood    2+ H  (Negative)  


 


Urine Nitrite    Negative  (Negative)  


 


Urine Bilirubin    1+ H  (Negative)  


 


Urine Urobilinogen    0.2  (0.2-1.0)  


 


Ur Leukocyte Esterase    3+ H  (Negative)  


 


Urine RBC    50-75 H  (0-5)  /hpf


 


Urine WBC    Too numerous to cnt H  (0-5)  /hpf


 


Ur Epithelial Cells    0-5  (0-5)  /hpf


 


Urine Bacteria    Many H  (FEW)  /hpf


 


Urine Mucus    Moderate H  (FEW)  /hpf


 


SARS-CoV-2 RNA (MADDIE)   Negative   (NEGATIVE)  


 


MRSA (PCR)     














  12/16/21 12/16/21 12/16/21 Range/Units





  11:58 15:15 16:24 


 


WBC     (4.23-9.07)  K/mm3


 


RBC     (4.63-6.08)  M/mm3


 


Hgb     (13.7-17.5)  gm/dl


 


Hct     (40.1-51.0)  %


 


MCV     (79.0-92.2)  fl


 


MCH     (25.7-32.2)  pg


 


MCHC     (32.2-35.5)  g/dl


 


RDW Std Deviation     (35.1-43.9)  fL


 


Plt Count     (163-337)  K/mm3


 


MPV     (9.4-12.3)  fl


 


Neut % (Auto)     (34.0-67.9)  %


 


Lymph % (Auto)     (21.8-53.1)  %


 


Mono % (Auto)     (5.3-12.2)  %


 


Eos % (Auto)     (0.8-7.0)  


 


Baso % (Auto)     (0.1-1.2)  %


 


Neut # (Auto)     (1.78-5.38)  K/mm3


 


Lymph # (Auto)     (1.32-3.57)  K/mm3


 


Mono # (Auto)     (0.30-0.82)  K/mm3


 


Eos # (Auto)     (0.04-0.54)  K/mm3


 


Baso # (Auto)     (0.01-0.08)  K/mm3


 


Neutrophils % (Manual)     (40-60)  %


 


Band Neutrophils %     (0-10)  %


 


Lymphocytes % (Manual)     (20-40)  %


 


Atypical Lymphs %     %


 


Monocytes % (Manual)     (2-10)  %


 


Eosinophils % (Manual)     (0.8-7.0)  %


 


Basophils % (Manual)     (0.2-1.2)  


 


Platelet Estimate     


 


Plt Morphology Comment     


 


RBC Morph Comment     


 


PT     (9.7-12.0)  SECONDS


 


INR     


 


Sodium     (136-145)  mEq/L


 


Potassium     (3.5-5.1)  mEq/L


 


Chloride     ()  mEq/L


 


Carbon Dioxide     (21-32)  mEq/L


 


Anion Gap     (5-15)  


 


BUN     (7-18)  mg/dL


 


Creatinine     (0.7-1.3)  mg/dL


 


Est Cr Clr Drug Dosing     


 


Estimated GFR (MDRD)     (>60)  mL/min


 


BUN/Creatinine Ratio     (14-18)  


 


Glucose     (70-99)  mg/dL


 


Lactic Acid  3.5 H*  1.9   (0.4-2.0)  mmol/L


 


Calcium     (8.5-10.1)  mg/dL


 


Total Bilirubin     (0.2-1.0)  mg/dL


 


AST     (15-37)  U/L


 


ALT     (16-63)  U/L


 


Alkaline Phosphatase     ()  U/L


 


C-Reactive Protein     (<1.0)  mg/dL


 


Total Protein     (6.4-8.2)  g/dl


 


Albumin     (3.4-5.0)  g/dl


 


Globulin     gm/dL


 


Albumin/Globulin Ratio     (1-2)  


 


Urine Color     (Yellow)  


 


Urine Appearance     (Clear)  


 


Urine pH     (5.0-8.0)  


 


Ur Specific Gravity     (1.005-1.030)  


 


Urine Protein     (Negative)  


 


Urine Glucose (UA)     (Negative)  


 


Urine Ketones     (Negative)  


 


Urine Occult Blood     (Negative)  


 


Urine Nitrite     (Negative)  


 


Urine Bilirubin     (Negative)  


 


Urine Urobilinogen     (0.2-1.0)  


 


Ur Leukocyte Esterase     (Negative)  


 


Urine RBC     (0-5)  /hpf


 


Urine WBC     (0-5)  /hpf


 


Ur Epithelial Cells     (0-5)  /hpf


 


Urine Bacteria     (FEW)  /hpf


 


Urine Mucus     (FEW)  /hpf


 


SARS-CoV-2 RNA (MADDIE)     (NEGATIVE)  


 


MRSA (PCR)    Negative  














  12/17/21 12/17/21 Range/Units





  06:10 06:10 


 


WBC  6.93   (4.23-9.07)  K/mm3


 


RBC  3.88 L   (4.63-6.08)  M/mm3


 


Hgb  12.0 L D   (13.7-17.5)  gm/dl


 


Hct  38.6 L   (40.1-51.0)  %


 


MCV  99.5 H   (79.0-92.2)  fl


 


MCH  30.9   (25.7-32.2)  pg


 


MCHC  31.1 L   (32.2-35.5)  g/dl


 


RDW Std Deviation  56.9 H   (35.1-43.9)  fL


 


Plt Count  214   (163-337)  K/mm3


 


MPV  9.7   (9.4-12.3)  fl


 


Neut % (Auto)  67.8   (34.0-67.9)  %


 


Lymph % (Auto)  16.7 L   (21.8-53.1)  %


 


Mono % (Auto)  13.0 H   (5.3-12.2)  %


 


Eos % (Auto)  1.7   (0.8-7.0)  


 


Baso % (Auto)  0.7   (0.1-1.2)  %


 


Neut # (Auto)  4.69   (1.78-5.38)  K/mm3


 


Lymph # (Auto)  1.16 L   (1.32-3.57)  K/mm3


 


Mono # (Auto)  0.90 H   (0.30-0.82)  K/mm3


 


Eos # (Auto)  0.12   (0.04-0.54)  K/mm3


 


Baso # (Auto)  0.05   (0.01-0.08)  K/mm3


 


Neutrophils % (Manual)    (40-60)  %


 


Band Neutrophils %    (0-10)  %


 


Lymphocytes % (Manual)    (20-40)  %


 


Atypical Lymphs %    %


 


Monocytes % (Manual)    (2-10)  %


 


Eosinophils % (Manual)    (0.8-7.0)  %


 


Basophils % (Manual)    (0.2-1.2)  


 


Platelet Estimate    


 


Plt Morphology Comment    


 


RBC Morph Comment    


 


PT    (9.7-12.0)  SECONDS


 


INR    


 


Sodium   137  (136-145)  mEq/L


 


Potassium   4.2  (3.5-5.1)  mEq/L


 


Chloride   105  ()  mEq/L


 


Carbon Dioxide   21  (21-32)  mEq/L


 


Anion Gap   15.2 H  (5-15)  


 


BUN   22 H  (7-18)  mg/dL


 


Creatinine   1.6 H  (0.7-1.3)  mg/dL


 


Est Cr Clr Drug Dosing   30.23  


 


Estimated GFR (MDRD)   41  (>60)  mL/min


 


BUN/Creatinine Ratio   13.8 L  (14-18)  


 


Glucose   104 H  (70-99)  mg/dL


 


Lactic Acid    (0.4-2.0)  mmol/L


 


Calcium   8.9  (8.5-10.1)  mg/dL


 


Total Bilirubin    (0.2-1.0)  mg/dL


 


AST    (15-37)  U/L


 


ALT    (16-63)  U/L


 


Alkaline Phosphatase    ()  U/L


 


C-Reactive Protein   8.8 H*  (<1.0)  mg/dL


 


Total Protein    (6.4-8.2)  g/dl


 


Albumin    (3.4-5.0)  g/dl


 


Globulin    gm/dL


 


Albumin/Globulin Ratio    (1-2)  


 


Urine Color    (Yellow)  


 


Urine Appearance    (Clear)  


 


Urine pH    (5.0-8.0)  


 


Ur Specific Gravity    (1.005-1.030)  


 


Urine Protein    (Negative)  


 


Urine Glucose (UA)    (Negative)  


 


Urine Ketones    (Negative)  


 


Urine Occult Blood    (Negative)  


 


Urine Nitrite    (Negative)  


 


Urine Bilirubin    (Negative)  


 


Urine Urobilinogen    (0.2-1.0)  


 


Ur Leukocyte Esterase    (Negative)  


 


Urine RBC    (0-5)  /hpf


 


Urine WBC    (0-5)  /hpf


 


Ur Epithelial Cells    (0-5)  /hpf


 


Urine Bacteria    (FEW)  /hpf


 


Urine Mucus    (FEW)  /hpf


 


SARS-CoV-2 RNA (MADDIE)    (NEGATIVE)  


 


MRSA (PCR)    











Result Diagrams: 


                                 12/17/21 06:10





                                 12/17/21 06:10





Sepsis Event Note





- Evaluation


Sepsis Screening Result: No Definite Risk





- Focused Exam


Vital Signs: 


                                   Vital Signs











  Temp Pulse Resp BP Pulse Ox


 


 12/17/21 03:37  97.9 F  66  15  93/59 L  94 L


 


 12/17/21 00:18  97.7 F  67  18  128/72  96


 


 12/16/21 21:35  97.7 F  69  18  132/107 H  96














- Problem List & Annotations


(1) Dementia


SNOMED Code(s): 65586977


   Code(s): F03.90 - UNSPECIFIED DEMENTIA WITHOUT BEHAVIORAL DISTURBANCE   

Status: Chronic   Priority: Low   Current Visit: No   


Qualifiers: 


   Dementia type: unspecified type   Dementia behavioral disturbance: without 

behavioral disturbance   Qualified Code(s): F03.90 - Unspecified dementia 

without behavioral disturbance   





(2) Generalized weakness


SNOMED Code(s): 35144907


   Code(s): R53.1 - WEAKNESS   Status: Acute   Priority: High   Current Visit: 

No   





(3) Ptosis


SNOMED Code(s): 27031596


   Code(s): H02.409 - UNSPECIFIED PTOSIS OF UNSPECIFIED EYELID   Status: Chronic

  Priority: Low   Current Visit: No   


Qualifiers: 


   Laterality: left   Qualified Code(s): H02.402 - Unspecified ptosis of left 

eyelid   





(4) Shingles


SNOMED Code(s): 7423138


   Code(s): B02.9 - ZOSTER WITHOUT COMPLICATIONS   Status: Chronic   Priority: 

Low   Current Visit: No   


Qualifiers: 


   Herpes zoster complications: without complications   Qualified Code(s): B02.9

- Zoster without complications   





(5) UTI, Urinary tract infectious disease


SNOMED Code(s): 09016213


   Code(s): N39.0 - URINARY TRACT INFECTION, SITE NOT SPECIFIED   Status: Acute 

 Priority: High   Current Visit: No   





(6) AAA (abdominal aortic aneurysm)


SNOMED Code(s): 114969015


   Code(s): I71.4 - ABDOMINAL AORTIC ANEURYSM, WITHOUT RUPTURE   Status: Chronic

  Priority: Medium   Current Visit: No   


Qualifiers: 


   Presence of rupture: without rupture   Qualified Code(s): I71.4 - Abdominal 

aortic aneurysm, without rupture   





(7) COPD (chronic obstructive pulmonary disease)


SNOMED Code(s): 57034690


   Code(s): J44.9 - CHRONIC OBSTRUCTIVE PULMONARY DISEASE, UNSPECIFIED   Status:

Chronic   Priority: Medium   Current Visit: No   


Qualifiers: 


   COPD type: unspecified COPD   Qualified Code(s): J44.9 - Chronic obstructive 

pulmonary disease, unspecified   





(8) Cerebral arterial aneurysm


SNOMED Code(s): 817043579


   Code(s): I67.1 - CEREBRAL ANEURYSM, NONRUPTURED   Status: Chronic   Priority:

Low   Current Visit: No   





(9) Chronic back pain


SNOMED Code(s): 522427784


   Code(s): M54.9 - DORSALGIA, UNSPECIFIED; G89.29 - OTHER CHRONIC PAIN   

Status: Chronic   Priority: Low   Current Visit: No   


Qualifiers: 


   Back pain location: back pain in unspecified location   Back pain laterality:

unspecified   Qualified Code(s): M54.9 - Dorsalgia, unspecified; G89.29 - Other 

chronic pain   





(10) Chronic renal insufficiency, stage III (moderate)


SNOMED Code(s): 947627024


   Code(s): N18.30 - CHRONIC KIDNEY DISEASE, STAGE 3 UNSPECIFIED   Status: 

Chronic   Priority: Medium   Current Visit: No   


Qualifiers: 


   Chronic kidney disease stage 3 subtype: stage 3a (GFR 45-59)   Qualified 

Code(s): N18.31 - Chronic kidney disease, stage 3a   





(11) Prostate disorder


SNOMED Code(s): 61777860


   Code(s): N42.9 - DISORDER OF PROSTATE, UNSPECIFIED   Status: Chronic   

Priority: Medium   Current Visit: No   





(12) Recurrent UTI


SNOMED Code(s): 295560774


   Code(s): N39.0 - URINARY TRACT INFECTION, SITE NOT SPECIFIED   Status: 

Chronic   Priority: High   Current Visit: No   





(13) Elevated lactic acid level


SNOMED Code(s): 6799552


   Code(s): R79.89 - OTHER SPECIFIED ABNORMAL FINDINGS OF BLOOD CHEMISTRY   

Status: Acute   Priority: High   Current Visit: No   





(14) Sepsis


SNOMED Code(s): 07244017


   Code(s): A41.9 - SEPSIS, UNSPECIFIED ORGANISM   Status: Acute   Priority: 

High   Current Visit: No   


Qualifiers: 


   Sepsis type: sepsis due to unspecified organism   Sepsis acute organ 

dysfunction status: with acute organ dysfunction   Severe sepsis acute organ 

dysfunction type: unspecified   Severe sepsis shock status: without septic shock

  Qualified Code(s): A41.9 - Sepsis, unspecified organism; R65.20 - Severe 

sepsis without septic shock   





- Problem List Review


Problem List Initiated/Reviewed/Updated: Yes





- My Orders


Last 24 Hours: 


My Active Orders





12/16/21 09:45


CULTURE URINE [MREF] Stat 





12/16/21 14:24


Height and Weight [RC] 06 


Consult to Case Management/ [CONS] Routine 


Acetaminophen [TylenoL]   650 mg PO Q4H PRN 


Docusate Sodium/Sennosides [Senna Plus]   1 tab PO BID PRN 


Ondansetron [Zofran]   4 mg IV Q6H PRN 





12/16/21 14:25


Intake and Output [RC] 04,16 


Oxygen Therapy [RC] ASDIRECTED 


Pulse Oximetry [RC] PRN 


Up With Assistance [RC] BID 


Vital Signs [RC] Q4HR 





12/16/21 14:26


OT Evaluation and Treatment [CONS] Routine 


PT Evaluation and Treatment [CONS] Routine 


Precautions [COMM] Routine 





12/16/21 14:30


Pharmacy to Dose - Vancomycin   1 dose .XX ASDIRECTED PRN 





12/16/21 14:51


Let Patient Sleep Protocol [RC] BEDTIME 





12/16/21 14:54


Bladder Scan [RC] DAILY 


Communication Order [RC] DAILY 





12/16/21 15:05


RT Aerosol Therapy [RC] ASDIRECTED 





12/16/21 15:06


RT Post Treatment Assessment [RC] Click to Edit 


RT Pre-Treatment Assessment [RC] Click to Edit 





12/16/21 15:15


PROCALCITONIN [REF] Urgent 





12/16/21 15:19


Albuterol [Proventil HFA]   See Dose Instructions  INH Q2H PRN 


Albuterol/Ipratropium [DuoNeb 3.0-0.5 MG/3 ML]   3 ml NEB QIDRT PRN 


oxyCODONE   5 mg PO Q4H PRN 





12/16/21 15:30


Code Status [Resuscitation Status] Routine 





12/16/21 16:00


Heparin Sodium   5,000 units SUBCUT Q8H 





12/16/21 Dinner


Regular Diet [DIET] 





12/16/21 18:00


Docusate Sodium/Sennosides [Senna Plus]   2 tab PO QPM 





12/16/21 21:00


Tamsulosin [Flomax]   0.4 mg PO BEDTIME 





12/17/21 01:00


Cefepime [Maxipime] 2 gm   Sodium Chloride 0.9% [Normal Saline] 50 ml IV Q12H 





12/17/21 09:00


Cholecalciferol (Vitamin D3) [Vitamin D3]   50 mcg PO DAILY 


Ferrous Sulfate   324 mg PO DAILY 


Saccharomyces Boulardii [Florastor]   250 mg PO DAILY 





12/17/21 13:30


Vancomycin 1 gm Vancomycin 250 mg   Sodium Chloride 0.9% [Normal Saline] 250 ml 

IV Q24H 





12/18/21 05:11


BASIC METABOLIC PANEL,BMP [CHEM] AM 


C-REACTIVE PROTEIN [CHEM] AM 


CBC WITH AUTO DIFF [HEME] AM 





12/18/21 12:30


VANCOMYCIN TROUGH [CHEM] Timed 





12/19/21 05:11


BASIC METABOLIC PANEL,BMP [CHEM] AM 


C-REACTIVE PROTEIN [CHEM] AM 


CBC WITH AUTO DIFF [HEME] AM 





12/20/21 05:11


BASIC METABOLIC PANEL,BMP [CHEM] AM 


C-REACTIVE PROTEIN [CHEM] AM 


CBC WITH AUTO DIFF [HEME] AM 














- Assessment


Assessment:: 


12/17/2021


This is an 86-year-old male admitted to the floor for treatment of UTI and 

sepsis.  Yesterday his lactic acid did come down to 1.9 on repeat after IV 

fluids and his lactated Ringer's infusion was decreased to 50 cc/h.  Since then 

he has been doing well.  He remains confused at times.  IV fluids have been 

discontinued.  He continues on vancomycin and cefepime.  Urine and blood 

cultures are still pending.  Procalcitonin is still pending.  WBC today has 

returned to normal at 6.93.  Hemoglobin is 12.0 with the decrease likely due to 

dilution.  Platelets are 214,000.  Neutrophils are within normal limits at 

67.8%.  Sodium is 137.  Potassium 4.2.  Chloride 105.  Carbon dioxide 28.  Anion

 gap is 15.2.  BUN is 22.  Creatinine 1.6.  GFR is 41.  Glucose is 104.  Calcium

 is 8.9.  CRP is up to 8.8.  MRSA screen yesterday was negative.  Dietitian has 

been in to see the patient and will start a supplement.  Patient has been 

refusing therapies and we discussed importance of this.  He agrees to work with 

him this afternoon.  He has been refusing his heparin.  He has a VTE score of 4 

and we will switch him to ESEQUIEL hose for now.  We will continue current treatment 

plan pending blood and urine cultures to direct antibiotic choice.  This along 

with outpatient dose with therapies will direct discharge plan.








- Plan


Plan:: 


UTI, Urinary tract infectious disease


Prostate disorder


Recurrent UTI


Elevated lactic acid level, resolved 


Sepsis, resolved 


Generalized weakness


* Started on cefepime and vancomycin in ED given fairly recent hospitalization 

  for UTIcontinue with pharmacy to dose vancomycin


* Blood cultures pending


* Urine cultures pending


* Check procalcitonin - pending 


* Continue home Flomax


* As needed bladder scans


* Facility urinary retention protocol if needed


* IV fluids as indicated


* PT/OT


* Case management/social work consultation


* Will need urology follow-up after discharge


* Sepsis criteria: Leukocytosis, tachycardia and lactic acidosis-meets criteria.

    No hypotension noted





Dementia


* No acute concerns


* Untreated


* Let me sleep protocol





Chronic renal insufficiency, stage III (moderate), improved 


* IV fluids as indicated


* Avoid nephrotoxic medications if able


* Baseline appears to be in the mid to upper ones for creatinine


* Monitor labs





Cerebral arterial aneurysm


Ptosis


* No acute concerns





Shingles


* No acute concerns


* Lesions have crusted over





AAA (abdominal aortic aneurysm)


* No acute concerns





COPD (chronic obstructive pulmonary disease)


* No acute concerns


* As needed DuoNebs


* PRN albuterol MDI





Chronic back pain


* PT/OT


* Pain medications as ordered





Code status: Full code


PCP: [facility]


DVT prophylaxis: Heparin Subcutaneous


Social: Patient resides at [facility]


Disposition: Admitted to hospital floor inpatient with telemetry for management 

of UTI and sepsis.  Likely length of stay 3 to 4 days pending culture results 

and progress.

## 2021-12-18 NOTE — PCM.PN
- General Info


Date of Service: 12/18/21


Admission Dx/Problem (Free Text): 


                           Admission Diagnosis/Problem





Admission Diagnosis/Problem      Urosepsis








Subjective Update: 





Initially the patient did not want to have lab draws or a new IV. I did discuss 

with him the need to do these to ensure adequate resolution of his UTI. Patient 

agreed. He denies any pain.





- Review of Systems


General: Reports: No Symptoms


HEENT: Reports: No Symptoms


Pulmonary: Reports: No Symptoms


Cardiovascular: Reports: No Symptoms


Gastrointestinal: Reports: No Symptoms


Musculoskeletal: Reports: No Symptoms


Neurological: Reports: No Symptoms


Psychiatric: Reports: No Symptoms





- Patient Data


Vitals - Most Recent: 


                                Last Vital Signs











Temp  97.9 F   12/18/21 07:19


 


Pulse  67   12/18/21 07:19


 


Resp  18   12/18/21 07:19


 


BP  107/66   12/18/21 07:19


 


Pulse Ox  96   12/18/21 07:19











Weight - Most Recent: 141 lb 14.4 oz


I&O - Last 24 Hours: 


                                 Intake & Output











 12/17/21 12/18/21 12/18/21





 22:59 06:59 14:59


 


Intake Total 1000  


 


Output Total 350  


 


Balance 650  











Lab Results Last 24 Hours: 


                         Laboratory Results - last 24 hr











  12/16/21 Range/Units





  15:15 


 


Procalcitonin  1.79 H  ng/mL











Oren Results Last 24 Hours: 


                                  Microbiology











 12/16/21 09:45 Urine Culture - Final





 Urine 


 


 12/16/21 09:04 Blood Culture - Preliminary





 Blood - Venous - Lab Draw 


 


 12/16/21 08:55 Blood Culture - Preliminary





 Blood - Venous 











Med Orders - Current: 


                               Current Medications





Acetaminophen (Acetaminophen 325 Mg Tab)  650 mg PO Q4H PRN


   PRN Reason: Pain (Mild 1-3)/fever


Albuterol (Albuterol 6.7 Gm Inhaler)  0 gm INH Q2H PRN


   PRN Reason: SOB/Wheezing


Albuterol/Ipratropium (Albuterol/Ipratropium 3.0-0.5 Mg/3 Ml Neb Soln)  3 ml NEB

QIDRT PRN


   PRN Reason: Shortness Of Breath/wheezing


Cholecalciferol (Cholecalciferol (Vitamin D3) 25 Mcg Tab)  50 mcg PO DAILY [facility]


   Last Admin: 12/18/21 08:08 Dose:  50 mcg


   Documented by: 


Ferrous Sulfate (Ferrous Sulfate 324 Mg Tab.Ec)  324 mg PO DAILY [facility]


   Last Admin: 12/18/21 08:09 Dose:  324 mg


   Documented by: 


Heparin Sodium (Porcine) (Heparin Sodium 5,000 Units/Ml Vial)  5,000 units 

SUBCUT Q8H [facility]


   Last Admin: 12/18/21 08:09 Dose:  Not Given


   Documented by: 


Cefepime HCl 2 gm/ Sodium (Chloride)  50 mls @ 100 mls/hr IV Q12H [facility]


   Last Admin: 12/18/21 02:13 Dose:  100 mls/hr


   Documented by: 


Vancomycin HCl 1 gm/Vancomycin HCl 250 mg/ Sodium Chloride  250 mls @ 166.667 

mls/hr IV Q24H [facility]


   Last Admin: 12/17/21 13:26 Dose:  166.667 mls/hr


   Documented by: 


Ondansetron HCl (Ondansetron 4 Mg/2 Ml Sdv)  4 mg IV Q6H PRN


   PRN Reason: Nausea/Vomiting


Oxycodone HCl (Oxycodone 5 Mg Tab)  5 mg PO Q4H PRN


   PRN Reason: Pain (moderate 4-6)


Saccharomyces Boulardii (Saccharomyces Boulardii (Probiotic) 250 Mg Cap)  250 mg

PO DAILY [facility]


   Last Admin: 12/18/21 08:09 Dose:  250 mg


   Documented by: 


Senna/Docusate Sodium (Docusate Sodium/Sennosides 50-8.6 Mg Tab)  1 tab PO BID 

PRN


   PRN Reason: Constipation


Senna/Docusate Sodium (Docusate Sodium/Sennosides 50-8.6 Mg Tab)  2 tab PO QPM 

[facility]


   Last Admin: 12/17/21 17:32 Dose:  Not Given


   Documented by: 


Sodium Chloride (Sodium Chloride 0.9% 10 Ml Syringe)  10 ml FLUSH ASDIRECTED PRN


   PRN Reason: Keep Vein Open


   Last Admin: 12/16/21 09:16 Dose:  10 ml


   Documented by: 


Tamsulosin HCl (Tamsulosin 0.4 Mg Cap.Er)  0.4 mg PO BEDTIME [facility]


   Last Admin: 12/17/21 21:45 Dose:  Not Given


   Documented by: 


Vancomycin HCl (Pharmacy To Dose - Vancomycin)  1 dose .XX ASDIRECTED PRN


   PRN Reason: RX TO DOSE VANCO





Discontinued Medications





Acetaminophen (Acetaminophen 325 Mg Tab)  650 mg PO Q4H PRN


   PRN Reason: Pain (Mild 1-3)/fever


Albuterol (Albuterol 6.7 Gm Inhaler)  0 gm INH Q2H PRN


   PRN Reason: SOB/Wheezing


Albuterol/Ipratropium (Albuterol/Ipratropium 3.0-0.5 Mg/3 Ml Neb Soln)  3 ml NEB

Q6HRRT PRN


   PRN Reason: wheezing/SOB/cough


Cholecalciferol (Cholecalciferol (Vitamin D3) 25 Mcg Tab)  50 mcg PO DAILY [facility]


Ferrous Sulfate (Ferrous Sulfate 324 Mg Tab.Ec)  324 mg PO DAILY [facility]


Heparin Sodium (Porcine) (Heparin Sodium 5,000 Units/Ml Vial)  5,000 units 

SUBCUT Q8H [facility]


Sodium Chloride (Normal Saline)  1,000 mls @ 126 mls/hr IV ASDIRECTED [facility]


   Last Admin: 12/16/21 09:16 Dose:  126 mls/hr


   Documented by: 


Sodium Chloride (Normal Saline)  1,000 mls @ 250 mls/hr IV ASDIRECTED [facility]


   Last Admin: 12/16/21 10:14 Dose:  250 mls/hr


   Documented by: 


Cefepime HCl 2 gm/ Sodium (Chloride)  50 mls @ 100 mls/hr IV ONETIME ONE


   Stop: 12/16/21 12:51


   Last Admin: 12/16/21 12:36 Dose:  100 mls/hr


   Documented by: 


Vancomycin HCl 1 gm/Vancomycin HCl 250 mg/ Sodium Chloride  250 mls @ 166 mls/hr

IV ONETIME ONE


   Stop: 12/16/21 14:45


   Last Admin: 12/16/21 13:26 Dose:  166 mls/hr


   Documented by: 


Lactated Ringer's (Ringers, Lactated)  1,000 mls @ 250 mls/hr IV ASDIRECTED [facility]


   Last Infusion: 12/16/21 16:56 Dose:  50 mls/hr


   Documented by: 


Cefepime HCl 2 gm/ Sodium (Chloride)  50 mls @ 100 mls/hr IV Q12H [facility]


Vancomycin HCl 1 gm/Vancomycin HCl 250 mg/ Sodium Chloride  250 mls @ 166.667 

mls/hr IV Q24H [facility]


Lactated Ringer's (Ringers, Lactated)  1,000 mls @ 50 mls/hr IV ASDIRECTED [facility]


   Last Admin: 12/17/21 11:24 Dose:  50 mls/hr


   Documented by: 


Lidocaine HCl (Lidocaine 2% Jelly 10 Ml Urojet)  10 ml MUCMEM ONETIME ONE


   Stop: 12/16/21 11:45


   Last Admin: 12/16/21 12:42 Dose:  Not Given


   Documented by: 


Magnesium Hydroxide (Magnesium Hydroxide 400 Mg/5 Ml Susp 30 Ml Cup)  30 ml PO 

ONETIME ONE


   Stop: 12/17/21 06:01


   Last Admin: 12/17/21 05:16 Dose:  Not Given


   Documented by: 


Ondansetron HCl (Ondansetron 4 Mg/2 Ml Sdv)  4 mg IV Q4H PRN


   PRN Reason: Nausea/Vomiting


Saccharomyces Boulardii (Saccharomyces Boulardii (Probiotic) 250 Mg Cap)  250 mg

PO DAILY [facility]


Senna/Docusate Sodium (Docusate Sodium/Sennosides 50-8.6 Mg Tab)  1 tab PO BID 

PRN


   PRN Reason: Constipation


Senna/Docusate Sodium (Docusate Sodium/Sennosides 50-8.6 Mg Tab)  2 tab PO QPM 

[facility]


Tamsulosin HCl (Tamsulosin 0.4 Mg Cap.Er)  0.4 mg PO BEDTIME [facility]


Vancomycin HCl (Pharmacy To Dose - Vancomycin)  1 dose .XX ASDIRECTED ONE


   Stop: 12/16/21 12:46


   Last Admin: 12/16/21 13:22 Dose:  Not Given


   Documented by: 


Vancomycin HCl (Pharmacy To Dose - Vancomycin)  1 dose .XX ASDIRECTED PRN


   PRN Reason: RX TO DOSE VANCO











- Exam


Quality Assessment: No: Supplemental Oxygen


General: Alert, Oriented


HEENT: Pupils Equal, Mucous Membr. Moist/Pink


Lungs: Clear to Auscultation, Normal Respiratory Effort


Cardiovascular: Regular Rate, Regular Rhythm


GI/Abdominal Exam: Normal Bowel Sounds, Soft, Non-Tender, No Organomegaly, No 

Distention, No Abnormal Bruit, No Mass


Extremities: Normal Inspection, Normal Range of Motion, Non-Tender, No Pedal 

Edema, Normal Capillary Refill


Skin: Warm, Dry, Intact


Neurological: No New Focal Deficit


Psy/Mental Status: Alert, Normal Affect, Normal Mood





- Patient Data


Lab Results Last 24 hrs: 


                         Laboratory Results - last 24 hr











  12/16/21 Range/Units





  15:15 


 


Procalcitonin  1.79 H  ng/mL











Result Diagrams: 


                                 12/18/21 09:59





                                 12/18/21 09:59


Oren Results Last 24 hrs: 


                                  Microbiology











 12/16/21 09:45 Urine Culture - Final





 Urine 


 


 12/16/21 09:04 Blood Culture - Preliminary





 Blood - Venous - Lab Draw 


 


 12/16/21 08:55 Blood Culture - Preliminary





 Blood - Venous 














Sepsis Event Note





- Evaluation


Sepsis Screening Result: No Definite Risk





- Focused Exam


Vital Signs: 


                                   Vital Signs











  Temp Pulse Resp BP Pulse Ox


 


 12/18/21 07:19  97.9 F  67  18  107/66  96


 


 12/18/21 03:27  97.5 F  83  14   96


 


 12/17/21 22:34  97.9 F  70  16  112/79  93 L














- Problem List & Annotations


(1) Sepsis


SNOMED Code(s): 65363654


   Code(s): A41.9 - SEPSIS, UNSPECIFIED ORGANISM   Status: Acute   Priority: 

High   Current Visit: No   


Qualifiers: 


   Sepsis type: sepsis due to unspecified organism   Sepsis acute organ 

dysfunction status: with acute organ dysfunction   Severe sepsis acute organ 

dysfunction type: unspecified   Severe sepsis shock status: without septic shock

  Qualified Code(s): A41.9 - Sepsis, unspecified organism; R65.20 - Severe 

sepsis without septic shock   





(2) UTI, Urinary tract infectious disease


SNOMED Code(s): 84307374


   Code(s): N39.0 - URINARY TRACT INFECTION, SITE NOT SPECIFIED   Status: Acute 

 Priority: High   Current Visit: No   





(3) Generalized weakness


SNOMED Code(s): 24453189


   Code(s): R53.1 - WEAKNESS   Status: Acute   Priority: High   Current Visit: 

No   





(4) Ptosis


SNOMED Code(s): 78783548


   Code(s): H02.409 - UNSPECIFIED PTOSIS OF UNSPECIFIED EYELID   Status: Chronic

  Priority: Low   Current Visit: No   


Qualifiers: 


   Laterality: left   Qualified Code(s): H02.402 - Unspecified ptosis of left 

eyelid   





(5) Recurrent UTI


SNOMED Code(s): 113527632


   Code(s): N39.0 - URINARY TRACT INFECTION, SITE NOT SPECIFIED   Status: 

Chronic   Priority: High   Current Visit: No   





(6) Shingles


SNOMED Code(s): 7930301


   Code(s): B02.9 - ZOSTER WITHOUT COMPLICATIONS   Status: Chronic   Priority: 

Low   Current Visit: No   


Qualifiers: 


   Herpes zoster complications: without complications   Qualified Code(s): B02.9

- Zoster without complications   





- Problem List Review


Problem List Initiated/Reviewed/Updated: Yes





- My Orders


Last 24 Hours: 


My Active Orders





12/17/21 10:59


Antiembolic Devices [RC] PER UNIT ROUTINE 


ESEQUIEL Hose [Antiembolic Hose] [OM.PC] Routine 





12/17/21 Lunch


Regular Diet [DIET] 














- Assessment


Assessment:: 


12/17/2021


This is an 86-year-old male admitted to the floor for treatment of UTI and 

sepsis.  Yesterday his lactic acid did come down to 1.9 on repeat after IV 

fluids and his lactated Ringer's infusion was decreased to 50 cc/h.  Since then 

he has been doing well.  He remains confused at times.  IV fluids have been 

discontinued.  He continues on vancomycin and cefepime.  Urine and blood 

cultures are still pending.  Procalcitonin is still pending.  WBC today has 

returned to normal at 6.93.  Hemoglobin is 12.0 with the decrease likely due to 

dilution.  Platelets are 214,000.  Neutrophils are within normal limits at 

67.8%.  Sodium is 137.  Potassium 4.2.  Chloride 105.  Carbon dioxide 28.  Anion

 gap is 15.2.  BUN is 22.  Creatinine 1.6.  GFR is 41.  Glucose is 104.  Calcium

 is 8.9.  CRP is up to 8.8.  MRSA screen yesterday was negative.  Dietitian has 

been in to see the patient and will start a supplement.  Patient has been 

refusing therapies and we discussed importance of this.  He agrees to work with 

him this afternoon.  He has been refusing his heparin.  He has a VTE score of 4 

and we will switch him to ESEQUIEL hose for now.  We will continue current treatment 

plan pending blood and urine cultures to direct antibiotic choice.  This along 

with outpatient dose with therapies will direct discharge plan.





12/18/2021


86-year-old male admitted with sepsis secondary to UTI. Patient is feeling much 

better and has been resistant to treatment. Patient is afebrile and white count 

7.41 is normal. CRP has also decreased to 3.2. Procalcitonin from 1216 was 1.79.

 He continues on vancomycin and cefepime for coverage of his previous UTI with 

Enterococcus and E. coli. Current urine was likely contaminated because of the 

mixed gram-positive and gram-negative isolates. Multiple species present. We 

will continue on IV antibiotics over the weekend and adjust to orals and likely 

discharge on Monday. This morning's labs continue to be stable and his 

creatinine is improved back to baseline. I will give him a lab holiday tomorrow.





- Plan


Plan:: 


UTI, Urinary tract infectious disease


Prostate disorder


Recurrent UTI


Elevated lactic acid level, resolved 


Sepsis, resolved 


Generalized weakness


* Started on cefepime and vancomycin in ED given fairly recent hospitalization 

  for UTIcontinue with pharmacy to dose vancomycin


* Blood cultures negative so far


* Urine cultures mixed likely contaminated


* Check procalcitonin -1.8


* Continue home Flomax


* As needed bladder scans


* Facility urinary retention protocol if needed


* IV fluids as indicated


* PT/OT


* Case management/social work consultation


* Will need urology follow-up after discharge


* Sepsis criteria: Leukocytosis, tachycardia and lactic acidosis-meets criteria.

    No hypotension noted. Resolved





Dementia


* No acute concerns


* Untreated


* Let me sleep protocol





Chronic renal insufficiency, stage III (moderate), improved 


* DC IV fluids


* Avoid nephrotoxic medications if able


* Baseline appears to be in the mid to upper ones for creatinine


* Monitor labs





Cerebral arterial aneurysm


Ptosis


* No acute concerns





Shingles


* No acute concerns


* Lesions have crusted over





AAA (abdominal aortic aneurysm)


* No acute concerns





COPD (chronic obstructive pulmonary disease)


* No acute concerns


* As needed DuoNebs


* PRN albuterol MDI





Chronic back pain


* PT/OT


* Pain medications as ordered





Code status: Full code


PCP: [facility]


DVT prophylaxis: Heparin Subcutaneous


Social: Patient resides at [facility]


Disposition: Admitted to hospital floor inpatient with telemetry for management 

of UTI and sepsis.  Likely length of stay 3 to 4 days pending culture results 

and progress.

## 2021-12-19 NOTE — PCM.PN
- General Info


Date of Service: 12/19/21


Admission Dx/Problem (Free Text): 


                           Admission Diagnosis/Problem





Admission Diagnosis/Problem      Urosepsis








Subjective Update: 





Patient without complaints. Blood culture pulmonary results were positive for 

gram-positive rods and 1 set of 2 blood cultures.





- Patient Data


Vitals - Most Recent: 


                                Last Vital Signs











Temp  97.3 F   12/19/21 11:22


 


Pulse  64   12/19/21 11:22


 


Resp  18   12/19/21 11:22


 


BP  100/75   12/19/21 11:22


 


Pulse Ox  94 L  12/19/21 11:22











Weight - Most Recent: 143 lb 1.6 oz


I&O - Last 24 Hours: 


                                 Intake & Output











 12/18/21 12/19/21 12/19/21





 22:59 06:59 14:59


 


Intake Total 1188 350 


 


Output Total 975 775 


 


Balance 213 -425 











Oren Results Last 24 Hours: 


                                  Microbiology











 12/16/21 08:55 Blood Culture - Preliminary





 Blood - Venous    Gram Positive Rods











Med Orders - Current: 


                               Current Medications





Acetaminophen (Acetaminophen 325 Mg Tab)  650 mg PO Q4H PRN


   PRN Reason: Pain (Mild 1-3)/fever


Albuterol (Albuterol 6.7 Gm Inhaler)  0 gm INH Q2H PRN


   PRN Reason: SOB/Wheezing


Albuterol/Ipratropium (Albuterol/Ipratropium 3.0-0.5 Mg/3 Ml Neb Soln)  3 ml NEB

QIDRT PRN


   PRN Reason: Shortness Of Breath/wheezing


Cholecalciferol (Cholecalciferol (Vitamin D3) 25 Mcg Tab)  50 mcg PO DAILY [facility]


   Last Admin: 12/19/21 08:25 Dose:  50 mcg


   Documented by: 


Ferrous Sulfate (Ferrous Sulfate 324 Mg Tab.Ec)  324 mg PO DAILY [facility]


   Last Admin: 12/19/21 08:26 Dose:  324 mg


   Documented by: 


Heparin Sodium (Porcine) (Heparin Sodium 5,000 Units/Ml Vial)  5,000 units SUBC

UT Q8H [facility]


   Last Admin: 12/19/21 08:26 Dose:  Not Given


   Documented by: 


Cefepime HCl 2 gm/ Sodium (Chloride)  50 mls @ 100 mls/hr IV Q12H [facility]


   Last Admin: 12/19/21 13:53 Dose:  100 mls/hr


   Documented by: 


Vancomycin HCl 1 gm/Vancomycin HCl 250 mg/ Sodium Chloride  250 mls @ 166.667 

mls/hr IV Q24H [facility]


   Last Admin: 12/19/21 14:36 Dose:  166.667 mls/hr


   Documented by: 


Ondansetron HCl (Ondansetron 4 Mg/2 Ml Sdv)  4 mg IV Q6H PRN


   PRN Reason: Nausea/Vomiting


Oxycodone HCl (Oxycodone 5 Mg Tab)  5 mg PO Q4H PRN


   PRN Reason: Pain (moderate 4-6)


Saccharomyces Boulardii (Saccharomyces Boulardii (Probiotic) 250 Mg Cap)  250 mg

PO DAILY [facility]


   Last Admin: 12/19/21 08:26 Dose:  250 mg


   Documented by: 


Senna/Docusate Sodium (Docusate Sodium/Sennosides 50-8.6 Mg Tab)  1 tab PO BID 

PRN


   PRN Reason: Constipation


Senna/Docusate Sodium (Docusate Sodium/Sennosides 50-8.6 Mg Tab)  2 tab PO QPM 

[facility]


   Last Admin: 12/18/21 17:33 Dose:  2 tab


   Documented by: 


Sodium Chloride (Sodium Chloride 0.9% 10 Ml Syringe)  10 ml FLUSH ASDIRECTED PRN


   PRN Reason: Keep Vein Open


   Last Admin: 12/16/21 09:16 Dose:  10 ml


   Documented by: 


Tamsulosin HCl (Tamsulosin 0.4 Mg Cap.Er)  0.4 mg PO BEDTIME [facility]


   Last Admin: 12/18/21 20:28 Dose:  0.4 mg


   Documented by: 


Vancomycin HCl (Pharmacy To Dose - Vancomycin)  1 dose .XX ASDIRECTED PRN


   PRN Reason: RX TO DOSE VANCO





Discontinued Medications





Acetaminophen (Acetaminophen 325 Mg Tab)  650 mg PO Q4H PRN


   PRN Reason: Pain (Mild 1-3)/fever


Albuterol (Albuterol 6.7 Gm Inhaler)  0 gm INH Q2H PRN


   PRN Reason: SOB/Wheezing


Albuterol/Ipratropium (Albuterol/Ipratropium 3.0-0.5 Mg/3 Ml Neb Soln)  3 ml NEB

Q6HRRT PRN


   PRN Reason: wheezing/SOB/cough


Cholecalciferol (Cholecalciferol (Vitamin D3) 25 Mcg Tab)  50 mcg PO DAILY [facility]


Ferrous Sulfate (Ferrous Sulfate 324 Mg Tab.Ec)  324 mg PO DAILY [facility]


Heparin Sodium (Porcine) (Heparin Sodium 5,000 Units/Ml Vial)  5,000 units 

SUBCUT Q8H [facility]


Sodium Chloride (Normal Saline)  1,000 mls @ 126 mls/hr IV ASDIRECTED [facility]


   Last Admin: 12/16/21 09:16 Dose:  126 mls/hr


   Documented by: 


Sodium Chloride (Normal Saline)  1,000 mls @ 250 mls/hr IV ASDIRECTED [facility]


   Last Admin: 12/16/21 10:14 Dose:  250 mls/hr


   Documented by: 


Cefepime HCl 2 gm/ Sodium (Chloride)  50 mls @ 100 mls/hr IV ONETIME ONE


   Stop: 12/16/21 12:51


   Last Admin: 12/16/21 12:36 Dose:  100 mls/hr


   Documented by: 


Vancomycin HCl 1 gm/Vancomycin HCl 250 mg/ Sodium Chloride  250 mls @ 166 mls/hr

IV ONETIME ONE


   Stop: 12/16/21 14:45


   Last Admin: 12/16/21 13:26 Dose:  166 mls/hr


   Documented by: 


Lactated Ringer's (Ringers, Lactated)  1,000 mls @ 250 mls/hr IV ASDIRECTED [facility]


   Last Infusion: 12/16/21 16:56 Dose:  50 mls/hr


   Documented by: 


Cefepime HCl 2 gm/ Sodium (Chloride)  50 mls @ 100 mls/hr IV Q12H [facility]


Vancomycin HCl 1 gm/Vancomycin HCl 250 mg/ Sodium Chloride  250 mls @ 166.667 

mls/hr IV Q24H [facility]


Lactated Ringer's (Ringers, Lactated)  1,000 mls @ 50 mls/hr IV ASDIRECTED [facility]


   Last Admin: 12/17/21 11:24 Dose:  50 mls/hr


   Documented by: 


Lidocaine HCl (Lidocaine 2% Jelly 10 Ml Urojet)  10 ml MUCMEM ONETIME ONE


   Stop: 12/16/21 11:45


   Last Admin: 12/16/21 12:42 Dose:  Not Given


   Documented by: 


Magnesium Hydroxide (Magnesium Hydroxide 400 Mg/5 Ml Susp 30 Ml Cup)  30 ml PO 

ONETIME ONE


   Stop: 12/17/21 06:01


   Last Admin: 12/17/21 05:16 Dose:  Not Given


   Documented by: 


Ondansetron HCl (Ondansetron 4 Mg/2 Ml Sdv)  4 mg IV Q4H PRN


   PRN Reason: Nausea/Vomiting


Saccharomyces Boulardii (Saccharomyces Boulardii (Probiotic) 250 Mg Cap)  250 mg

PO DAILY [facility]


Senna/Docusate Sodium (Docusate Sodium/Sennosides 50-8.6 Mg Tab)  1 tab PO BID 

PRN


   PRN Reason: Constipation


Senna/Docusate Sodium (Docusate Sodium/Sennosides 50-8.6 Mg Tab)  2 tab PO QPM 

[facility]


Tamsulosin HCl (Tamsulosin 0.4 Mg Cap.Er)  0.4 mg PO BEDTIME [facility]


Vancomycin HCl (Pharmacy To Dose - Vancomycin)  1 dose .XX ASDIRECTED ONE


   Stop: 12/16/21 12:46


   Last Admin: 12/16/21 13:22 Dose:  Not Given


   Documented by: 


Vancomycin HCl (Pharmacy To Dose - Vancomycin)  1 dose .XX ASDIRECTED PRN


   PRN Reason: RX TO DOSE VANCO











- Exam


Quality Assessment: No: Supplemental Oxygen


General: Alert, Oriented


HEENT: Pupils Equal, Mucous Membr. Moist/Pink


Neck: Supple


Lungs: Clear to Auscultation, Normal Respiratory Effort


Cardiovascular: Regular Rate, Regular Rhythm


GI/Abdominal Exam: Normal Bowel Sounds, Soft, Non-Tender


Extremities: Normal Inspection, Normal Range of Motion, Non-Tender, No Pedal 

Edema, Normal Capillary Refill


Psy/Mental Status: Alert, Normal Affect, Normal Mood





- Patient Data


Result Diagrams: 


                                 12/18/21 09:59





                                 12/18/21 09:59


Oren Results Last 24 hrs: 


                                  Microbiology











 12/16/21 08:55 Blood Culture - Preliminary





 Blood - Venous    Gram Positive Rods














Sepsis Event Note





- Evaluation


Sepsis Screening Result: No Definite Risk





- Focused Exam


Vital Signs: 


                                   Vital Signs











  Temp Pulse Resp BP Pulse Ox


 


 12/19/21 11:22  97.3 F  64  18  100/75  94 L


 


 12/19/21 07:32  97.3 F  62  16  98/54 L  93 L


 


 12/19/21 04:53  98.2 F  66  14  108/63  93 L














- Problem List & Annotations


(1) Sepsis


SNOMED Code(s): 96834644


   Code(s): A41.9 - SEPSIS, UNSPECIFIED ORGANISM   Status: Acute   Priority: 

High   Current Visit: No   


Qualifiers: 


   Sepsis type: sepsis due to unspecified organism   Sepsis acute organ 

dysfunction status: with acute organ dysfunction   Severe sepsis acute organ 

dysfunction type: unspecified   Severe sepsis shock status: without septic shock

  Qualified Code(s): A41.9 - Sepsis, unspecified organism; R65.20 - Severe 

sepsis without septic shock   





(2) UTI, Urinary tract infectious disease


SNOMED Code(s): 73669391


   Code(s): N39.0 - URINARY TRACT INFECTION, SITE NOT SPECIFIED   Status: Acute 

 Priority: High   Current Visit: No   





(3) Generalized weakness


SNOMED Code(s): 70265196


   Code(s): R53.1 - WEAKNESS   Status: Acute   Priority: High   Current Visit: 

No   





(4) Ptosis


SNOMED Code(s): 24400433


   Code(s): H02.409 - UNSPECIFIED PTOSIS OF UNSPECIFIED EYELID   Status: Chronic

  Priority: Low   Current Visit: No   


Qualifiers: 


   Laterality: left   Qualified Code(s): H02.402 - Unspecified ptosis of left 

eyelid   





(5) Recurrent UTI


SNOMED Code(s): 706333296


   Code(s): N39.0 - URINARY TRACT INFECTION, SITE NOT SPECIFIED   Status: 

Chronic   Priority: High   Current Visit: No   





(6) Shingles


SNOMED Code(s): 0475671


   Code(s): B02.9 - ZOSTER WITHOUT COMPLICATIONS   Status: Chronic   Priority: 

Low   Current Visit: No   


Qualifiers: 


   Herpes zoster complications: without complications   Qualified Code(s): B02.9

- Zoster without complications   





- Problem List Review


Problem List Initiated/Reviewed/Updated: Yes





- My Orders


Last 24 Hours: 


My Active Orders





12/21/21 12:30


VANCOMYCIN TROUGH [CHEM] Timed 














- Assessment


Assessment:: 


12/17/2021


This is an 86-year-old male admitted to the floor for treatment of UTI and 

sepsis.  Yesterday his lactic acid did come down to 1.9 on repeat after IV 

fluids and his lactated Ringer's infusion was decreased to 50 cc/h.  Since then 

he has been doing well.  He remains confused at times.  IV fluids have been 

discontinued.  He continues on vancomycin and cefepime.  Urine and blood 

cultures are still pending.  Procalcitonin is still pending.  WBC today has 

returned to normal at 6.93.  Hemoglobin is 12.0 with the decrease likely due to 

dilution.  Platelets are 214,000.  Neutrophils are within normal limits at 

67.8%.  Sodium is 137.  Potassium 4.2.  Chloride 105.  Carbon dioxide 28.  Anion

 gap is 15.2.  BUN is 22.  Creatinine 1.6.  GFR is 41.  Glucose is 104.  Calcium

 is 8.9.  CRP is up to 8.8.  MRSA screen yesterday was negative.  Dietitian has 

been in to see the patient and will start a supplement.  Patient has been 

refusing therapies and we discussed importance of this.  He agrees to work with 

him this afternoon.  He has been refusing his heparin.  He has a VTE score of 4 

and we will switch him to ESEQUIEL hose for now.  We will continue current treatment 

plan pending blood and urine cultures to direct antibiotic choice.  This along 

with outpatient dose with therapies will direct discharge plan.





12/18/2021


86-year-old male admitted with sepsis secondary to UTI. Patient is feeling much 

better and has been resistant to treatment. Patient is afebrile and white count 

7.41 is normal. CRP has also decreased to 3.2. Procalcitonin from 1216 was 1.79.

 He continues on vancomycin and cefepime for coverage of his previous UTI with 

Enterococcus and E. coli. Current urine was likely contaminated because of the 

mixed gram-positive and gram-negative isolates. Multiple species present. We 

will continue on IV antibiotics over the weekend and adjust to orals and likely 

discharge on Monday. This morning's labs continue to be stable and his 

creatinine is improved back to baseline. I will give him a lab holiday tomorrow.





12/18/2021


86-year-old male admitted with sepsis secondary to UTI growing gram-positive 

cocci from 1 of 2 sets of blood cultures. Patient currently is on cefepime and 

vancomycin for antibiotic coverage. No labs done today. Patient is stable and 

doing well.





- Plan


Plan:: 


UTI, Urinary tract infectious disease


Prostate disorder


Recurrent UTI


Elevated lactic acid level, resolved 


Sepsis, resolved 


Generalized weakness


* Started on cefepime and vancomycin in ED given fairly recent hospitalization 

  for UTIcontinue with pharmacy to dose vancomycin


* Blood cultures negative so far


* Urine cultures mixed likely contaminated


* procalcitonin -1.8


* Continue home Flomax


* As needed bladder scans


* Facility urinary retention protocol if needed


* IV fluids as indicated


* PT/OT


* Case management/social work consultation


* Will need urology follow-up after discharge


* Sepsis criteria: Leukocytosis, tachycardia and lactic acidosis-meets criteria.

    No hypotension noted. Resolved


* No change in management





Dementia


* No acute concerns


* Untreated


* Let me sleep protocol





Chronic renal insufficiency, stage III (moderate), improved 


* DC IV fluids


* Avoid nephrotoxic medications if able


* Baseline appears to be in the mid to upper ones for creatinine


* Monitor labs





Cerebral arterial aneurysm


Ptosis


* No acute concerns





Shingles


* No acute concerns


* Lesions have crusted over





AAA (abdominal aortic aneurysm)


* No acute concerns





COPD (chronic obstructive pulmonary disease)


* No acute concerns


* As needed DuoNebs


* PRN albuterol MDI





Chronic back pain


* PT/OT


* Pain medications as ordered





Code status: Full code


PCP: [facility]


DVT prophylaxis: Heparin Subcutaneous


Social: Patient resides at [facility]


Disposition: Admitted to hospital floor inpatient with telemetry for management 

of UTI and sepsis.  Likely length of stay 3 to 4 days pending culture results 

and progress.


Plan discharge in 1 to 2 days based on blood cultures.

## 2021-12-20 NOTE — PCM.PN
- General Info


Date of Service: 12/20/21


Admission Dx/Problem (Free Text): 


                           Admission Diagnosis/Problem





Admission Diagnosis/Problem      Urosepsis








Functional Status: Reports: Pain Controlled, Tolerating Diet, Ambulating, 

Urinating.  Denies: New Symptoms





- Review of Systems


General: Reports: Weakness (improving ).  Denies: Fever, Fatigue, Malaise, 

Chills


HEENT: Reports: No Symptoms.  Denies: Headaches, Sore Throat


Pulmonary: Reports: No Symptoms.  Denies: Shortness of Breath, Cough, Sputum, 

Wheezing


Cardiovascular: Reports: No Symptoms.  Denies: Chest Pain, Palpitations, Dyspnea

on Exertion, Edema


Gastrointestinal: Reports: No Symptoms.  Denies: Abdominal Pain, Constipation, 

Diarrhea, Nausea, Vomiting


Genitourinary: Reports: No Symptoms.  Denies: Pain


Musculoskeletal: Reports: No Symptoms.  Denies: Neck Pain


Skin: Reports: No Symptoms.  Denies: Cyanosis


Neurological: Reports: Confusion, Pre-Existing Deficit (Baseline dementia), 

Difficulty Walking.  Denies: Dizziness, Headache, Numbness, Seizure, Syncope, 

Tingling, Gait Disturbance


Psychiatric: Reports: No Symptoms





- Patient Data


Vitals - Most Recent: 


                                Last Vital Signs











Temp  98.2 F   12/19/21 21:09


 


Pulse  62   12/19/21 21:09


 


Resp  14   12/19/21 21:09


 


BP  110/61   12/19/21 21:09


 


Pulse Ox  94 L  12/19/21 21:09











Weight - Most Recent: 143 lb 1.6 oz


I&O - Last 24 Hours: 


                                 Intake & Output











 12/19/21 12/19/21 12/20/21





 14:59 22:59 06:59


 


Intake Total  800 


 


Output Total  450 


 


Balance  350 











Oren Results Last 24 Hours: 


                                  Microbiology











 12/16/21 08:55 Bacteria Detection (PCR) - Final





 Blood 


 


 12/16/21 08:55 Blood Culture - Preliminary





 Blood - Venous    Gram Positive Rods











Med Orders - Current: 


                               Current Medications





Acetaminophen (Acetaminophen 325 Mg Tab)  650 mg PO Q4H PRN


   PRN Reason: Pain (Mild 1-3)/fever


   Last Admin: 12/19/21 17:34 Dose:  650 mg


   Documented by: 


Albuterol (Albuterol 6.7 Gm Inhaler)  0 gm INH Q2H PRN


   PRN Reason: SOB/Wheezing


Albuterol/Ipratropium (Albuterol/Ipratropium 3.0-0.5 Mg/3 Ml Neb Soln)  3 ml NEB

QIDRT PRN


   PRN Reason: Shortness Of Breath/wheezing


Cholecalciferol (Cholecalciferol (Vitamin D3) 25 Mcg Tab)  50 mcg PO DAILY [facility]


   Last Admin: 12/19/21 08:25 Dose:  50 mcg


   Documented by: 


Ferrous Sulfate (Ferrous Sulfate 324 Mg Tab.Ec)  324 mg PO DAILY [facility]


   Last Admin: 12/19/21 08:26 Dose:  324 mg


   Documented by: 


Heparin Sodium (Porcine) (Heparin Sodium 5,000 Units/Ml Vial)  5,000 units 

SUBCUT Q8H [facility]


   Last Admin: 12/20/21 00:31 Dose:  Not Given


   Documented by: 


Cefepime HCl 2 gm/ Sodium (Chloride)  50 mls @ 100 mls/hr IV Q12H [facility]


   Last Admin: 12/20/21 01:06 Dose:  100 mls/hr


   Documented by: 


Vancomycin HCl 1 gm/Vancomycin HCl 250 mg/ Sodium Chloride  250 mls @ 166.667 

mls/hr IV Q24H [facility]


   Last Admin: 12/19/21 14:36 Dose:  166.667 mls/hr


   Documented by: 


Ondansetron HCl (Ondansetron 4 Mg/2 Ml Sdv)  4 mg IV Q6H PRN


   PRN Reason: Nausea/Vomiting


Oxycodone HCl (Oxycodone 5 Mg Tab)  5 mg PO Q4H PRN


   PRN Reason: Pain (moderate 4-6)


Saccharomyces Boulardii (Saccharomyces Boulardii (Probiotic) 250 Mg Cap)  250 mg

PO DAILY [facility]


   Last Admin: 12/19/21 08:26 Dose:  250 mg


   Documented by: 


Senna/Docusate Sodium (Docusate Sodium/Sennosides 50-8.6 Mg Tab)  1 tab PO BID 

PRN


   PRN Reason: Constipation


Senna/Docusate Sodium (Docusate Sodium/Sennosides 50-8.6 Mg Tab)  2 tab PO QPM 

[facility]


   Last Admin: 12/19/21 17:13 Dose:  2 tab


   Documented by: 


Sodium Chloride (Sodium Chloride 0.9% 10 Ml Syringe)  10 ml FLUSH ASDIRECTED PRN


   PRN Reason: Keep Vein Open


   Last Admin: 12/16/21 09:16 Dose:  10 ml


   Documented by: 


Tamsulosin HCl (Tamsulosin 0.4 Mg Cap.Er)  0.4 mg PO BEDTIME [facility]


   Last Admin: 12/19/21 21:11 Dose:  0.4 mg


   Documented by: 


Vancomycin HCl (Pharmacy To Dose - Vancomycin)  1 dose .XX ASDIRECTED PRN


   PRN Reason: RX TO DOSE VANCO





Discontinued Medications





Acetaminophen (Acetaminophen 325 Mg Tab)  650 mg PO Q4H PRN


   PRN Reason: Pain (Mild 1-3)/fever


Albuterol (Albuterol 6.7 Gm Inhaler)  0 gm INH Q2H PRN


   PRN Reason: SOB/Wheezing


Albuterol/Ipratropium (Albuterol/Ipratropium 3.0-0.5 Mg/3 Ml Neb Soln)  3 ml NEB

Q6HRRT PRN


   PRN Reason: wheezing/SOB/cough


Cholecalciferol (Cholecalciferol (Vitamin D3) 25 Mcg Tab)  50 mcg PO DAILY [facility]


Ferrous Sulfate (Ferrous Sulfate 324 Mg Tab.Ec)  324 mg PO DAILY [facility]


Heparin Sodium (Porcine) (Heparin Sodium 5,000 Units/Ml Vial)  5,000 units 

SUBCUT Q8H [facility]


Sodium Chloride (Normal Saline)  1,000 mls @ 126 mls/hr IV ASDIRECTED [facility]


   Last Admin: 12/16/21 09:16 Dose:  126 mls/hr


   Documented by: 


Sodium Chloride (Normal Saline)  1,000 mls @ 250 mls/hr IV ASDIRECTED [facility]


   Last Admin: 12/16/21 10:14 Dose:  250 mls/hr


   Documented by: 


Cefepime HCl 2 gm/ Sodium (Chloride)  50 mls @ 100 mls/hr IV ONETIME ONE


   Stop: 12/16/21 12:51


   Last Admin: 12/16/21 12:36 Dose:  100 mls/hr


   Documented by: 


Vancomycin HCl 1 gm/Vancomycin HCl 250 mg/ Sodium Chloride  250 mls @ 166 mls/hr

IV ONETIME ONE


   Stop: 12/16/21 14:45


   Last Admin: 12/16/21 13:26 Dose:  166 mls/hr


   Documented by: 


Lactated Ringer's (Ringers, Lactated)  1,000 mls @ 250 mls/hr IV ASDIRECTED [facility]


   Last Infusion: 12/16/21 16:56 Dose:  50 mls/hr


   Documented by: 


Cefepime HCl 2 gm/ Sodium (Chloride)  50 mls @ 100 mls/hr IV Q12H [facility]


Vancomycin HCl 1 gm/Vancomycin HCl 250 mg/ Sodium Chloride  250 mls @ 166.667 

mls/hr IV Q24H [facility]


Lactated Ringer's (Ringers, Lactated)  1,000 mls @ 50 mls/hr IV ASDIRECTED [facility]


   Last Admin: 12/17/21 11:24 Dose:  50 mls/hr


   Documented by: 


Lidocaine HCl (Lidocaine 2% Jelly 10 Ml Urojet)  10 ml MUCMEM ONETIME ONE


   Stop: 12/16/21 11:45


   Last Admin: 12/16/21 12:42 Dose:  Not Given


   Documented by: 


Magnesium Hydroxide (Magnesium Hydroxide 400 Mg/5 Ml Susp 30 Ml Cup)  30 ml PO 

ONETIME ONE


   Stop: 12/17/21 06:01


   Last Admin: 12/17/21 05:16 Dose:  Not Given


   Documented by: 


Ondansetron HCl (Ondansetron 4 Mg/2 Ml Sdv)  4 mg IV Q4H PRN


   PRN Reason: Nausea/Vomiting


Saccharomyces Boulardii (Saccharomyces Boulardii (Probiotic) 250 Mg Cap)  250 mg

PO DAILY [facility]


Senna/Docusate Sodium (Docusate Sodium/Sennosides 50-8.6 Mg Tab)  1 tab PO BID 

PRN


   PRN Reason: Constipation


Senna/Docusate Sodium (Docusate Sodium/Sennosides 50-8.6 Mg Tab)  2 tab PO QPM 

[facility]


Tamsulosin HCl (Tamsulosin 0.4 Mg Cap.Er)  0.4 mg PO BEDTIME [facility]


Vancomycin HCl (Pharmacy To Dose - Vancomycin)  1 dose .XX ASDIRECTED ONE


   Stop: 12/16/21 12:46


   Last Admin: 12/16/21 13:22 Dose:  Not Given


   Documented by: 


Vancomycin HCl (Pharmacy To Dose - Vancomycin)  1 dose .XX ASDIRECTED PRN


   PRN Reason: RX TO DOSE VANCO











- Exam


Quality Assessment: DVT Prophylaxis.  No: Supplemental Oxygen, Urine Catheter


General: Alert, Cooperative, No Acute Distress


HEENT: Pupils Equal, Pupils Reactive, Mucous Membr. Moist/Pink


Neck: Supple, Trachea Midline


Lungs: Clear to Auscultation, Normal Respiratory Effort


Cardiovascular: Regular Rate, Regular Rhythm


GI/Abdominal Exam: Normal Bowel Sounds, Soft, Non-Tender, No Distention


 (Male) Exam: Deferred


Back Exam: Normal Inspection, Full Range of Motion


Extremities: Normal Inspection, Normal Range of Motion, Non-Tender, No Pedal 

Edema, Normal Capillary Refill


Skin: Warm, Dry, Intact


Neurological: No New Focal Deficit


Psy/Mental Status: Alert





- Patient Data


Result Diagrams: 


                                 12/20/21 09:12





                                 12/20/21 09:12


Oren Results Last 24 hrs: 


                                  Microbiology











 12/16/21 08:55 Bacteria Detection (PCR) - Final





 Blood 


 


 12/16/21 08:55 Blood Culture - Preliminary





 Blood - Venous    Gram Positive Rods














Sepsis Event Note





- Evaluation


Sepsis Screening Result: No Definite Risk





- Focused Exam


Vital Signs: 


                                   Vital Signs











  Temp Pulse Resp BP Pulse Ox


 


 12/19/21 21:09  98.2 F  62  14  110/61  94 L














- Problem List & Annotations


(1) Dementia


SNOMED Code(s): 76142514


   Code(s): F03.90 - UNSPECIFIED DEMENTIA WITHOUT BEHAVIORAL DISTURBANCE   

Status: Chronic   Priority: Low   Current Visit: No   


Qualifiers: 


   Dementia type: unspecified type   Dementia behavioral disturbance: without b

ehavioral disturbance   Qualified Code(s): F03.90 - Unspecified dementia without

behavioral disturbance   





(2) Generalized weakness


SNOMED Code(s): 07159166


   Code(s): R53.1 - WEAKNESS   Status: Acute   Priority: High   Current Visit: 

No   





(3) Ptosis


SNOMED Code(s): 94959396


   Code(s): H02.409 - UNSPECIFIED PTOSIS OF UNSPECIFIED EYELID   Status: Chronic

  Priority: Low   Current Visit: No   


Qualifiers: 


   Laterality: left   Qualified Code(s): H02.402 - Unspecified ptosis of left 

eyelid   





(4) Shingles


SNOMED Code(s): 9560107


   Code(s): B02.9 - ZOSTER WITHOUT COMPLICATIONS   Status: Chronic   Priority: 

Low   Current Visit: No   


Qualifiers: 


   Herpes zoster complications: without complications   Qualified Code(s): B02.9

- Zoster without complications   





(5) UTI, Urinary tract infectious disease


SNOMED Code(s): 60252104


   Code(s): N39.0 - URINARY TRACT INFECTION, SITE NOT SPECIFIED   Status: Acute 

 Priority: High   Current Visit: No   





(6) AAA (abdominal aortic aneurysm)


SNOMED Code(s): 453083763


   Code(s): I71.4 - ABDOMINAL AORTIC ANEURYSM, WITHOUT RUPTURE   Status: Chronic

  Priority: Medium   Current Visit: No   


Qualifiers: 


   Presence of rupture: without rupture   Qualified Code(s): I71.4 - Abdominal 

aortic aneurysm, without rupture   





(7) COPD (chronic obstructive pulmonary disease)


SNOMED Code(s): 11771730


   Code(s): J44.9 - CHRONIC OBSTRUCTIVE PULMONARY DISEASE, UNSPECIFIED   Status:

Chronic   Priority: Medium   Current Visit: No   


Qualifiers: 


   COPD type: unspecified COPD   Qualified Code(s): J44.9 - Chronic obstructive 

pulmonary disease, unspecified   





(8) Cerebral arterial aneurysm


SNOMED Code(s): 327546680


   Code(s): I67.1 - CEREBRAL ANEURYSM, NONRUPTURED   Status: Chronic   Priority:

Low   Current Visit: No   





(9) Chronic back pain


SNOMED Code(s): 570143611


   Code(s): M54.9 - DORSALGIA, UNSPECIFIED; G89.29 - OTHER CHRONIC PAIN   

Status: Chronic   Priority: Low   Current Visit: No   


Qualifiers: 


   Back pain location: back pain in unspecified location   Back pain laterality:

unspecified   Qualified Code(s): M54.9 - Dorsalgia, unspecified; G89.29 - Other 

chronic pain   





(10) Chronic renal insufficiency, stage III (moderate)


SNOMED Code(s): 840781190


   Code(s): N18.30 - CHRONIC KIDNEY DISEASE, STAGE 3 UNSPECIFIED   Status: 

Chronic   Priority: Medium   Current Visit: No   


Qualifiers: 


   Chronic kidney disease stage 3 subtype: stage 3a (GFR 45-59)   Qualified 

Code(s): N18.31 - Chronic kidney disease, stage 3a   





(11) Prostate disorder


SNOMED Code(s): 39042986


   Code(s): N42.9 - DISORDER OF PROSTATE, UNSPECIFIED   Status: Chronic   

Priority: Medium   Current Visit: No   





(12) Recurrent UTI


SNOMED Code(s): 819883796


   Code(s): N39.0 - URINARY TRACT INFECTION, SITE NOT SPECIFIED   Status: 

Chronic   Priority: High   Current Visit: No   





(13) Elevated lactic acid level


SNOMED Code(s): 1742982


   Code(s): R79.89 - OTHER SPECIFIED ABNORMAL FINDINGS OF BLOOD CHEMISTRY   

Status: Resolved   Priority: High   Current Visit: No   





(14) Sepsis


SNOMED Code(s): 48451311


   Code(s): A41.9 - SEPSIS, UNSPECIFIED ORGANISM   Status: Resolved   Priority: 

High   Current Visit: No   


Qualifiers: 


   Sepsis type: sepsis due to unspecified organism   Sepsis acute organ 

dysfunction status: with acute organ dysfunction   Severe sepsis acute organ 

dysfunction type: unspecified   Severe sepsis shock status: without septic shock

  Qualified Code(s): A41.9 - Sepsis, unspecified organism; R65.20 - Severe 

sepsis without septic shock   





(15) Positive blood culture


SNOMED Code(s): 196409304


   Code(s): R78.81 - BACTEREMIA   Status: Acute   Priority: High   Current 

Visit: Yes   





- Problem List Review


Problem List Initiated/Reviewed/Updated: Yes





- Assessment


Assessment:: 


12/17/2021


This is an 86-year-old male admitted to the floor for treatment of UTI and 

sepsis.  Yesterday his lactic acid did come down to 1.9 on repeat after IV 

fluids and his lactated Ringer's infusion was decreased to 50 cc/h.  Since then 

he has been doing well.  He remains confused at times.  IV fluids have been 

discontinued.  He continues on vancomycin and cefepime.  Urine and blood 

cultures are still pending.  Procalcitonin is still pending.  WBC today has ret

urned to normal at 6.93.  Hemoglobin is 12.0 with the decrease likely due to 

dilution.  Platelets are 214,000.  Neutrophils are within normal limits at 

67.8%.  Sodium is 137.  Potassium 4.2.  Chloride 105.  Carbon dioxide 28.  Anion

gap is 15.2.  BUN is 22.  Creatinine 1.6.  GFR is 41.  Glucose is 104.  Calcium 

is 8.9.  CRP is up to 8.8.  MRSA screen yesterday was negative.  Dietitian has 

been in to see the patient and will start a supplement.  Patient has been 

refusing therapies and we discussed importance of this.  He agrees to work with 

him this afternoon.  He has been refusing his heparin.  He has a VTE score of 4 

and we will switch him to ESEQUIEL hose for now.  We will continue current treatment 

plan pending blood and urine cultures to direct antibiotic choice.  This along 

with outpatient dose with therapies will direct discharge plan.





12/18/2021


86-year-old male admitted with sepsis secondary to UTI. Patient is feeling much 

better and has been resistant to treatment. Patient is afebrile and white count 

7.41 is normal. CRP has also decreased to 3.2. Procalcitonin from 1216 was 1.79.

He continues on vancomycin and cefepime for coverage of his previous UTI with 

Enterococcus and E. coli. Current urine was likely contaminated because of the 

mixed gram-positive and gram-negative isolates. Multiple species present. We 

will continue on IV antibiotics over the weekend and adjust to orals and likely 

discharge on Monday. This morning's labs continue to be stable and his 

creatinine is improved back to baseline. I will give him a lab holiday tomorrow.





12/19/2021


86-year-old male admitted with sepsis secondary to UTI growing gram-positive 

cocci from 1 of 2 sets of blood cultures. Patient currently is on cefepime and 

vancomycin for antibiotic coverage. No labs done today. Patient is stable and 

doing well.





12/20/2021


86-year-old male admitted for sepsis secondary to suspected UTI.  Patient's 

urine culture grows mixed do suggesting contamination.  Blood cultures 

unexpectedly returned 1 of 2 bottles positive for gram-positive rods.  It is 

felt this is likely contaminant as it took several days after blood draw to be 

positive and PCR testing on the blood was negative..  We will repeat cultures 

today.  Overall patient continues to do quite well. Labs were obtained today 

showing a WBC of 6.84.  Hemoglobin 12.1.  Platelet 218,000.  Neutrophils are 

normal at 47.3.  Sodium is 140.  Potassium 3.6.  Chloride 107.  Carbon dioxide 

22.  Anion gap 14.6.  BUN is 21.  Creatinine 1.4.  GFR is 48.  Glucose is 101.  

CRP is 1.2.  Patient is on cefepime and vancomycin.  We will continue this 

pending repeat labs and culture results.  Hopeful for discharge tomorrow pending

continued stability.  Patient has been working with PT and OT who are 

recommending return to [facility].





- Plan


Plan:: 


UTI, Urinary tract infectious disease


Prostate disorder


Recurrent UTI


Elevated lactic acid level, resolved 


Sepsis, resolved 


Generalized weakness, improved 


* Started on cefepime and vancomycin in ED given fairly recent hospitalization 

  for UTIcontinue with pharmacy to dose vancomycin


* Blood cultures show 1/2 gram positive 


* Urine cultures mixed likely contaminated


* procalcitonin 1.8


* Continue home Flomax


* As needed bladder scans


* Facility urinary retention protocol if needed


* IV fluids as indicated


* PT/OT


* Case management/social work consultation


* Will need urology follow-up after discharge


* Sepsis criteria on admission: Leukocytosis, tachycardia and lactic acidosis-

  meets criteria.  No hypotension noted. (Resolved now)





Bacteremia


* 1/2 blood cultures positive from gram positive rods


* PCR testing negative


* Likely contaminant


* Repeat cultures today


* Monitor 





Dementia


* No acute concerns


* Untreated


* Let me sleep protocol





Chronic renal insufficiency, stage III (moderate), improved 


* DC IV fluids


* Avoid nephrotoxic medications if able


* Baseline appears to be in the mid to upper ones for creatinine


* Monitor labs





Cerebral arterial aneurysm


Ptosis


* No acute concerns





Shingles


* No acute concerns


* Lesions have crusted over





AAA (abdominal aortic aneurysm)


* No acute concerns





COPD (chronic obstructive pulmonary disease)


* No acute concerns


* As needed DuoNebs


* PRN albuterol MDI





Chronic back pain


* PT/OT


* Pain medications as ordered





Code status: Full code


PCP: [facility]


DVT prophylaxis: Heparin Subcutaneous


Social: Patient resides at [facility]


Disposition: Admitted to hospital floor inpatient with telemetry for management 

of UTI and sepsis.  Hopeful for discharge tomorrow pending labs.





LOS >96 hours based on positive blood culture results and need for continued IV 

abx.

## 2021-12-21 NOTE — [id number]
**Discharge Summary





- Hospital Course


HPI Initial Comments: 


This is an 86-year-old male who presents to ED from [place] assisted living 

facility with chills, generalized weakness, tremor, confusion, frequent 

urination, and tea colored urine for the past 3 to 4 days.  He does have a 

history of untreated brain aneurysm with left vision difficulties and left pupil

dilation.  He is elected not to pursue this.  He reportedly slid out of his 

wheelchair in our waiting room and they noted some swelling on his occipital s

calp but no bruising or tenderness.





In the ED twelve-lead EKG is obtained showing a sinus rhythm at 86 bpm.  PACs 

are present in a prolonged NM interval.  Q waves are noted in V2 through V3.  

Temp is 98 Fahrenheit.  Pulse 110.  Respirations 16.  Blood pressure 109/65.  

Pulse ox 99%.  Labs are obtained showing a WBC of 16.47.  Hemoglobin 13.5.  

Platelet 281,000.  Neutrophils are elevated 84%.  There is 10% band neutrophils 

noted.  INR is 1.06.  Sodium 139.  Potassium 4.1.  Chloride 103.  Carbon dioxide

21.  Anion gap is 19.1.  BUN is 22.  Creatinine 2.0.  GFR 32.  Glucose 127.  

Calcium is 9.1.  Bilirubin 0.6.  AST is 13, ALT 20, alkaline phosphatase 66.  

CRP is 2.6.  Protein is 7.5.  Albumin is 3.1.  Lactic acid is initially 4.1 and 

is 3.5 on repeat.  SARS-CoV-2 RNA is negative.  Chest x-ray is obtained showing 

emphysematous change but nothing acute.  Left wrist x-rays obtained showing 

osteopenia area and degenerative change with chondrocalcinosis but no acute 

abnormality.  Patient is noted to have difficulty voiding in the ED and 

initially refuses straight catheterization.  He ultimately does agree. UA shows 

turbid urine with 2+ protein, 2+ occult blood, Vikram, 3+ leukoesterase, 50-75 

RBCs, too numerous to count WBCs, many bacteria and moderate mucus.  Blood 

cultures were obtained and are pending.  





Of note patient was recently hospitalized for UTI from 11-2-2021 through 11-5- 2021 and culture grew out E. coli and Enterococcus species.  During that time 

patient was receiving Rocephin.  He was discharged on 5 more days of [id number] 

Augmentin.  During that visit he was also noted to have herpes zoster on his 

right upper back and was discharged on Valtrex for this.





He carries a history of brain aneurysm, hypertension, aortic arch aneurysm, 

COPD, chronic constipation, BPH, recurrent UTI, chronic back pain, dementia.  He

is a current occasional tobacco user.  He is a full code.  [facility]  He subsequently admitted to the medical floor on telemetry for 

management of his UTI and sepsis.





Diagnosis: Stroke: No





- Discharge Data


Discharge Date: 12/21/21 (Admit date: 12/16/2021)


Discharge Disposition: DC/Tfer to Other [facility]


Condition: Stable





- Referral to Home Health


Primary Care Physician: 


Melanie Cao MD








- Discharge Diagnosis/Problem(s)


(1) Dementia


SNOMED Code(s): 81387219


   ICD Code: F03.90 - UNSPECIFIED DEMENTIA WITHOUT BEHAVIORAL DISTURBANCE   

Status: Chronic   Priority: Low   Current Visit: No   


Qualifiers: 


   Dementia type: unspecified type   Dementia behavioral disturbance: without 

behavioral disturbance   Qualified Code(s): F03.90 - Unspecified dementia 

without behavioral disturbance   





(2) Generalized weakness


SNOMED Code(s): 52538206


   ICD Code: R53.1 - WEAKNESS   Status: Acute   Priority: High   Current Visit: 

No   





(3) Ptosis


SNOMED Code(s): 95357419


   ICD Code: H02.409 - UNSPECIFIED PTOSIS OF UNSPECIFIED EYELID   Status: 

Chronic   Priority: Low   Current Visit: No   


Qualifiers: 


   Laterality: left   Qualified Code(s): H02.402 - Unspecified ptosis of left 

eyelid   





(4) Shingles


SNOMED Code(s): 2986603


   ICD Code: B02.9 - ZOSTER WITHOUT COMPLICATIONS   Status: Chronic   Priority: 

Low   Current Visit: No   


Qualifiers: 


   Herpes zoster complications: without complications   Qualified Code(s): B02.9

- Zoster without complications   





(5) UTI, Urinary tract infectious disease


SNOMED Code(s): 99550904


   ICD Code: N39.0 - URINARY TRACT INFECTION, SITE NOT SPECIFIED   Status: Acute

  Priority: High   Current Visit: Yes   





(6) AAA (abdominal aortic aneurysm)


SNOMED Code(s): 430098810


   ICD Code: I71.4 - ABDOMINAL AORTIC ANEURYSM, WITHOUT RUPTURE   Status: 

Chronic   Priority: Medium   Current Visit: No   


Qualifiers: 


   Presence of rupture: without rupture   Qualified Code(s): I71.4 - Abdominal 

aortic aneurysm, without rupture   





(7) COPD (chronic obstructive pulmonary disease)


SNOMED Code(s): 50793696


   ICD Code: J44.9 - CHRONIC OBSTRUCTIVE PULMONARY DISEASE, UNSPECIFIED   

Status: Chronic   Priority: Medium   Current Visit: No   


Qualifiers: 


   COPD type: unspecified COPD   Qualified Code(s): J44.9 - Chronic obstructive 

pulmonary disease, unspecified   





(8) Cerebral arterial aneurysm


SNOMED Code(s): 841459289


   ICD Code: I67.1 - CEREBRAL ANEURYSM, NONRUPTURED   Status: Chronic   

Priority: Low   Current Visit: No   





(9) Chronic back pain


SNOMED Code(s): 154294356


   ICD Code: M54.9 - DORSALGIA, UNSPECIFIED; G89.29 - OTHER CHRONIC PAIN   

Status: Chronic   Priority: Low   Current Visit: No   


Qualifiers: 


   Back pain location: back pain in unspecified location   Back pain laterality:

unspecified   Qualified Code(s): M54.9 - Dorsalgia, unspecified; G89.29 - Other 

chronic pain   





(10) Chronic renal insufficiency, stage III (moderate)


SNOMED Code(s): 494601854


   ICD Code: N18.30 - CHRONIC KIDNEY DISEASE, STAGE 3 UNSPECIFIED   Status: 

Chronic   Priority: Medium   Current Visit: No   


Qualifiers: 


   Chronic kidney disease stage 3 subtype: stage 3a (GFR 45-59)   Qualified 

Code(s): N18.31 - Chronic kidney disease, stage 3a   





(11) Prostate disorder


SNOMED Code(s): 83241247


   ICD Code: N42.9 - DISORDER OF PROSTATE, UNSPECIFIED   Status: Chronic   

Priority: Medium   Current Visit: No   





(12) Recurrent UTI


SNOMED Code(s): 208562717


   ICD Code: N39.0 - URINARY TRACT INFECTION, SITE NOT SPECIFIED   Status: 

Chronic   Priority: High   Current Visit: No   





(13) Elevated lactic acid level


SNOMED Code(s): 2702736


   ICD Code: R79.89 - OTHER SPECIFIED ABNORMAL FINDINGS OF BLOOD CHEMISTRY   

Status: Resolved   Priority: High   Current Visit: No   





(14) Sepsis


SNOMED Code(s): 51008750


   ICD Code: A41.9 - SEPSIS, UNSPECIFIED ORGANISM   Status: Resolved   Priority:

High   Current Visit: No   


Qualifiers: 


   Sepsis type: sepsis due to unspecified organism   Sepsis acute organ 

dysfunction status: with acute organ dysfunction   Severe sepsis acute organ 

dysfunction type: unspecified   Severe sepsis shock status: without septic shock

  Qualified Code(s): A41.9 - Sepsis, unspecified organism; R65.20 - Severe 

sepsis without septic shock   





(15) Positive blood culture


SNOMED Code(s): 660742718


   ICD Code: R78.81 - BACTEREMIA   Status: Acute   Priority: High   Current 

Visit: Yes   





- Patient Summary/Data


Consults: 


                                  Consultations





12/16/21 14:24


Consult to Case Management/ [CONS] Routine 





12/16/21 14:26


OT Evaluation and Treatment [CONS] Routine 


PT Evaluation and Treatment [CONS] Routine 











Labs Pending at D/C: 


Repeat blood cultures





Recommended Follow-up Testing/Procedures: 


Follow-up with primary care provider within 5 to 7 days of discharge, sooner if 

needed.


* Commend repeat CBC, CMP, and magnesium


* Repeat blood cultures are pending.  Will contact patient if these are 

  positive.


* Patient discharged on 3 days of 500 mg Levaquin due to prior culture and 

  sensitivities from UTI.





Recommend urology follow-up after discharge.





Hospital Course: 


This is an 86-year-old male admitted to the floor for treatment of sepsis and 

suspected UTI.  Lactic acid was quite elevated and patient was started on 

aggressive IV fluids, which resolve this.  UA was strongly positive however 

interestingly urinary culture came back with mixed do suggesting 

contamination.  On admission patient was noted to have leukocytosis of 16.47.  

And a procalcitonin of 1.79.  He was noted to be somewhat dry and did have some 

acute kidney injury with a creatinine of 2.0 and a GFR of 32.  Patient does have

 chronic kidney failure with a baseline creatinine near 1.5 and a GFR in the mid

 to upper 40s.  Patient does have a history of multiple urinary tract infections

 and prior cultures and sensitivities were reviewed.  He was septic on 

admission.  Because of this patient was started on cefepime and vancomycin with 

pharmacy to dose vancomycin.  Blood cultures returned positive for Eggerthella 

Lenta, although it was only 1 out of 2 cultures.  Blood PCR was negative for 

bacteria.  It is felt this is likely contaminant however repeat blood cultures 

were obtained and are still pending.  Patient did work with PT and OT and 

although he was a bit stubborn and refused that time they do recommend return to

 assisted living facility.  Overall patient reports he feels much better.  

Discussed discharge antibiotic with Dr. Duran, attending hospitalist.  He will

 be discharged home today on 3 more days of 500 mg p.o. Levaquin.  He did 

receive a dose of Levaquin today prior to leaving.  Recommend he follow-up with 

his primary care provider within 5 to 7 days of discharge, sooner if needed.  

Recommend repeat CBC, CMP, and magnesium in follow-up.  Recommend outpatient 

urology follow-up.  Patient returned to [facility] today.








- Patient Instructions


Diet: Usual Diet as Tolerated


Activity: As Tolerated


Driving: Do Not Drive


Showering/Bathing: May Shower


Notify Provider of: Fever, Increased Pain, Nausea and/or Vomiting


Other/Special Instructions: Follow-up with primary care provider within 5 to 7 

days of discharge, sooner if needed.  Recommend outpatient urology follow-up.  

You were prescribed Levaquin, which is an antibiotic.  You should take your 

first dose on 12/22/2021 around noon and take this daily until gone.  Resume 

home medications as directed.  Stay hydrated.  Drink plenty of fluids.  Should 

symptoms return or worsen contact your primary care provider or return to the 

emergency room.





- Discharge Plan


*PRESCRIPTION DRUG MONITORING PROGRAM REVIEWED*: No


*COPY OF PRESCRIPTION DRUG MONITORING REPORT IN PATIENT MEG: No


Prescriptions/Med Rec: 


levoFLOXacin [Levaquin] 500 mg PO DAILY #3 tab


Tobacco Cessation Medication: Prescription Refused


Home Medications: 


                                    Home Meds





Cholecalciferol (Vitamin D3) [Vitamin D3] 2,000 unit PO DAILY 01/15/21 [History]


Docusate Sodium [Colace] 100 mg PO BID PRN #20 cap 01/19/21 [Rx]


Ferrous Sulfate [Iron] 325 mg PO DAILY 11/02/21 [History]


Sennosides/Docusate Sodium [Senna Plus 8.6-50 mg Tablet] 1 tab PO BID PRN 

11/02/21 [History]


Sennosides/Docusate Sodium [Senna Plus 8.6-50 mg Tablet] 2 tab PO QPM 11/02/21 

[History]


Tamsulosin [Flomax] 0.4 mg PO BEDTIME 11/02/21 [History]


Acetaminophen 650 mg PO QID PRN 12/16/21 [History]


L.acidoph,Paracasei, B.lactis [Probiotic] 1 cap PO DAILY 12/16/21 [History]


levoFLOXacin [Levaquin] 500 mg PO DAILY #3 tab 12/21/21 [Rx]








Oxygen Therapy Mode: Room Air


Patient Handouts:  Steps to Quit Smoking, Easy-to-Read, Sepsis, Diagnosis, Adult


Referrals: 


Skager,Melanie, MD [Primary Care Provider] -  ([facility] will [facility] to schedule 

follow up appt.)





- Discharge Summary/Plan Comment


DC Time >30 min.: Yes


Total # of Minutes for Discharge Time: 


45








- General Info


Date of Service: 12/21/21


Admission Dx/Problem (Free Text: 


                           Admission Diagnosis/Problem





Admission Diagnosis/Problem      Urosepsis








Functional Status: Reports: Pain Controlled, Tolerating Diet, Ambulating, 

Urinating.  Denies: New Symptoms





- Review of Systems


General: Reports: No Symptoms.  Denies: Fever, Weakness, Fatigue, Malaise, 

Chills


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  

Denies: Headaches, Sore Throat


Pulmonary: Reports: No Symptoms.  Denies: Shortness of Breath, Cough, Sputum, 

Wheezing


Cardiovascular: Reports: No Symptoms.  Denies: Chest Pain, Palpitations, Dyspnea

on Exertion, Edema


Gastrointestinal: Reports: No Symptoms.  Denies: Abdominal Pain, Constipation, 

Diarrhea, Nausea, Vomiting


Genitourinary: Reports: No Symptoms.  Denies: Pain


Musculoskeletal: Reports: No Symptoms.  Denies: Neck Pain


Skin: Reports: No Symptoms.  Denies: Cyanosis


Neurological: Reports: Confusion, Pre-Existing Deficit (Baseline dementia).  

Denies: Dizziness, Headache, Numbness, Seizure, Syncope, Tingling, Tremors, 

Trouble Speaking, Difficulty Walking, Weakness, Gait Disturbance


Psychiatric: Reports: No Symptoms





- Patient Data


Vitals - Most Recent: 


                                Last Vital Signs











Temp  97.3 F   12/21/21 07:56


 


Pulse  58 L  12/21/21 07:56


 


Resp  22 H  12/21/21 07:56


 


BP  123/70   12/21/21 07:56


 


Pulse Ox  94 L  12/21/21 07:56











Weight - Most Recent: 145 lb 1.6 oz


I&O - Last 24 hours: 


                                 Intake & Output











 12/20/21 12/21/21 12/21/21





 22:59 06:59 14:59


 


Intake Total 650 150 


 


Output Total 450 550 


 


Balance 200 -400 











Lab Results - Last 24 hrs: 


                         Laboratory Results - last 24 hr











  12/20/21 12/20/21 Range/Units





  09:12 09:12 


 


WBC  6.84   (4.23-9.07)  K/mm3


 


RBC  3.86 L   (4.63-6.08)  M/mm3


 


Hgb  12.1 L   (13.7-17.5)  gm/dl


 


Hct  37.7 L   (40.1-51.0)  %


 


MCV  97.7 H   (79.0-92.2)  fl


 


MCH  31.3   (25.7-32.2)  pg


 


MCHC  32.1 L   (32.2-35.5)  g/dl


 


RDW Std Deviation  54.5 H   (35.1-43.9)  fL


 


Plt Count  218   (163-337)  K/mm3


 


MPV  9.7   (9.4-12.3)  fl


 


Neut % (Auto)  47.3   (34.0-67.9)  %


 


Lymph % (Auto)  35.2   (21.8-53.1)  %


 


Mono % (Auto)  11.4   (5.3-12.2)  %


 


Eos % (Auto)  4.7   (0.8-7.0)  


 


Baso % (Auto)  1.3 H   (0.1-1.2)  %


 


Neut # (Auto)  3.23   (1.78-5.38)  K/mm3


 


Lymph # (Auto)  2.41   (1.32-3.57)  K/mm3


 


Mono # (Auto)  0.78   (0.30-0.82)  K/mm3


 


Eos # (Auto)  0.32   (0.04-0.54)  K/mm3


 


Baso # (Auto)  0.09 H   (0.01-0.08)  K/mm3


 


Sodium   140  (136-145)  mEq/L


 


Potassium   3.6  (3.5-5.1)  mEq/L


 


Chloride   107  ()  mEq/L


 


Carbon Dioxide   22  (21-32)  mEq/L


 


Anion Gap   14.6  (5-15)  


 


BUN   21 H  (7-18)  mg/dL


 


Creatinine   1.4 H  (0.7-1.3)  mg/dL


 


Est Cr Clr Drug Dosing   35.06  mL/min


 


Estimated GFR (MDRD)   48  (>60)  mL/min


 


BUN/Creatinine Ratio   15.0  (14-18)  


 


Glucose   101 H  (70-99)  mg/dL


 


Calcium   8.5  (8.5-10.1)  mg/dL


 


Magnesium   2.0  (1.8-2.4)  mg/dL


 


C-Reactive Protein   1.2 H*  (<1.0)  mg/dL











BILLIE Results - Last 24 hrs: 


                                  Microbiology











 12/16/21 08:55 Bacteria Detection (PCR) - Final





 Blood 











Med Orders - Current: 


                               Current Medications





Acetaminophen (Acetaminophen 325 Mg Tab)  650 mg PO Q4H PRN


   PRN Reason: Pain (Mild 1-3)/fever


   Last Admin: 12/20/21 09:49 Dose:  650 mg


   Documented by: 


Albuterol (Albuterol 6.7 Gm Inhaler)  0 gm INH Q2H PRN


   PRN Reason: SOB/Wheezing


Albuterol/Ipratropium (Albuterol/Ipratropium 3.0-0.5 Mg/3 Ml Neb Soln)  3 ml NEB

QIDRT PRN


   PRN Reason: Shortness Of Breath/wheezing


Cholecalciferol (Cholecalciferol (Vitamin D3) 25 Mcg Tab)  50 mcg PO DAILY [facility]


   Last Admin: 12/21/21 08:14 Dose:  50 mcg


   Documented by: 


Ferrous Sulfate (Ferrous Sulfate 324 Mg Tab.Ec)  324 mg PO DAILY [facility]


   Last Admin: 12/21/21 08:13 Dose:  324 mg


   Documented by: 


Heparin Sodium (Porcine) (Heparin Sodium 5,000 Units/Ml Vial)  5,000 units 

SUBCUT Q8H [facility]


   Last Admin: 12/21/21 08:14 Dose:  Not Given


   Documented by: 


Levofloxacin (Levofloxacin 500 Mg Tab)  500 mg PO Q24H [facility]


Ondansetron HCl (Ondansetron 4 Mg/2 Ml Sdv)  4 mg IV Q6H PRN


   PRN Reason: Nausea/Vomiting


Oxycodone HCl (Oxycodone 5 Mg Tab)  5 mg PO Q4H PRN


   PRN Reason: Pain (moderate 4-6)


Saccharomyces Boulardii (Saccharomyces Boulardii (Probiotic) 250 Mg Cap)  250 mg

PO DAILY [facility]


   Last Admin: 12/21/21 08:13 Dose:  250 mg


   Documented by: 


Senna/Docusate Sodium (Docusate Sodium/Sennosides 50-8.6 Mg Tab)  1 tab PO BID 

PRN


   PRN Reason: Constipation


   Last Admin: 12/21/21 08:15 Dose:  1 tab


   Documented by: 


Senna/Docusate Sodium (Docusate Sodium/Sennosides 50-8.6 Mg Tab)  2 tab PO QPM 

[facility]


   Last Admin: 12/20/21 17:57 Dose:  2 tab


   Documented by: 


Sodium Chloride (Sodium Chloride 0.9% 10 Ml Syringe)  10 ml FLUSH ASDIRECTED PRN


   PRN Reason: Keep Vein Open


   Last Admin: 12/16/21 09:16 Dose:  10 ml


   Documented by: 


Tamsulosin HCl (Tamsulosin 0.4 Mg Cap.Er)  0.4 mg PO BEDTIME [facility]


   Last Admin: 12/20/21 20:43 Dose:  0.4 mg


   Documented by: 





Discontinued Medications





Acetaminophen (Acetaminophen 325 Mg Tab)  650 mg PO Q4H PRN


   PRN Reason: Pain (Mild 1-3)/fever


Albuterol (Albuterol 6.7 Gm Inhaler)  0 gm INH Q2H PRN


   PRN Reason: SOB/Wheezing


Albuterol/Ipratropium (Albuterol/Ipratropium 3.0-0.5 Mg/3 Ml Neb Soln)  3 ml NEB

Q6HRRT PRN


   PRN Reason: wheezing/SOB/cough


Cholecalciferol (Cholecalciferol (Vitamin D3) 25 Mcg Tab)  50 mcg PO DAILY [facility]


Ferrous Sulfate (Ferrous Sulfate 324 Mg Tab.Ec)  324 mg PO DAILY [facility]


Heparin Sodium (Porcine) (Heparin Sodium 5,000 Units/Ml Vial)  5,000 units 

SUBCUT Q8H [facility]


Sodium Chloride (Normal Saline)  1,000 mls @ 126 mls/hr IV ASDIRECTED [facility]


   Last Admin: 12/16/21 09:16 Dose:  126 mls/hr


   Documented by: 


Sodium Chloride (Normal Saline)  1,000 mls @ 250 mls/hr IV ASDIRECTED [facility]


   Last Admin: 12/16/21 10:14 Dose:  250 mls/hr


   Documented by: 


Cefepime HCl 2 gm/ Sodium (Chloride)  50 mls @ 100 mls/hr IV ONETIME ONE


   Stop: 12/16/21 12:51


   Last Admin: 12/16/21 12:36 Dose:  100 mls/hr


   Documented by: 


Vancomycin HCl 1 gm/Vancomycin HCl 250 mg/ Sodium Chloride  250 mls @ 166 mls/hr

IV ONETIME ONE


   Stop: 12/16/21 14:45


   Last Admin: 12/16/21 13:26 Dose:  166 mls/hr


   Documented by: 


Cefepime HCl 2 gm/ Sodium (Chloride)  50 mls @ 100 mls/hr IV Q12H [facility]


   Last Admin: 12/21/21 00:27 Dose:  100 mls/hr


   Documented by: 


Vancomycin HCl 1 gm/Vancomycin HCl 250 mg/ Sodium Chloride  250 mls @ 166.667 

mls/hr IV Q24H [facility]


   Last Admin: 12/20/21 12:40 Dose:  166.667 mls/hr


   Documented by: 


Lactated Ringer's (Ringers, Lactated)  1,000 mls @ 250 mls/hr IV ASDIRECTED [facility]


   Last Infusion: 12/16/21 16:56 Dose:  50 mls/hr


   Documented by: 


Cefepime HCl 2 gm/ Sodium (Chloride)  50 mls @ 100 mls/hr IV Q12H [facility]


Vancomycin HCl 1 gm/Vancomycin HCl 250 mg/ Sodium Chloride  250 mls @ 166.667 

mls/hr IV Q24H [facility]


Lactated Ringer's (Ringers, Lactated)  1,000 mls @ 50 mls/hr IV ASDIRECTED [facility]


   Last Admin: 12/17/21 11:24 Dose:  50 mls/hr


   Documented by: 


Lidocaine HCl (Lidocaine 2% Jelly 10 Ml Urojet)  10 ml MUCMEM ONETIME ONE


   Stop: 12/16/21 11:45


   Last Admin: 12/16/21 12:42 Dose:  Not Given


   Documented by: 


Magnesium Hydroxide (Magnesium Hydroxide 400 Mg/5 Ml Susp 30 Ml Cup)  30 ml PO 

ONETIME ONE


   Stop: 12/17/21 06:01


   Last Admin: 12/17/21 05:16 Dose:  Not Given


   Documented by: 


Ondansetron HCl (Ondansetron 4 Mg/2 Ml Sdv)  4 mg IV Q4H PRN


   PRN Reason: Nausea/Vomiting


Saccharomyces Boulardii (Saccharomyces Boulardii (Probiotic) 250 Mg Cap)  250 mg

PO DAILY [facility]


Senna/Docusate Sodium (Docusate Sodium/Sennosides 50-8.6 Mg Tab)  1 tab PO BID 

PRN


   PRN Reason: Constipation


Senna/Docusate Sodium (Docusate Sodium/Sennosides 50-8.6 Mg Tab)  2 tab PO QPM 

[facility]


Tamsulosin HCl (Tamsulosin 0.4 Mg Cap.Er)  0.4 mg PO BEDTIME [facility]


Vancomycin HCl (Pharmacy To Dose - Vancomycin)  1 dose .XX ASDIRECTED ONE


   Stop: 12/16/21 12:46


   Last Admin: 12/16/21 13:22 Dose:  Not Given


   Documented by: 


Vancomycin HCl (Pharmacy To Dose - Vancomycin)  1 dose .XX ASDIRECTED PRN


   PRN Reason: RX TO DOSE VANCO


Vancomycin HCl (Pharmacy To Dose - Vancomycin)  1 dose .XX ASDIRECTED PRN


   PRN Reason: RX TO DOSE VANCO











- Exam


Quality Assessment: Reports: DVT Prophylaxis.  Denies: Supplemental Oxygen, 

Urine Catheter


General: Reports: Alert, Oriented, Cooperative, No Acute Distress


HEENT: Reports: Mucous Membr. Moist/Pink, Other (Left eye ptosis which is 

chronic).  Denies: Pupils Equal


Neck: Reports: Supple, Trachea Midline


Lungs: Reports: Clear to Auscultation, Normal Respiratory Effort


Cardiovascular: Reports: Regular Rate, Regular Rhythm


GI/Abdominal Exam: Normal Bowel Sounds, Soft, Non-Tender, No Distention


 (Male) Exam: Deferred


Rectal (Males) Exam: Deferred


Back Exam: Reports: Normal Inspection, Full Range of Motion, Other (No visible 

herpes zoster lesions although patient still reports right upper back pain.).  

Denies: CVA Tenderness (L), CVA Tenderness (R)


Extremities: Normal Inspection, Normal Range of Motion, Non-Tender, No Pedal 

Edema, Normal Capillary Refill


Skin: Reports: Warm, Dry, Intact


Neurological: Reports: No New Focal Deficit


Psy/Mental Status: Reports: Alert, Normal Affect, Normal Mood